# Patient Record
Sex: FEMALE | Race: WHITE | Employment: OTHER | URBAN - METROPOLITAN AREA
[De-identification: names, ages, dates, MRNs, and addresses within clinical notes are randomized per-mention and may not be internally consistent; named-entity substitution may affect disease eponyms.]

---

## 2017-01-01 ENCOUNTER — APPOINTMENT (OUTPATIENT)
Dept: ULTRASOUND IMAGING | Age: 82
DRG: 871 | End: 2017-01-01
Attending: INTERNAL MEDICINE
Payer: MEDICARE

## 2017-01-01 ENCOUNTER — APPOINTMENT (OUTPATIENT)
Dept: GENERAL RADIOLOGY | Age: 82
DRG: 871 | End: 2017-01-01
Attending: EMERGENCY MEDICINE
Payer: MEDICARE

## 2017-01-01 ENCOUNTER — APPOINTMENT (OUTPATIENT)
Dept: CT IMAGING | Age: 82
DRG: 871 | End: 2017-01-01
Attending: INTERNAL MEDICINE
Payer: MEDICARE

## 2017-01-01 ENCOUNTER — HOSPITAL ENCOUNTER (INPATIENT)
Age: 82
LOS: 4 days | Discharge: HOME HEALTH | DRG: 871 | End: 2017-12-27
Attending: EMERGENCY MEDICINE | Admitting: INTERNAL MEDICINE
Payer: MEDICARE

## 2017-01-01 ENCOUNTER — APPOINTMENT (OUTPATIENT)
Dept: GENERAL RADIOLOGY | Age: 82
DRG: 871 | End: 2017-01-01
Attending: INTERNAL MEDICINE
Payer: MEDICARE

## 2017-01-01 ENCOUNTER — PATIENT OUTREACH (OUTPATIENT)
Dept: CASE MANAGEMENT | Age: 82
End: 2017-01-01

## 2017-01-01 ENCOUNTER — HOME CARE VISIT (OUTPATIENT)
Dept: SCHEDULING | Facility: HOME HEALTH | Age: 82
End: 2017-01-01
Payer: MEDICARE

## 2017-01-01 ENCOUNTER — TELEPHONE (OUTPATIENT)
Dept: HOME HEALTH SERVICES | Facility: HOME HEALTH | Age: 82
End: 2017-01-01

## 2017-01-01 ENCOUNTER — HOME HEALTH ADMISSION (OUTPATIENT)
Dept: HOME HEALTH SERVICES | Facility: HOME HEALTH | Age: 82
End: 2017-01-01
Payer: MEDICARE

## 2017-01-01 VITALS
OXYGEN SATURATION: 90 % | BODY MASS INDEX: 30.66 KG/M2 | SYSTOLIC BLOOD PRESSURE: 107 MMHG | TEMPERATURE: 98.2 F | HEIGHT: 65 IN | HEART RATE: 104 BPM | RESPIRATION RATE: 18 BRPM | WEIGHT: 184 LBS | DIASTOLIC BLOOD PRESSURE: 56 MMHG

## 2017-01-01 VITALS
OXYGEN SATURATION: 92 % | DIASTOLIC BLOOD PRESSURE: 60 MMHG | HEART RATE: 80 BPM | SYSTOLIC BLOOD PRESSURE: 120 MMHG | TEMPERATURE: 96.8 F

## 2017-01-01 DIAGNOSIS — R53.1 WEAKNESS: ICD-10-CM

## 2017-01-01 DIAGNOSIS — R41.0 CONFUSION: Primary | ICD-10-CM

## 2017-01-01 DIAGNOSIS — J20.9 ACUTE BRONCHITIS, UNSPECIFIED ORGANISM: ICD-10-CM

## 2017-01-01 DIAGNOSIS — I50.9 ACUTE ON CHRONIC CONGESTIVE HEART FAILURE, UNSPECIFIED CONGESTIVE HEART FAILURE TYPE: ICD-10-CM

## 2017-01-01 LAB
ALBUMIN SERPL-MCNC: 2.5 G/DL (ref 3.2–4.6)
ALBUMIN SERPL-MCNC: 3.1 G/DL (ref 3.2–4.6)
ALBUMIN/GLOB SERPL: 0.7 {RATIO} (ref 1.2–3.5)
ALBUMIN/GLOB SERPL: 0.7 {RATIO} (ref 1.2–3.5)
ALP SERPL-CCNC: 58 U/L (ref 50–136)
ALP SERPL-CCNC: 67 U/L (ref 50–136)
ALT SERPL-CCNC: 112 U/L (ref 12–65)
ALT SERPL-CCNC: 78 U/L (ref 12–65)
ANION GAP SERPL CALC-SCNC: 10 MMOL/L (ref 7–16)
ANION GAP SERPL CALC-SCNC: 12 MMOL/L (ref 7–16)
ANION GAP SERPL CALC-SCNC: 3 MMOL/L (ref 7–16)
ANION GAP SERPL CALC-SCNC: 7 MMOL/L (ref 7–16)
ANION GAP SERPL CALC-SCNC: 9 MMOL/L (ref 7–16)
AST SERPL-CCNC: 27 U/L (ref 15–37)
AST SERPL-CCNC: 43 U/L (ref 15–37)
ATRIAL RATE: 156 BPM
BACTERIA SPEC CULT: NORMAL
BACTERIA SPEC CULT: NORMAL
BACTERIA URNS QL MICRO: NORMAL /HPF
BASOPHILS # BLD: 0 K/UL (ref 0–0.2)
BASOPHILS NFR BLD: 0 % (ref 0–2)
BILIRUB DIRECT SERPL-MCNC: 0.1 MG/DL
BILIRUB SERPL-MCNC: 0.3 MG/DL (ref 0.2–1.1)
BILIRUB SERPL-MCNC: 0.4 MG/DL (ref 0.2–1.1)
BNP SERPL-MCNC: 834 PG/ML
BUN SERPL-MCNC: 48 MG/DL (ref 8–23)
BUN SERPL-MCNC: 50 MG/DL (ref 8–23)
BUN SERPL-MCNC: 51 MG/DL (ref 8–23)
BUN SERPL-MCNC: 51 MG/DL (ref 8–23)
BUN SERPL-MCNC: 61 MG/DL (ref 8–23)
CALCIUM SERPL-MCNC: 8.5 MG/DL (ref 8.3–10.4)
CALCIUM SERPL-MCNC: 8.5 MG/DL (ref 8.3–10.4)
CALCIUM SERPL-MCNC: 8.7 MG/DL (ref 8.3–10.4)
CALCIUM SERPL-MCNC: 8.7 MG/DL (ref 8.3–10.4)
CALCIUM SERPL-MCNC: 9.3 MG/DL (ref 8.3–10.4)
CALCULATED R AXIS, ECG10: 76 DEGREES
CALCULATED T AXIS, ECG11: 126 DEGREES
CASTS URNS QL MICRO: 0 /LPF
CHLORIDE SERPL-SCNC: 102 MMOL/L (ref 98–107)
CHLORIDE SERPL-SCNC: 103 MMOL/L (ref 98–107)
CHLORIDE SERPL-SCNC: 103 MMOL/L (ref 98–107)
CHLORIDE SERPL-SCNC: 105 MMOL/L (ref 98–107)
CHLORIDE SERPL-SCNC: 99 MMOL/L (ref 98–107)
CO2 SERPL-SCNC: 26 MMOL/L (ref 21–32)
CO2 SERPL-SCNC: 27 MMOL/L (ref 21–32)
CO2 SERPL-SCNC: 30 MMOL/L (ref 21–32)
CO2 SERPL-SCNC: 33 MMOL/L (ref 21–32)
CO2 SERPL-SCNC: 34 MMOL/L (ref 21–32)
CREAT SERPL-MCNC: 1.76 MG/DL (ref 0.6–1)
CREAT SERPL-MCNC: 1.77 MG/DL (ref 0.6–1)
CREAT SERPL-MCNC: 1.79 MG/DL (ref 0.6–1)
CREAT SERPL-MCNC: 1.85 MG/DL (ref 0.6–1)
CREAT SERPL-MCNC: 1.88 MG/DL (ref 0.6–1)
CRYSTALS URNS QL MICRO: 0 /LPF
DIAGNOSIS, 93000: NORMAL
DIFFERENTIAL METHOD BLD: ABNORMAL
EOSINOPHIL # BLD: 0 K/UL (ref 0–0.8)
EOSINOPHIL # BLD: 0 K/UL (ref 0–0.8)
EOSINOPHIL # BLD: 0.3 K/UL (ref 0–0.8)
EOSINOPHIL # BLD: 0.4 K/UL (ref 0–0.8)
EOSINOPHIL NFR BLD: 0 % (ref 0.5–7.8)
EOSINOPHIL NFR BLD: 0 % (ref 0.5–7.8)
EOSINOPHIL NFR BLD: 2 % (ref 0.5–7.8)
EOSINOPHIL NFR BLD: 3 % (ref 0.5–7.8)
EPI CELLS #/AREA URNS HPF: NORMAL /HPF
ERYTHROCYTE [DISTWIDTH] IN BLOOD BY AUTOMATED COUNT: 19.3 % (ref 11.9–14.6)
ERYTHROCYTE [DISTWIDTH] IN BLOOD BY AUTOMATED COUNT: 19.4 % (ref 11.9–14.6)
ERYTHROCYTE [DISTWIDTH] IN BLOOD BY AUTOMATED COUNT: 19.4 % (ref 11.9–14.6)
ERYTHROCYTE [DISTWIDTH] IN BLOOD BY AUTOMATED COUNT: 19.5 % (ref 11.9–14.6)
FLUAV AG NPH QL IA: NEGATIVE
FLUBV AG NPH QL IA: NEGATIVE
GLOBULIN SER CALC-MCNC: 3.7 G/DL (ref 2.3–3.5)
GLOBULIN SER CALC-MCNC: 4.3 G/DL (ref 2.3–3.5)
GLUCOSE SERPL-MCNC: 144 MG/DL (ref 65–100)
GLUCOSE SERPL-MCNC: 149 MG/DL (ref 65–100)
GLUCOSE SERPL-MCNC: 157 MG/DL (ref 65–100)
GLUCOSE SERPL-MCNC: 177 MG/DL (ref 65–100)
GLUCOSE SERPL-MCNC: 205 MG/DL (ref 65–100)
HCT VFR BLD AUTO: 29.6 % (ref 35.8–46.3)
HCT VFR BLD AUTO: 30.8 % (ref 35.8–46.3)
HCT VFR BLD AUTO: 31 % (ref 35.8–46.3)
HCT VFR BLD AUTO: 34.8 % (ref 35.8–46.3)
HGB BLD-MCNC: 10.7 G/DL (ref 11.7–15.4)
HGB BLD-MCNC: 9.4 G/DL (ref 11.7–15.4)
HGB BLD-MCNC: 9.7 G/DL (ref 11.7–15.4)
HGB BLD-MCNC: 9.7 G/DL (ref 11.7–15.4)
IMM GRANULOCYTES # BLD: 0.1 K/UL (ref 0–0.5)
IMM GRANULOCYTES # BLD: 0.1 K/UL (ref 0–0.5)
IMM GRANULOCYTES # BLD: 0.2 K/UL (ref 0–0.5)
IMM GRANULOCYTES NFR BLD AUTO: 1 % (ref 0–5)
INR PPP: 1
LACTATE BLD-SCNC: 2.6 MMOL/L (ref 0.5–1.9)
LACTATE BLD-SCNC: 2.9 MMOL/L (ref 0.5–1.9)
LACTATE SERPL-SCNC: 2 MMOL/L (ref 0.4–2)
LACTATE SERPL-SCNC: 2.9 MMOL/L (ref 0.4–2)
LYMPHOCYTES # BLD: 0.6 K/UL (ref 0.5–4.6)
LYMPHOCYTES # BLD: 1.2 K/UL (ref 0.5–4.6)
LYMPHOCYTES # BLD: 1.8 K/UL (ref 0.5–4.6)
LYMPHOCYTES # BLD: 1.9 K/UL (ref 0.5–4.6)
LYMPHOCYTES NFR BLD: 11 % (ref 13–44)
LYMPHOCYTES NFR BLD: 12 % (ref 13–44)
LYMPHOCYTES NFR BLD: 3 % (ref 13–44)
LYMPHOCYTES NFR BLD: 7 % (ref 13–44)
MAGNESIUM SERPL-MCNC: 1.5 MG/DL (ref 1.8–2.4)
MAGNESIUM SERPL-MCNC: 1.6 MG/DL (ref 1.8–2.4)
MAGNESIUM SERPL-MCNC: 1.8 MG/DL (ref 1.8–2.4)
MAGNESIUM SERPL-MCNC: 2.2 MG/DL (ref 1.8–2.4)
MCH RBC QN AUTO: 26.2 PG (ref 26.1–32.9)
MCH RBC QN AUTO: 26.3 PG (ref 26.1–32.9)
MCH RBC QN AUTO: 26.5 PG (ref 26.1–32.9)
MCH RBC QN AUTO: 26.7 PG (ref 26.1–32.9)
MCHC RBC AUTO-ENTMCNC: 30.7 G/DL (ref 31.4–35)
MCHC RBC AUTO-ENTMCNC: 31.3 G/DL (ref 31.4–35)
MCHC RBC AUTO-ENTMCNC: 31.5 G/DL (ref 31.4–35)
MCHC RBC AUTO-ENTMCNC: 31.8 G/DL (ref 31.4–35)
MCV RBC AUTO: 84 FL (ref 79.6–97.8)
MCV RBC AUTO: 84.1 FL (ref 79.6–97.8)
MCV RBC AUTO: 84.2 FL (ref 79.6–97.8)
MCV RBC AUTO: 85.1 FL (ref 79.6–97.8)
MM INDURATION POC: 0 MM (ref 0–5)
MM INDURATION POC: 0 MM (ref 0–5)
MONOCYTES # BLD: 1 K/UL (ref 0.1–1.3)
MONOCYTES # BLD: 1.1 K/UL (ref 0.1–1.3)
MONOCYTES # BLD: 1.2 K/UL (ref 0.1–1.3)
MONOCYTES # BLD: 1.2 K/UL (ref 0.1–1.3)
MONOCYTES NFR BLD: 5 % (ref 4–12)
MONOCYTES NFR BLD: 7 % (ref 4–12)
MONOCYTES NFR BLD: 7 % (ref 4–12)
MONOCYTES NFR BLD: 8 % (ref 4–12)
MUCOUS THREADS URNS QL MICRO: 0 /LPF
NEUTS SEG # BLD: 12 K/UL (ref 1.7–8.2)
NEUTS SEG # BLD: 12.4 K/UL (ref 1.7–8.2)
NEUTS SEG # BLD: 14.4 K/UL (ref 1.7–8.2)
NEUTS SEG # BLD: 17.4 K/UL (ref 1.7–8.2)
NEUTS SEG NFR BLD: 77 % (ref 43–78)
NEUTS SEG NFR BLD: 78 % (ref 43–78)
NEUTS SEG NFR BLD: 86 % (ref 43–78)
NEUTS SEG NFR BLD: 91 % (ref 43–78)
PLATELET # BLD AUTO: 265 K/UL (ref 150–450)
PLATELET # BLD AUTO: 291 K/UL (ref 150–450)
PLATELET # BLD AUTO: 301 K/UL (ref 150–450)
PLATELET # BLD AUTO: 319 K/UL (ref 150–450)
PLATELET COMMENTS,PCOM: ADEQUATE
PMV BLD AUTO: 11.3 FL (ref 10.8–14.1)
PMV BLD AUTO: 11.4 FL (ref 10.8–14.1)
PMV BLD AUTO: 11.7 FL (ref 10.8–14.1)
PMV BLD AUTO: 11.7 FL (ref 10.8–14.1)
POTASSIUM SERPL-SCNC: 3.8 MMOL/L (ref 3.5–5.1)
POTASSIUM SERPL-SCNC: 3.8 MMOL/L (ref 3.5–5.1)
POTASSIUM SERPL-SCNC: 3.9 MMOL/L (ref 3.5–5.1)
POTASSIUM SERPL-SCNC: 4.5 MMOL/L (ref 3.5–5.1)
POTASSIUM SERPL-SCNC: 4.5 MMOL/L (ref 3.5–5.1)
PPD POC: NORMAL NEGATIVE
PPD POC: NORMAL NEGATIVE
PROCALCITONIN SERPL-MCNC: 0.1 NG/ML
PROT SERPL-MCNC: 6.2 G/DL (ref 6.3–8.2)
PROT SERPL-MCNC: 7.4 G/DL (ref 6.3–8.2)
PROTHROMBIN TIME: 13.6 SEC (ref 11.5–14.5)
Q-T INTERVAL, ECG07: 320 MS
QRS DURATION, ECG06: 74 MS
QTC CALCULATION (BEZET), ECG08: 442 MS
RBC # BLD AUTO: 3.52 M/UL (ref 4.05–5.25)
RBC # BLD AUTO: 3.66 M/UL (ref 4.05–5.25)
RBC # BLD AUTO: 3.69 M/UL (ref 4.05–5.25)
RBC # BLD AUTO: 4.09 M/UL (ref 4.05–5.25)
RBC #/AREA URNS HPF: NORMAL /HPF
RBC MORPH BLD: ABNORMAL
SERVICE CMNT-IMP: NORMAL
SERVICE CMNT-IMP: NORMAL
SODIUM SERPL-SCNC: 139 MMOL/L (ref 136–145)
SODIUM SERPL-SCNC: 140 MMOL/L (ref 136–145)
SODIUM SERPL-SCNC: 140 MMOL/L (ref 136–145)
SODIUM SERPL-SCNC: 141 MMOL/L (ref 136–145)
SODIUM SERPL-SCNC: 143 MMOL/L (ref 136–145)
TROPONIN I SERPL-MCNC: 0.09 NG/ML (ref 0.02–0.05)
TROPONIN I SERPL-MCNC: 0.09 NG/ML (ref 0.02–0.05)
TROPONIN I SERPL-MCNC: 0.1 NG/ML (ref 0.02–0.05)
VENTRICULAR RATE, ECG03: 115 BPM
WBC # BLD AUTO: 15.4 K/UL (ref 4.3–11.1)
WBC # BLD AUTO: 15.8 K/UL (ref 4.3–11.1)
WBC # BLD AUTO: 16.8 K/UL (ref 4.3–11.1)
WBC # BLD AUTO: 19.3 K/UL (ref 4.3–11.1)
WBC MORPH BLD: ABNORMAL
WBC URNS QL MICRO: NORMAL /HPF

## 2017-01-01 PROCEDURE — G0151 HHCP-SERV OF PT,EA 15 MIN: HCPCS

## 2017-01-01 PROCEDURE — 94760 N-INVAS EAR/PLS OXIMETRY 1: CPT

## 2017-01-01 PROCEDURE — 97530 THERAPEUTIC ACTIVITIES: CPT

## 2017-01-01 PROCEDURE — C8929 TTE W OR WO FOL WCON,DOPPLER: HCPCS

## 2017-01-01 PROCEDURE — 76700 US EXAM ABDOM COMPLETE: CPT

## 2017-01-01 PROCEDURE — 74011250636 HC RX REV CODE- 250/636: Performed by: INTERNAL MEDICINE

## 2017-01-01 PROCEDURE — 94664 DEMO&/EVAL PT USE INHALER: CPT

## 2017-01-01 PROCEDURE — 74011250637 HC RX REV CODE- 250/637: Performed by: INTERNAL MEDICINE

## 2017-01-01 PROCEDURE — 86580 TB INTRADERMAL TEST: CPT | Performed by: INTERNAL MEDICINE

## 2017-01-01 PROCEDURE — 400013 HH SOC

## 2017-01-01 PROCEDURE — 97165 OT EVAL LOW COMPLEX 30 MIN: CPT

## 2017-01-01 PROCEDURE — 85025 COMPLETE CBC W/AUTO DIFF WBC: CPT | Performed by: EMERGENCY MEDICINE

## 2017-01-01 PROCEDURE — 83735 ASSAY OF MAGNESIUM: CPT | Performed by: INTERNAL MEDICINE

## 2017-01-01 PROCEDURE — 77030011256 HC DRSG MEPILEX <16IN NO BORD MOLN -A

## 2017-01-01 PROCEDURE — 97535 SELF CARE MNGMENT TRAINING: CPT

## 2017-01-01 PROCEDURE — 84145 PROCALCITONIN (PCT): CPT | Performed by: INTERNAL MEDICINE

## 2017-01-01 PROCEDURE — 74011000302 HC RX REV CODE- 302: Performed by: INTERNAL MEDICINE

## 2017-01-01 PROCEDURE — 81015 MICROSCOPIC EXAM OF URINE: CPT | Performed by: EMERGENCY MEDICINE

## 2017-01-01 PROCEDURE — 77030019605

## 2017-01-01 PROCEDURE — 93005 ELECTROCARDIOGRAM TRACING: CPT | Performed by: EMERGENCY MEDICINE

## 2017-01-01 PROCEDURE — 83605 ASSAY OF LACTIC ACID: CPT

## 2017-01-01 PROCEDURE — 85025 COMPLETE CBC W/AUTO DIFF WBC: CPT | Performed by: INTERNAL MEDICINE

## 2017-01-01 PROCEDURE — 36415 COLL VENOUS BLD VENIPUNCTURE: CPT | Performed by: INTERNAL MEDICINE

## 2017-01-01 PROCEDURE — 83605 ASSAY OF LACTIC ACID: CPT | Performed by: INTERNAL MEDICINE

## 2017-01-01 PROCEDURE — 74011000250 HC RX REV CODE- 250: Performed by: INTERNAL MEDICINE

## 2017-01-01 PROCEDURE — 94640 AIRWAY INHALATION TREATMENT: CPT

## 2017-01-01 PROCEDURE — 77030027688 HC DRSG MEPILEX 16-48IN NO BORD MOLN -A

## 2017-01-01 PROCEDURE — 77010033678 HC OXYGEN DAILY

## 2017-01-01 PROCEDURE — 80048 BASIC METABOLIC PNL TOTAL CA: CPT | Performed by: INTERNAL MEDICINE

## 2017-01-01 PROCEDURE — 71010 XR CHEST PORT: CPT

## 2017-01-01 PROCEDURE — 80053 COMPREHEN METABOLIC PANEL: CPT | Performed by: EMERGENCY MEDICINE

## 2017-01-01 PROCEDURE — 74011250636 HC RX REV CODE- 250/636: Performed by: EMERGENCY MEDICINE

## 2017-01-01 PROCEDURE — 81003 URINALYSIS AUTO W/O SCOPE: CPT | Performed by: EMERGENCY MEDICINE

## 2017-01-01 PROCEDURE — 87804 INFLUENZA ASSAY W/OPTIC: CPT | Performed by: INTERNAL MEDICINE

## 2017-01-01 PROCEDURE — 65660000000 HC RM CCU STEPDOWN

## 2017-01-01 PROCEDURE — 85610 PROTHROMBIN TIME: CPT | Performed by: EMERGENCY MEDICINE

## 2017-01-01 PROCEDURE — 77030027138 HC INCENT SPIROMETER -A

## 2017-01-01 PROCEDURE — 71010 XR CHEST SNGL V: CPT

## 2017-01-01 PROCEDURE — 83880 ASSAY OF NATRIURETIC PEPTIDE: CPT | Performed by: EMERGENCY MEDICINE

## 2017-01-01 PROCEDURE — 99285 EMERGENCY DEPT VISIT HI MDM: CPT | Performed by: EMERGENCY MEDICINE

## 2017-01-01 PROCEDURE — 80076 HEPATIC FUNCTION PANEL: CPT | Performed by: INTERNAL MEDICINE

## 2017-01-01 PROCEDURE — 65270000029 HC RM PRIVATE

## 2017-01-01 PROCEDURE — 84484 ASSAY OF TROPONIN QUANT: CPT | Performed by: INTERNAL MEDICINE

## 2017-01-01 PROCEDURE — 84484 ASSAY OF TROPONIN QUANT: CPT | Performed by: EMERGENCY MEDICINE

## 2017-01-01 PROCEDURE — 97161 PT EVAL LOW COMPLEX 20 MIN: CPT

## 2017-01-01 PROCEDURE — 87040 BLOOD CULTURE FOR BACTERIA: CPT | Performed by: EMERGENCY MEDICINE

## 2017-01-01 PROCEDURE — 96360 HYDRATION IV INFUSION INIT: CPT | Performed by: EMERGENCY MEDICINE

## 2017-01-01 PROCEDURE — 71250 CT THORAX DX C-: CPT

## 2017-01-01 RX ORDER — DILTIAZEM HYDROCHLORIDE 120 MG/1
120 CAPSULE, COATED, EXTENDED RELEASE ORAL DAILY
Status: DISCONTINUED | OUTPATIENT
Start: 2017-01-01 | End: 2017-01-01 | Stop reason: HOSPADM

## 2017-01-01 RX ORDER — FUROSEMIDE 40 MG/1
40 TABLET ORAL DAILY
Qty: 30 TAB | Refills: 0 | Status: SHIPPED | OUTPATIENT
Start: 2017-01-01 | End: 2018-01-01 | Stop reason: SDUPTHER

## 2017-01-01 RX ORDER — FUROSEMIDE 10 MG/ML
40 INJECTION INTRAMUSCULAR; INTRAVENOUS
Status: COMPLETED | OUTPATIENT
Start: 2017-01-01 | End: 2017-01-01

## 2017-01-01 RX ORDER — LANOLIN ALCOHOL/MO/W.PET/CERES
400 CREAM (GRAM) TOPICAL 2 TIMES DAILY
Qty: 60 TAB | Refills: 0 | Status: SHIPPED | OUTPATIENT
Start: 2017-01-01 | End: 2018-01-01 | Stop reason: SDUPTHER

## 2017-01-01 RX ORDER — DILTIAZEM HYDROCHLORIDE 120 MG/1
120 CAPSULE, COATED, EXTENDED RELEASE ORAL DAILY
Qty: 30 CAP | Refills: 0 | Status: SHIPPED | OUTPATIENT
Start: 2017-01-01 | End: 2018-01-01 | Stop reason: SDUPTHER

## 2017-01-01 RX ORDER — METOPROLOL TARTRATE 25 MG/1
25 TABLET, FILM COATED ORAL EVERY 12 HOURS
Status: DISCONTINUED | OUTPATIENT
Start: 2017-01-01 | End: 2017-01-01

## 2017-01-01 RX ORDER — METOPROLOL TARTRATE 5 MG/5ML
2.5 INJECTION INTRAVENOUS
Status: DISCONTINUED | OUTPATIENT
Start: 2017-01-01 | End: 2017-01-01

## 2017-01-01 RX ORDER — METOPROLOL TARTRATE 50 MG/1
50 TABLET ORAL EVERY 12 HOURS
Status: DISCONTINUED | OUTPATIENT
Start: 2017-01-01 | End: 2017-01-01

## 2017-01-01 RX ORDER — HEPARIN SODIUM 5000 [USP'U]/ML
5000 INJECTION, SOLUTION INTRAVENOUS; SUBCUTANEOUS EVERY 12 HOURS
Status: DISCONTINUED | OUTPATIENT
Start: 2017-01-01 | End: 2017-01-01 | Stop reason: HOSPADM

## 2017-01-01 RX ORDER — LEVOFLOXACIN 5 MG/ML
750 INJECTION, SOLUTION INTRAVENOUS
Status: COMPLETED | OUTPATIENT
Start: 2017-01-01 | End: 2017-01-01

## 2017-01-01 RX ORDER — AZITHROMYCIN 500 MG/1
500 TABLET, FILM COATED ORAL DAILY
Qty: 3 TAB | Refills: 0 | Status: SHIPPED | OUTPATIENT
Start: 2017-01-01 | End: 2018-01-01

## 2017-01-01 RX ORDER — MAGNESIUM SULFATE HEPTAHYDRATE 40 MG/ML
2 INJECTION, SOLUTION INTRAVENOUS ONCE
Status: COMPLETED | OUTPATIENT
Start: 2017-01-01 | End: 2017-01-01

## 2017-01-01 RX ORDER — ASPIRIN 81 MG/1
81 TABLET ORAL DAILY
Status: DISCONTINUED | OUTPATIENT
Start: 2017-01-01 | End: 2017-01-01 | Stop reason: HOSPADM

## 2017-01-01 RX ORDER — IPRATROPIUM BROMIDE AND ALBUTEROL SULFATE 2.5; .5 MG/3ML; MG/3ML
3 SOLUTION RESPIRATORY (INHALATION)
Status: DISCONTINUED | OUTPATIENT
Start: 2017-01-01 | End: 2017-01-01

## 2017-01-01 RX ORDER — DILTIAZEM HYDROCHLORIDE 5 MG/ML
20 INJECTION INTRAVENOUS ONCE
Status: COMPLETED | OUTPATIENT
Start: 2017-01-01 | End: 2017-01-01

## 2017-01-01 RX ORDER — HYDRALAZINE HYDROCHLORIDE 20 MG/ML
20 INJECTION INTRAMUSCULAR; INTRAVENOUS
Status: DISCONTINUED | OUTPATIENT
Start: 2017-01-01 | End: 2017-01-01 | Stop reason: HOSPADM

## 2017-01-01 RX ORDER — METOPROLOL TARTRATE 50 MG/1
50 TABLET ORAL EVERY 12 HOURS
Qty: 60 TAB | Refills: 0 | Status: SHIPPED | OUTPATIENT
Start: 2017-01-01 | End: 2018-01-01 | Stop reason: SDUPTHER

## 2017-01-01 RX ORDER — METOPROLOL TARTRATE 50 MG/1
100 TABLET ORAL EVERY 12 HOURS
Status: DISCONTINUED | OUTPATIENT
Start: 2017-01-01 | End: 2017-01-01

## 2017-01-01 RX ORDER — METOPROLOL TARTRATE 50 MG/1
50 TABLET ORAL EVERY 12 HOURS
Status: DISCONTINUED | OUTPATIENT
Start: 2017-01-01 | End: 2017-01-01 | Stop reason: HOSPADM

## 2017-01-01 RX ORDER — LEVOTHYROXINE SODIUM 75 UG/1
75 TABLET ORAL
Status: DISCONTINUED | OUTPATIENT
Start: 2017-01-01 | End: 2017-01-01 | Stop reason: HOSPADM

## 2017-01-01 RX ORDER — ACETAMINOPHEN 325 MG/1
650 TABLET ORAL
Qty: 20 TAB | Refills: 0 | Status: SHIPPED | OUTPATIENT
Start: 2017-01-01 | End: 2018-01-01 | Stop reason: SDUPTHER

## 2017-01-01 RX ORDER — ACETAMINOPHEN 325 MG/1
650 TABLET ORAL
Status: DISCONTINUED | OUTPATIENT
Start: 2017-01-01 | End: 2017-01-01 | Stop reason: HOSPADM

## 2017-01-01 RX ORDER — ASPIRIN 81 MG/1
81 TABLET ORAL DAILY
Qty: 30 TAB | Refills: 0 | Status: SHIPPED | OUTPATIENT
Start: 2017-01-01 | End: 2018-01-01 | Stop reason: SDUPTHER

## 2017-01-01 RX ORDER — IPRATROPIUM BROMIDE AND ALBUTEROL SULFATE 2.5; .5 MG/3ML; MG/3ML
3 SOLUTION RESPIRATORY (INHALATION)
Status: DISCONTINUED | OUTPATIENT
Start: 2017-01-01 | End: 2017-01-01 | Stop reason: HOSPADM

## 2017-01-01 RX ORDER — FUROSEMIDE 10 MG/ML
40 INJECTION INTRAMUSCULAR; INTRAVENOUS EVERY 12 HOURS
Status: DISCONTINUED | OUTPATIENT
Start: 2017-01-01 | End: 2017-01-01

## 2017-01-01 RX ORDER — FUROSEMIDE 40 MG/1
40 TABLET ORAL DAILY
Status: DISCONTINUED | OUTPATIENT
Start: 2017-01-01 | End: 2017-01-01 | Stop reason: HOSPADM

## 2017-01-01 RX ORDER — LANOLIN ALCOHOL/MO/W.PET/CERES
400 CREAM (GRAM) TOPICAL 2 TIMES DAILY
Status: DISCONTINUED | OUTPATIENT
Start: 2017-01-01 | End: 2017-01-01 | Stop reason: HOSPADM

## 2017-01-01 RX ORDER — ONDANSETRON 2 MG/ML
4 INJECTION INTRAMUSCULAR; INTRAVENOUS
Status: DISCONTINUED | OUTPATIENT
Start: 2017-01-01 | End: 2017-01-01 | Stop reason: HOSPADM

## 2017-01-01 RX ADMIN — IPRATROPIUM BROMIDE AND ALBUTEROL SULFATE 3 ML: 2.5; .5 SOLUTION RESPIRATORY (INHALATION) at 13:35

## 2017-01-01 RX ADMIN — IPRATROPIUM BROMIDE AND ALBUTEROL SULFATE 3 ML: 2.5; .5 SOLUTION RESPIRATORY (INHALATION) at 14:41

## 2017-01-01 RX ADMIN — HEPARIN SODIUM 5000 UNITS: 5000 INJECTION, SOLUTION INTRAVENOUS; SUBCUTANEOUS at 08:33

## 2017-01-01 RX ADMIN — IPRATROPIUM BROMIDE AND ALBUTEROL SULFATE 3 ML: 2.5; .5 SOLUTION RESPIRATORY (INHALATION) at 07:44

## 2017-01-01 RX ADMIN — MAGNESIUM SULFATE HEPTAHYDRATE 2 G: 40 INJECTION, SOLUTION INTRAVENOUS at 08:11

## 2017-01-01 RX ADMIN — FUROSEMIDE 40 MG: 10 INJECTION, SOLUTION INTRAMUSCULAR; INTRAVENOUS at 21:15

## 2017-01-01 RX ADMIN — HEPARIN SODIUM 5000 UNITS: 5000 INJECTION, SOLUTION INTRAVENOUS; SUBCUTANEOUS at 09:18

## 2017-01-01 RX ADMIN — WATER 1 G: 1 INJECTION INTRAMUSCULAR; INTRAVENOUS; SUBCUTANEOUS at 22:19

## 2017-01-01 RX ADMIN — METOPROLOL TARTRATE 50 MG: 50 TABLET, FILM COATED ORAL at 22:21

## 2017-01-01 RX ADMIN — HEPARIN SODIUM 5000 UNITS: 5000 INJECTION, SOLUTION INTRAVENOUS; SUBCUTANEOUS at 12:45

## 2017-01-01 RX ADMIN — IPRATROPIUM BROMIDE AND ALBUTEROL SULFATE 3 ML: 2.5; .5 SOLUTION RESPIRATORY (INHALATION) at 20:06

## 2017-01-01 RX ADMIN — IPRATROPIUM BROMIDE AND ALBUTEROL SULFATE 3 ML: 2.5; .5 SOLUTION RESPIRATORY (INHALATION) at 02:00

## 2017-01-01 RX ADMIN — AZITHROMYCIN MONOHYDRATE 500 MG: 500 INJECTION, POWDER, LYOPHILIZED, FOR SOLUTION INTRAVENOUS at 23:45

## 2017-01-01 RX ADMIN — FUROSEMIDE 40 MG: 10 INJECTION, SOLUTION INTRAMUSCULAR; INTRAVENOUS at 23:47

## 2017-01-01 RX ADMIN — DILTIAZEM HYDROCHLORIDE 120 MG: 120 CAPSULE, EXTENDED RELEASE ORAL at 08:33

## 2017-01-01 RX ADMIN — HEPARIN SODIUM 5000 UNITS: 5000 INJECTION, SOLUTION INTRAVENOUS; SUBCUTANEOUS at 22:06

## 2017-01-01 RX ADMIN — IPRATROPIUM BROMIDE AND ALBUTEROL SULFATE 3 ML: 2.5; .5 SOLUTION RESPIRATORY (INHALATION) at 20:16

## 2017-01-01 RX ADMIN — METOPROLOL TARTRATE 50 MG: 50 TABLET ORAL at 09:18

## 2017-01-01 RX ADMIN — FUROSEMIDE 40 MG: 40 TABLET ORAL at 12:45

## 2017-01-01 RX ADMIN — FUROSEMIDE 40 MG: 10 INJECTION, SOLUTION INTRAMUSCULAR; INTRAVENOUS at 20:49

## 2017-01-01 RX ADMIN — FUROSEMIDE 40 MG: 10 INJECTION, SOLUTION INTRAMUSCULAR; INTRAVENOUS at 08:33

## 2017-01-01 RX ADMIN — LEVOTHYROXINE SODIUM 75 MCG: 50 TABLET ORAL at 05:38

## 2017-01-01 RX ADMIN — Medication 400 MG: at 08:12

## 2017-01-01 RX ADMIN — WATER 1 G: 1 INJECTION INTRAMUSCULAR; INTRAVENOUS; SUBCUTANEOUS at 21:15

## 2017-01-01 RX ADMIN — METOPROLOL TARTRATE 25 MG: 25 TABLET ORAL at 20:49

## 2017-01-01 RX ADMIN — IPRATROPIUM BROMIDE AND ALBUTEROL SULFATE 3 ML: 2.5; .5 SOLUTION RESPIRATORY (INHALATION) at 08:10

## 2017-01-01 RX ADMIN — IPRATROPIUM BROMIDE AND ALBUTEROL SULFATE 3 ML: 2.5; .5 SOLUTION RESPIRATORY (INHALATION) at 20:14

## 2017-01-01 RX ADMIN — AZITHROMYCIN MONOHYDRATE 500 MG: 500 INJECTION, POWDER, LYOPHILIZED, FOR SOLUTION INTRAVENOUS at 01:16

## 2017-01-01 RX ADMIN — WATER 1 G: 1 INJECTION INTRAMUSCULAR; INTRAVENOUS; SUBCUTANEOUS at 22:07

## 2017-01-01 RX ADMIN — HEPARIN SODIUM 5000 UNITS: 5000 INJECTION, SOLUTION INTRAVENOUS; SUBCUTANEOUS at 20:49

## 2017-01-01 RX ADMIN — PERFLUTREN 1 ML: 6.52 INJECTION, SUSPENSION INTRAVENOUS at 08:00

## 2017-01-01 RX ADMIN — METOPROLOL TARTRATE 50 MG: 50 TABLET, FILM COATED ORAL at 08:12

## 2017-01-01 RX ADMIN — IPRATROPIUM BROMIDE AND ALBUTEROL SULFATE 3 ML: 2.5; .5 SOLUTION RESPIRATORY (INHALATION) at 09:09

## 2017-01-01 RX ADMIN — HEPARIN SODIUM 5000 UNITS: 5000 INJECTION, SOLUTION INTRAVENOUS; SUBCUTANEOUS at 22:22

## 2017-01-01 RX ADMIN — FUROSEMIDE 40 MG: 10 INJECTION, SOLUTION INTRAMUSCULAR; INTRAVENOUS at 09:18

## 2017-01-01 RX ADMIN — MAGNESIUM SULFATE HEPTAHYDRATE 2 G: 40 INJECTION, SOLUTION INTRAVENOUS at 08:33

## 2017-01-01 RX ADMIN — ASPIRIN 81 MG: 81 TABLET, COATED ORAL at 08:33

## 2017-01-01 RX ADMIN — IPRATROPIUM BROMIDE AND ALBUTEROL SULFATE 3 ML: 2.5; .5 SOLUTION RESPIRATORY (INHALATION) at 19:44

## 2017-01-01 RX ADMIN — HEPARIN SODIUM 5000 UNITS: 5000 INJECTION, SOLUTION INTRAVENOUS; SUBCUTANEOUS at 10:56

## 2017-01-01 RX ADMIN — ASPIRIN 81 MG: 81 TABLET, COATED ORAL at 09:00

## 2017-01-01 RX ADMIN — SODIUM CHLORIDE 1000 ML: 900 INJECTION, SOLUTION INTRAVENOUS at 14:56

## 2017-01-01 RX ADMIN — FUROSEMIDE 40 MG: 10 INJECTION, SOLUTION INTRAMUSCULAR; INTRAVENOUS at 10:56

## 2017-01-01 RX ADMIN — METOPROLOL TARTRATE 50 MG: 50 TABLET, FILM COATED ORAL at 12:40

## 2017-01-01 RX ADMIN — IPRATROPIUM BROMIDE AND ALBUTEROL SULFATE 3 ML: 2.5; .5 SOLUTION RESPIRATORY (INHALATION) at 07:27

## 2017-01-01 RX ADMIN — ASPIRIN 81 MG: 81 TABLET, COATED ORAL at 08:11

## 2017-01-01 RX ADMIN — LEVOTHYROXINE SODIUM 75 MCG: 50 TABLET ORAL at 05:26

## 2017-01-01 RX ADMIN — LEVOTHYROXINE SODIUM 75 MCG: 50 TABLET ORAL at 05:56

## 2017-01-01 RX ADMIN — DILTIAZEM HYDROCHLORIDE 120 MG: 120 CAPSULE, EXTENDED RELEASE ORAL at 12:45

## 2017-01-01 RX ADMIN — DILTIAZEM HYDROCHLORIDE 20 MG: 5 INJECTION INTRAVENOUS at 16:26

## 2017-01-01 RX ADMIN — DILTIAZEM HYDROCHLORIDE 120 MG: 120 CAPSULE, EXTENDED RELEASE ORAL at 08:11

## 2017-01-01 RX ADMIN — METOPROLOL TARTRATE 50 MG: 50 TABLET, FILM COATED ORAL at 22:02

## 2017-01-01 RX ADMIN — AZITHROMYCIN MONOHYDRATE 500 MG: 500 INJECTION, POWDER, LYOPHILIZED, FOR SOLUTION INTRAVENOUS at 01:22

## 2017-01-01 RX ADMIN — LEVOTHYROXINE SODIUM 75 MCG: 50 TABLET ORAL at 05:39

## 2017-01-01 RX ADMIN — WATER 1 G: 1 INJECTION INTRAMUSCULAR; INTRAVENOUS; SUBCUTANEOUS at 20:49

## 2017-01-01 RX ADMIN — METOPROLOL TARTRATE 100 MG: 50 TABLET ORAL at 21:15

## 2017-01-01 RX ADMIN — Medication 400 MG: at 12:40

## 2017-01-01 RX ADMIN — ASPIRIN 81 MG: 81 TABLET, COATED ORAL at 09:18

## 2017-01-01 RX ADMIN — FUROSEMIDE 40 MG: 10 INJECTION, SOLUTION INTRAMUSCULAR; INTRAVENOUS at 16:30

## 2017-01-01 RX ADMIN — TUBERCULIN PURIFIED PROTEIN DERIVATIVE 5 UNITS: 5 INJECTION, SOLUTION INTRADERMAL at 21:14

## 2017-01-01 RX ADMIN — Medication 400 MG: at 17:07

## 2017-01-01 RX ADMIN — HEPARIN SODIUM 5000 UNITS: 5000 INJECTION, SOLUTION INTRAVENOUS; SUBCUTANEOUS at 21:13

## 2017-01-01 RX ADMIN — METOPROLOL TARTRATE 100 MG: 50 TABLET ORAL at 08:33

## 2017-01-01 RX ADMIN — LEVOFLOXACIN 750 MG: 5 INJECTION, SOLUTION INTRAVENOUS at 16:34

## 2017-01-03 ENCOUNTER — HOME CARE VISIT (OUTPATIENT)
Dept: SCHEDULING | Facility: HOME HEALTH | Age: 82
End: 2017-01-03
Payer: MEDICARE

## 2017-01-03 VITALS
DIASTOLIC BLOOD PRESSURE: 62 MMHG | HEART RATE: 67 BPM | RESPIRATION RATE: 18 BRPM | OXYGEN SATURATION: 97 % | SYSTOLIC BLOOD PRESSURE: 132 MMHG | TEMPERATURE: 97.5 F

## 2017-01-03 PROCEDURE — G0299 HHS/HOSPICE OF RN EA 15 MIN: HCPCS

## 2017-01-12 ENCOUNTER — HOME CARE VISIT (OUTPATIENT)
Dept: SCHEDULING | Facility: HOME HEALTH | Age: 82
End: 2017-01-12
Payer: MEDICARE

## 2017-01-12 VITALS
SYSTOLIC BLOOD PRESSURE: 132 MMHG | HEART RATE: 67 BPM | RESPIRATION RATE: 18 BRPM | OXYGEN SATURATION: 96 % | TEMPERATURE: 97.1 F | DIASTOLIC BLOOD PRESSURE: 64 MMHG

## 2017-01-12 PROCEDURE — G0299 HHS/HOSPICE OF RN EA 15 MIN: HCPCS

## 2017-01-18 ENCOUNTER — HOME CARE VISIT (OUTPATIENT)
Dept: HOME HEALTH SERVICES | Facility: HOME HEALTH | Age: 82
End: 2017-01-18
Payer: MEDICARE

## 2017-01-18 PROCEDURE — G0299 HHS/HOSPICE OF RN EA 15 MIN: HCPCS

## 2017-01-19 VITALS
DIASTOLIC BLOOD PRESSURE: 64 MMHG | RESPIRATION RATE: 18 BRPM | SYSTOLIC BLOOD PRESSURE: 140 MMHG | TEMPERATURE: 97.3 F | HEART RATE: 59 BPM

## 2017-01-20 ENCOUNTER — HOME CARE VISIT (OUTPATIENT)
Dept: HOME HEALTH SERVICES | Facility: HOME HEALTH | Age: 82
End: 2017-01-20
Payer: MEDICARE

## 2017-02-16 PROBLEM — J45.30 MILD PERSISTENT ASTHMA WITHOUT COMPLICATION: Status: ACTIVE | Noted: 2017-02-16

## 2017-05-11 ENCOUNTER — HOSPITAL ENCOUNTER (OUTPATIENT)
Dept: GENERAL RADIOLOGY | Age: 82
Discharge: HOME | End: 2017-05-11
Attending: FAMILY MEDICINE
Payer: MEDICARE

## 2017-05-11 DIAGNOSIS — R29.898 WEAKNESS OF LEFT LEG: ICD-10-CM

## 2017-05-11 PROCEDURE — 73560 X-RAY EXAM OF KNEE 1 OR 2: CPT

## 2017-05-20 ENCOUNTER — HOSPITAL ENCOUNTER (OUTPATIENT)
Dept: CT IMAGING | Age: 82
Discharge: HOME | End: 2017-05-20
Attending: FAMILY MEDICINE
Payer: MEDICARE

## 2017-05-20 DIAGNOSIS — R29.898 WEAKNESS OF LEFT LEG: ICD-10-CM

## 2017-05-20 PROCEDURE — 70450 CT HEAD/BRAIN W/O DYE: CPT

## 2017-08-03 ENCOUNTER — HOSPITAL ENCOUNTER (OUTPATIENT)
Dept: GENERAL RADIOLOGY | Age: 82
Discharge: HOME | End: 2017-08-03
Attending: FAMILY MEDICINE
Payer: MEDICARE

## 2017-08-03 DIAGNOSIS — J45.30 MILD PERSISTENT ASTHMA WITHOUT COMPLICATION: ICD-10-CM

## 2017-08-03 DIAGNOSIS — R05.9 COUGH: ICD-10-CM

## 2017-08-03 PROCEDURE — 71020 XR CHEST PA LAT: CPT

## 2017-08-03 NOTE — PROGRESS NOTES
She has fibrotic change that is a chronic and incurable process. She is going to cough from this and she is going to have crackles in her lungs from this. Her cough will not resolve and will likely eventually worsen. But we can hope she stays about the same for some time .

## 2017-09-05 ENCOUNTER — HOSPITAL ENCOUNTER (EMERGENCY)
Age: 82
Discharge: HOME | End: 2017-09-05
Payer: MEDICARE

## 2017-09-05 ENCOUNTER — APPOINTMENT (OUTPATIENT)
Dept: GENERAL RADIOLOGY | Age: 82
End: 2017-09-05
Payer: MEDICARE

## 2017-09-05 ENCOUNTER — APPOINTMENT (OUTPATIENT)
Dept: CT IMAGING | Age: 82
End: 2017-09-05
Payer: MEDICARE

## 2017-09-05 VITALS
OXYGEN SATURATION: 96 % | DIASTOLIC BLOOD PRESSURE: 71 MMHG | SYSTOLIC BLOOD PRESSURE: 156 MMHG | BODY MASS INDEX: 31.58 KG/M2 | RESPIRATION RATE: 20 BRPM | HEIGHT: 64 IN | TEMPERATURE: 97.6 F | WEIGHT: 185 LBS | HEART RATE: 94 BPM

## 2017-09-05 DIAGNOSIS — R06.02 SOB (SHORTNESS OF BREATH): Primary | ICD-10-CM

## 2017-09-05 DIAGNOSIS — J84.10 PULMONARY FIBROSIS (HCC): ICD-10-CM

## 2017-09-05 LAB
ALBUMIN SERPL-MCNC: 3.1 G/DL (ref 3.2–4.6)
ALBUMIN/GLOB SERPL: 0.6 {RATIO} (ref 1.2–3.5)
ALP SERPL-CCNC: 92 U/L (ref 50–136)
ALT SERPL-CCNC: 25 U/L (ref 12–65)
ANION GAP SERPL CALC-SCNC: 9 MMOL/L (ref 7–16)
AST SERPL-CCNC: 31 U/L (ref 15–37)
ATRIAL RATE: 69 BPM
BASOPHILS # BLD: 0 K/UL (ref 0–0.2)
BASOPHILS NFR BLD: 0 % (ref 0–2)
BILIRUB SERPL-MCNC: 0.4 MG/DL (ref 0.2–1.1)
BNP SERPL-MCNC: 387 PG/ML
BUN SERPL-MCNC: 22 MG/DL (ref 8–23)
CALCIUM SERPL-MCNC: 8.8 MG/DL (ref 8.3–10.4)
CALCULATED P AXIS, ECG09: 37 DEGREES
CALCULATED R AXIS, ECG10: 36 DEGREES
CALCULATED T AXIS, ECG11: 39 DEGREES
CHLORIDE SERPL-SCNC: 93 MMOL/L (ref 98–107)
CO2 SERPL-SCNC: 29 MMOL/L (ref 21–32)
CREAT SERPL-MCNC: 1.22 MG/DL (ref 0.6–1)
D DIMER PPP FEU-MCNC: 1.73 UG/ML(FEU)
DIAGNOSIS, 93000: NORMAL
DIFFERENTIAL METHOD BLD: ABNORMAL
EOSINOPHIL # BLD: 0 K/UL (ref 0–0.8)
EOSINOPHIL NFR BLD: 0 % (ref 0.5–7.8)
ERYTHROCYTE [DISTWIDTH] IN BLOOD BY AUTOMATED COUNT: 16.6 % (ref 11.9–14.6)
GLOBULIN SER CALC-MCNC: 5.6 G/DL (ref 2.3–3.5)
GLUCOSE SERPL-MCNC: 170 MG/DL (ref 65–100)
HCT VFR BLD AUTO: 36.4 % (ref 35.8–46.3)
HGB BLD-MCNC: 11.9 G/DL (ref 11.7–15.4)
IMM GRANULOCYTES # BLD: 0 K/UL (ref 0–0.5)
IMM GRANULOCYTES NFR BLD: 0.3 % (ref 0–5)
LACTATE BLD-SCNC: 1.9 MMOL/L (ref 0.5–1.9)
LYMPHOCYTES # BLD: 0.7 K/UL (ref 0.5–4.6)
LYMPHOCYTES NFR BLD: 5 % (ref 13–44)
MAGNESIUM SERPL-MCNC: 2.1 MG/DL (ref 1.8–2.4)
MCH RBC QN AUTO: 28.9 PG (ref 26.1–32.9)
MCHC RBC AUTO-ENTMCNC: 32.7 G/DL (ref 31.4–35)
MCV RBC AUTO: 88.3 FL (ref 79.6–97.8)
MONOCYTES # BLD: 0.2 K/UL (ref 0.1–1.3)
MONOCYTES NFR BLD: 2 % (ref 4–12)
NEUTS SEG # BLD: 12.4 K/UL (ref 1.7–8.2)
NEUTS SEG NFR BLD: 93 % (ref 43–78)
P-R INTERVAL, ECG05: 172 MS
PLATELET # BLD AUTO: 345 K/UL (ref 150–450)
PMV BLD AUTO: 11.2 FL (ref 10.8–14.1)
POTASSIUM SERPL-SCNC: 4.5 MMOL/L (ref 3.5–5.1)
PROT SERPL-MCNC: 8.7 G/DL (ref 6.3–8.2)
Q-T INTERVAL, ECG07: 416 MS
QRS DURATION, ECG06: 92 MS
QTC CALCULATION (BEZET), ECG08: 445 MS
RBC # BLD AUTO: 4.12 M/UL (ref 4.05–5.25)
SODIUM SERPL-SCNC: 131 MMOL/L (ref 136–145)
TROPONIN I BLD-MCNC: 0.02 NG/ML (ref 0–0.08)
VENTRICULAR RATE, ECG03: 69 BPM
WBC # BLD AUTO: 13.3 K/UL (ref 4.3–11.1)

## 2017-09-05 PROCEDURE — 84484 ASSAY OF TROPONIN QUANT: CPT

## 2017-09-05 PROCEDURE — 99285 EMERGENCY DEPT VISIT HI MDM: CPT

## 2017-09-05 PROCEDURE — 83880 ASSAY OF NATRIURETIC PEPTIDE: CPT

## 2017-09-05 PROCEDURE — 80053 COMPREHEN METABOLIC PANEL: CPT

## 2017-09-05 PROCEDURE — 96361 HYDRATE IV INFUSION ADD-ON: CPT

## 2017-09-05 PROCEDURE — 83735 ASSAY OF MAGNESIUM: CPT

## 2017-09-05 PROCEDURE — 74011250636 HC RX REV CODE- 250/636

## 2017-09-05 PROCEDURE — 94640 AIRWAY INHALATION TREATMENT: CPT

## 2017-09-05 PROCEDURE — 71260 CT THORAX DX C+: CPT

## 2017-09-05 PROCEDURE — 74011000250 HC RX REV CODE- 250

## 2017-09-05 PROCEDURE — 71010 XR CHEST PORT: CPT

## 2017-09-05 PROCEDURE — 93005 ELECTROCARDIOGRAM TRACING: CPT

## 2017-09-05 PROCEDURE — 85025 COMPLETE CBC W/AUTO DIFF WBC: CPT

## 2017-09-05 PROCEDURE — 85379 FIBRIN DEGRADATION QUANT: CPT

## 2017-09-05 PROCEDURE — 83605 ASSAY OF LACTIC ACID: CPT

## 2017-09-05 PROCEDURE — 96374 THER/PROPH/DIAG INJ IV PUSH: CPT

## 2017-09-05 PROCEDURE — 74011636320 HC RX REV CODE- 636/320

## 2017-09-05 PROCEDURE — 74011000258 HC RX REV CODE- 258

## 2017-09-05 RX ORDER — FUROSEMIDE 10 MG/ML
60 INJECTION INTRAMUSCULAR; INTRAVENOUS
Status: COMPLETED | OUTPATIENT
Start: 2017-09-05 | End: 2017-09-05

## 2017-09-05 RX ORDER — ALBUTEROL SULFATE 0.83 MG/ML
2.5 SOLUTION RESPIRATORY (INHALATION) ONCE
Status: COMPLETED | OUTPATIENT
Start: 2017-09-05 | End: 2017-09-05

## 2017-09-05 RX ORDER — FUROSEMIDE 40 MG/1
40 TABLET ORAL DAILY
Qty: 5 TAB | Refills: 0 | Status: SHIPPED | OUTPATIENT
Start: 2017-09-05 | End: 2017-09-08 | Stop reason: SDUPTHER

## 2017-09-05 RX ORDER — NEBULIZER AND COMPRESSOR
1 EACH MISCELLANEOUS
Qty: 1 EACH | Refills: 0 | Status: SHIPPED | OUTPATIENT
Start: 2017-09-05 | End: 2018-01-01

## 2017-09-05 RX ORDER — ALBUTEROL SULFATE 0.83 MG/ML
2.5 SOLUTION RESPIRATORY (INHALATION) ONCE
Qty: 24 EACH | Refills: 0 | Status: SHIPPED | OUTPATIENT
Start: 2017-09-05 | End: 2017-09-05

## 2017-09-05 RX ORDER — IPRATROPIUM BROMIDE AND ALBUTEROL SULFATE 2.5; .5 MG/3ML; MG/3ML
3 SOLUTION RESPIRATORY (INHALATION)
Status: COMPLETED | OUTPATIENT
Start: 2017-09-05 | End: 2017-09-05

## 2017-09-05 RX ORDER — LEVOFLOXACIN 750 MG/1
750 TABLET ORAL DAILY
Qty: 7 TAB | Refills: 0 | Status: SHIPPED | OUTPATIENT
Start: 2017-09-05 | End: 2017-09-29

## 2017-09-05 RX ORDER — SODIUM CHLORIDE 0.9 % (FLUSH) 0.9 %
10 SYRINGE (ML) INJECTION
Status: COMPLETED | OUTPATIENT
Start: 2017-09-05 | End: 2017-09-05

## 2017-09-05 RX ORDER — SODIUM CHLORIDE 9 MG/ML
125 INJECTION, SOLUTION INTRAVENOUS CONTINUOUS
Status: DISCONTINUED | OUTPATIENT
Start: 2017-09-05 | End: 2017-09-05 | Stop reason: HOSPADM

## 2017-09-05 RX ADMIN — IPRATROPIUM BROMIDE AND ALBUTEROL SULFATE 3 ML: .5; 3 SOLUTION RESPIRATORY (INHALATION) at 10:14

## 2017-09-05 RX ADMIN — Medication 10 ML: at 14:45

## 2017-09-05 RX ADMIN — FUROSEMIDE 60 MG: 10 INJECTION, SOLUTION INTRAMUSCULAR; INTRAVENOUS at 13:25

## 2017-09-05 RX ADMIN — SODIUM CHLORIDE 250 ML: 900 INJECTION, SOLUTION INTRAVENOUS at 13:55

## 2017-09-05 RX ADMIN — SODIUM CHLORIDE 125 ML/HR: 900 INJECTION, SOLUTION INTRAVENOUS at 14:01

## 2017-09-05 RX ADMIN — IOPAMIDOL 100 ML: 755 INJECTION, SOLUTION INTRAVENOUS at 14:45

## 2017-09-05 RX ADMIN — SODIUM CHLORIDE 100 ML: 900 INJECTION, SOLUTION INTRAVENOUS at 14:45

## 2017-09-05 RX ADMIN — ALBUTEROL SULFATE 2.5 MG: 2.5 SOLUTION RESPIRATORY (INHALATION) at 13:03

## 2017-09-05 NOTE — ED TRIAGE NOTES
Pt recently dx with pulmonary fibrosis. Difficulty breathing. 2 duo-neb treatments and 125 mg of solumedrol in route by EMS. .

## 2017-09-05 NOTE — ED PROVIDER NOTES
HPI Comments: 80-year-old female complaining of shortness of breath. Patient has history of pulmonary fibrosis and is scheduled to see her lung doctor tomorrow for her breathing problems. She may require oxygen at home but does not have that this time. Been no fevers or chills just increased wheezing and shortness of breath. Patient is a 80 y.o. female presenting with shortness of breath. The history is provided by the patient. Shortness of Breath   This is a recurrent problem. The problem occurs continuously. The current episode started more than 2 days ago. The problem has been gradually worsening. Pertinent negatives include no fever. It is unknown what precipitated the problem. She has tried oral steroids, inhaled steroids and beta-agonist inhalers for the symptoms. The treatment provided no relief. She has had prior hospitalizations. She has had prior ED visits. Associated medical issues include COPD. Associated medical issues do not include asthma. Associated medical issues comments: primary fibrosis. Past Medical History:   Diagnosis Date    Arthritis     Cancer ([facility] [address]     pre-cancerous cyst on pancreas    Exertional dyspnea     HBP (high blood pressure)     Hypothyroid     PE (pulmonary embolism)     In [facility] 10 days - blood thinner    TB (pulmonary tuberculosis) 193       Past Surgical History:   Procedure Laterality Date    HX APPENDECTOMY  1940    HX BACK SURGERY  2016    cyst removal; burst     HX CARPAL TUNNEL RELEASE Bilateral     HX  SECTION      HX HIP REPLACEMENT Left 2007    HX TONSIL AND ADENOIDECTOMY  193         Family History:   Problem Relation Age of Onset    Tuberculosis Father     Cancer Mother     No Known Problems Sister     No Known Problems Brother        Social History     Social History    Marital status: SINGLE     Spouse name: N/A    Number of children: N/A    Years of education: N/A     Occupational History    Not on file.      Social History Main Topics    Smoking status: Never Smoker    Smokeless tobacco: Never Used    Alcohol use No    Drug use: No    Sexual activity: Not on file     Other Topics Concern    Not on file     Social History Narrative         ALLERGIES: Methotrexate and Sulfa (sulfonamide antibiotics)    Review of Systems   Constitutional: Negative. Negative for activity change and fever. HENT: Negative. Eyes: Negative. Respiratory: Positive for shortness of breath. Cardiovascular: Negative. Gastrointestinal: Negative. Genitourinary: Negative. Musculoskeletal: Negative. Skin: Negative. Neurological: Negative. Psychiatric/Behavioral: Negative. All other systems reviewed and are negative. Vitals:    09/05/17 0943 09/05/17 0955   BP: 190/72    Pulse: 66    Resp: 24    Temp: 97.6 °F (36.4 °C)    SpO2: 91% 97%   Weight: 83.9 kg (185 lb)    Height: 5' 4\" (1.626 m)             Physical Exam   Constitutional: She is oriented to person, place, and time. She appears well-developed and well-nourished. No distress. HENT:   Head: Normocephalic and atraumatic. Right Ear: External ear normal.   Left Ear: External ear normal.   Nose: Nose normal.   Mouth/Throat: Oropharynx is clear and moist. No oropharyngeal exudate. Eyes: Conjunctivae and EOM are normal. Pupils are equal, round, and reactive to light. Right eye exhibits no discharge. Left eye exhibits no discharge. No scleral icterus. Neck: Normal range of motion. Neck supple. No JVD present. No tracheal deviation present. Cardiovascular: Normal rate, regular rhythm and intact distal pulses. Pulmonary/Chest: No stridor. She is in respiratory distress. She has wheezes. She exhibits no tenderness. Abdominal: Soft. Bowel sounds are normal. She exhibits no distension and no mass. There is no tenderness. Musculoskeletal: Normal range of motion. She exhibits no edema or tenderness.    Neurological: She is alert and oriented to person, place, and time. No cranial nerve deficit. Skin: Skin is warm and dry. No rash noted. She is not diaphoretic. No erythema. No pallor. Psychiatric: She has a normal mood and affect. Her behavior is normal. Thought content normal.   Nursing note and vitals reviewed. MDM  Number of Diagnoses or Management Options  Diagnosis management comments: After treatment patient's pulse ox remains 92% on room air considering her pulmonary fibrosis she cannot get any better than this. She is scheduled to see her primary care physician tomorrow for evaluation for supplemental oxygen. Patient's resting comfortably no respiratory distress.        Amount and/or Complexity of Data Reviewed  Clinical lab tests: ordered and reviewed  Tests in the radiology section of CPT®: ordered and reviewed  Tests in the medicine section of CPT®: ordered and reviewed      ED Course       Procedures

## 2017-09-05 NOTE — DISCHARGE INSTRUCTIONS

## 2017-09-05 NOTE — ED NOTES
I have reviewed discharge information with patient. Patient verbalizes understanding. Patient non-ambulatory out of ER with wheelchair. No distress noted.

## 2017-09-15 ENCOUNTER — HOSPITAL ENCOUNTER (OUTPATIENT)
Dept: GENERAL RADIOLOGY | Age: 82
Discharge: HOME | End: 2017-09-15
Attending: FAMILY MEDICINE
Payer: MEDICARE

## 2017-09-15 DIAGNOSIS — J18.9 PNEUMONIA OF LEFT LOWER LOBE DUE TO INFECTIOUS ORGANISM: ICD-10-CM

## 2017-09-15 PROCEDURE — 71020 XR CHEST PA LAT: CPT

## 2017-09-29 ENCOUNTER — HOSPITAL ENCOUNTER (OUTPATIENT)
Dept: GENERAL RADIOLOGY | Age: 82
Discharge: HOME | End: 2017-09-29
Attending: FAMILY MEDICINE
Payer: MEDICARE

## 2017-09-29 DIAGNOSIS — R05.9 COUGH: ICD-10-CM

## 2017-09-29 DIAGNOSIS — J20.9 ACUTE BRONCHITIS, UNSPECIFIED ORGANISM: ICD-10-CM

## 2017-09-29 DIAGNOSIS — J84.10 PULMONARY FIBROSIS (HCC): ICD-10-CM

## 2017-09-29 PROCEDURE — 71020 XR CHEST PA LAT: CPT

## 2017-12-15 ENCOUNTER — HOSPITAL ENCOUNTER (OUTPATIENT)
Dept: GENERAL RADIOLOGY | Age: 82
Discharge: HOME | End: 2017-12-15
Attending: FAMILY MEDICINE
Payer: MEDICARE

## 2017-12-15 DIAGNOSIS — R06.2 WHEEZING: ICD-10-CM

## 2017-12-15 DIAGNOSIS — J84.10 PULMONARY FIBROSIS (HCC): ICD-10-CM

## 2017-12-15 DIAGNOSIS — I49.9 IRREGULAR HEART BEAT: ICD-10-CM

## 2017-12-15 PROCEDURE — 71020 XR CHEST PA LAT: CPT

## 2017-12-23 PROBLEM — I48.91 ATRIAL FIBRILLATION (HCC): Status: ACTIVE | Noted: 2017-01-01

## 2017-12-23 PROBLEM — J40 BRONCHITIS: Status: ACTIVE | Noted: 2017-01-01

## 2017-12-23 PROBLEM — N18.9 CKD (CHRONIC KIDNEY DISEASE): Status: ACTIVE | Noted: 2017-01-01

## 2017-12-23 PROBLEM — A41.9 SEPSIS (HCC): Status: ACTIVE | Noted: 2017-01-01

## 2017-12-23 PROBLEM — R77.8 ELEVATED TROPONIN: Status: ACTIVE | Noted: 2017-01-01

## 2017-12-23 PROBLEM — I50.9 CHF (CONGESTIVE HEART FAILURE) (HCC): Status: ACTIVE | Noted: 2017-01-01

## 2017-12-23 NOTE — ED NOTES
TRANSFER - OUT REPORT:    Verbal report given to JATIN Villalpando(name) on Richie Collins  being transferred to 376(unit) for routine progression of care       Report consisted of patients Situation, Background, Assessment and   Recommendations(SBAR). Information from the following report(s) SBAR, ED Summary and Intake/Output was reviewed with the receiving nurse. Lines:   Peripheral IV 12/23/17 Left Antecubital (Active)   Site Assessment Clean, dry, & intact 12/23/2017  1:48 PM       Peripheral IV 12/23/17 Right; Lower Forearm (Active)        Opportunity for questions and clarification was provided.       Patient transported with:   Monitor   Medic

## 2017-12-23 NOTE — IP AVS SNAPSHOT
Eriberto Ojeda 57 [address] 
[phone] Patient: Juve Benito MRN: [id number] :10/30/1924 About your hospitalization You were admitted on:  2017 You last received care in the:  [address] [place] [address] You were discharged on:  2017 Why you were hospitalized Your primary diagnosis was:  Not on File Your diagnoses also included:  Atrial Fibrillation (Hcc), Bronchitis, Sepsis (Hcc), Leukocytosis, Chf (Congestive Heart Failure) (Hcc), Ckd (Chronic Kidney Disease), Elevated Troponin, Pulmonary Fibrosis (Hcc) Things You Need To Do (next 8 weeks) Follow up with Tali Jordan MD in 1 week(s) Phone:  [phone] Where:  [address] [facility], [address], [address] [address] Friday Dec 29, 2017 Follow Up with Candance Qua, NP at 10:30 AM  
Where:  [address] [facility] ([address] [place] [address])  CONSULT with Jeannine Elise MD at 10:30 AM  
Where:  [facility] OFFICE ([address] [place] [address]) Discharge Orders None A check surya indicates which time of day the medication should be taken. My Medications STOP taking these medications   
 amoxicillin-clavulanate 875-125 mg per tablet Commonly known as:  AUGMENTIN  
   
  
 atenolol 25 mg tablet Commonly known as:  TENORMIN  
   
  
 lisinopril 10 mg tablet Commonly known as:  PRINIVIL, ZESTRIL  
   
  
 potassium chloride 10 mEq tablet Commonly known as:  KLOR-CON  
   
  
 predniSONE 20 mg tablet Commonly known as:  DELTASONE  
   
  
 promethazine 12.5 mg tablet Commonly known as:  PHENERGAN  
   
  
  
TAKE these medications as instructed Instructions Each Dose to Equal  
 Morning Noon Evening Bedtime  
 acetaminophen 325 mg tablet Commonly known as:  TYLENOL  
   
 Take 2 Tabs by mouth every six (6) hours as needed. 650 mg  
    
   
   
   
  
 albuterol-ipratropium 2.5 mg-0.5 mg/3 ml Nebu Commonly known as:  DUO-NEB  
   
 USE 3ML(1 VIAL) VIA NEBULIZER EVERY 6 HOURS AS NEEDED  
     
   
   
   
  
 aspirin delayed-release 81 mg tablet Your next dose is:  Tomorrow Take 1 Tab by mouth daily. 81 mg  
    
   
   
   
  
 azithromycin 500 mg 500 mg IVPB Start taking on:  12/28/2017 Your next dose is:  Tomorrow 500 mg by IntraVENous route every twenty-four (24) hours. 500 mg  
    
   
   
   
  
 budesonide-formoterol 160-4.5 mcg/actuation Hfaa Commonly known as:  SYMBICORT Your next dose is: Today Take 2 Puffs by inhalation two (2) times a day. 2 Puff  
    
   
   
   
  
  
 dilTIAZem  mg ER capsule Commonly known as:  CARDIZEM CD Your next dose is:  Tomorrow Take 1 Cap by mouth daily. 120 mg  
    
   
   
   
  
 furosemide 40 mg tablet Commonly known as:  LASIX Your next dose is:  Tomorrow Take 1 Tab by mouth daily. 40 mg  
    
   
   
   
  
 glucosamine sulfate 500 mg tablet Commonly known as:  GLUCOSAMINE Take 1 Tab by mouth two (2) times a day. 500 mg  
    
   
   
   
  
 levothyroxine 75 mcg tablet Commonly known as:  SYNTHROID Your next dose is:  Tomorrow TAKE 1 TABLET BY MOUTH  DAILY BEFORE BREAKFAST  
     
   
   
   
  
 magnesium oxide 400 mg tablet Commonly known as:  MAG-OX Your next dose is: Today Take 1 Tab by mouth two (2) times a day. 400 mg  
    
   
   
  
   
  
 metoprolol tartrate 50 mg tablet Commonly known as:  LOPRESSOR Your next dose is: Today Take 1 Tab by mouth every twelve (12) hours. 50 mg  
    
   
   
   
  
  
 mirabegron ER 25 mg ER tablet Commonly known as:  MYRBETRIQ Your next dose is:  Tomorrow Take 1 Tab by mouth daily. 25 mg Nebulizer & Compressor machine 1 Each by Does Not Apply route every four (4) hours as needed. 1 Each Nebulizer Accessories Kit Uses directed Where to Get Your Medications Information on where to get these meds will be given to you by the nurse or doctor. ! Ask your nurse or doctor about these medications  
  acetaminophen 325 mg tablet  
 aspirin delayed-release 81 mg tablet  
 azithromycin 500 mg 500 mg IVPB  
 dilTIAZem  mg ER capsule  
 furosemide 40 mg tablet  
 magnesium oxide 400 mg tablet  
 metoprolol tartrate 50 mg tablet Discharge Instructions DISCHARGE SUMMARY from Nurse The following personal items are in your possession at time of discharge: 
 
  
  
  
  
  
Clothing: At bedside PATIENT INSTRUCTIONS: 
 
After general anesthesia or intravenous sedation, for 24 hours or while taking prescription Narcotics: · Limit your activities · Do not drive and operate hazardous machinery · Do not make important personal or business decisions · Do  not drink alcoholic beverages · If you have not urinated within 8 hours after discharge, please contact your surgeon on call. Report the following to your surgeon: 
· Excessive pain, swelling, redness or odor of or around the surgical area · Temperature over 100.5 · Nausea and vomiting lasting longer than 4 hours or if unable to take medications · Any signs of decreased circulation or nerve impairment to extremity: change in color, persistent  numbness, tingling, coldness or increase pain · Any questions What to do at Home: 
Recommended activity: Activity as tolerated, per MD 
 
If you experience any of the following symptoms fever>101, pain unrelieved with medication, nausea/vomiting, shortness of breath, dizziness/fainting, chest pain. , please follow up with your doctor. *  Please give a list of your current medications to your Primary Care Provider. *  Please update this list whenever your medications are discontinued, doses are 
    changed, or new medications (including over-the-counter products) are added. *  Please carry medication information at all times in case of emergency situations. These are general instructions for a healthy lifestyle: No smoking/ No tobacco products/ Avoid exposure to second hand smoke Surgeon General's Warning:  Quitting smoking now greatly reduces serious risk to your health. Obesity, smoking, and sedentary lifestyle greatly increases your risk for illness A healthy diet, regular physical exercise & weight monitoring are important for maintaining a healthy lifestyle You may be retaining fluid if you have a history of heart failure or if you experience any of the following symptoms:  Weight gain of 3 pounds or more overnight or 5 pounds in a week, increased swelling in our hands or feet or shortness of breath while lying flat in bed. Please call your doctor as soon as you notice any of these symptoms; do not wait until your next office visit. Recognize signs and symptoms of STROKE: 
 
F-face looks uneven A-arms unable to move or move unevenly S-speech slurred or non-existent T-time-call 911 as soon as signs and symptoms begin-DO NOT go Back to bed or wait to see if you get better-TIME IS BRAIN. Warning Signs of HEART ATTACK Call 911 if you have these symptoms: 
? Chest discomfort. Most heart attacks involve discomfort in the center of the chest that lasts more than a few minutes, or that goes away and comes back. It can feel like uncomfortable pressure, squeezing, fullness, or pain. ? Discomfort in other areas of the upper body. Symptoms can include pain or discomfort in one or both arms, the back, neck, jaw, or stomach. ? Shortness of breath with or without chest discomfort. ? Other signs may include breaking out in a cold sweat, nausea, or lightheadedness. Don't wait more than five minutes to call [address]! Fast action can save your life. Calling 911 is almost always the fastest way to get lifesaving treatment. Emergency Medical Services staff can begin treatment when they arrive  up to an hour sooner than if someone gets to the hospital by car. The discharge information has been reviewed with the {PATIENT PARENT GUARDIAN:31562}. The {PATIENT PARENT GUARDIAN:32657} verbalized understanding. Discharge medications reviewed with the {Dishcarge meds reviewed QMNN:26977} and appropriate educational materials and side effects teaching were provided. Atrial Fibrillation: Care Instructions Your Care Instructions Atrial fibrillation is an irregular and often fast heartbeat. Treating this condition is important for several reasons. It can cause blood clots, which can travel from your heart to your brain and cause a stroke. If you have a fast heartbeat, you may feel lightheaded, dizzy, and weak. An irregular heartbeat can also increase your risk for heart failure. Atrial fibrillation is often the result of another heart condition, such as high blood pressure or coronary artery disease. Making changes to improve your heart condition will help you stay healthy and active. Follow-up care is a key part of your treatment and safety. Be sure to make and go to all appointments, and call your doctor if you are having problems. It's also a good idea to know your test results and keep a list of the medicines you take. How can you care for yourself at home? Medicines ? · Take your medicines exactly as prescribed. Call your doctor if you think you are having a problem with your medicine. You will get more details on the specific medicines your doctor prescribes. ? · If your doctor has given you a blood thinner to prevent a stroke, be sure you get instructions about how to take your medicine safely.  Blood thinners can cause serious bleeding problems. ? · Do not take any vitamins, over-the-counter drugs, or herbal products without talking to your doctor first. ? Lifestyle changes ? · Do not smoke. Smoking can increase your chance of a stroke and heart attack. If you need help quitting, talk to your doctor about stop-smoking programs and medicines. These can increase your chances of quitting for good. ? · Eat a heart-healthy diet. ? · Stay at a healthy weight. Lose weight if you need to.  
? · Limit alcohol to 2 drinks a day for men and 1 drink a day for women. Too much alcohol can cause health problems. ? · Avoid colds and flu. Get a pneumococcal vaccine shot. If you have had one before, ask your doctor whether you need another dose. Get a flu shot every year. If you must be around people with colds or flu, wash your hands often. Activity ? · If your doctor recommends it, get more exercise. Walking is a good choice. Bit by bit, increase the amount you walk every day. Try for at least 30 minutes on most days of the week. You also may want to swim, bike, or do other activities. Your doctor may suggest that you join a cardiac rehabilitation program so that you can have help increasing your physical activity safely. ? · Start light exercise if your doctor says it is okay. Even a small amount will help you get stronger, have more energy, and manage stress. Walking is an easy way to get exercise. Start out by walking a little more than you did in the hospital. Gradually increase the amount you walk. ? · When you exercise, watch for signs that your heart is working too hard. You are pushing too hard if you cannot talk while you are exercising. If you become short of breath or dizzy or have chest pain, sit down and rest immediately. ? · Check your pulse regularly. Place two fingers on the artery at the palm side of your wrist, in line with your thumb.  If your heartbeat seems uneven or fast, talk to your doctor. When should you call for help? Call 911 anytime you think you may need emergency care. For example, call if: 
? · You have symptoms of a heart attack. These may include: ¨ Chest pain or pressure, or a strange feeling in the chest. 
¨ Sweating. ¨ Shortness of breath. ¨ Nausea or vomiting. ¨ Pain, pressure, or a strange feeling in the back, neck, jaw, or upper belly or in one or both shoulders or arms. ¨ Lightheadedness or sudden weakness. ¨ A fast or irregular heartbeat. After you call 911, the  may tell you to chew 1 adult-strength or 2 to 4 low-dose aspirin. Wait for an ambulance. Do not try to drive yourself. ? · You have symptoms of a stroke. These may include: 
¨ Sudden numbness, tingling, weakness, or loss of movement in your face, arm, or leg, especially on only one side of your body. ¨ Sudden vision changes. ¨ Sudden trouble speaking. ¨ Sudden confusion or trouble understanding simple statements. ¨ Sudden problems with walking or balance. ¨ A sudden, severe headache that is different from past headaches. ? · You passed out (lost consciousness). ?Call your doctor now or seek immediate medical care if: 
? · You have new or increased shortness of breath. ? · You feel dizzy or lightheaded, or you feel like you may faint. ? · Your heart rate becomes irregular. ? · You can feel your heart flutter in your chest or skip heartbeats. Tell your doctor if these symptoms are new or worse. ? Watch closely for changes in your health, and be sure to contact your doctor if you have any problems. Where can you learn more? Go to http://aleksandr-sumaya.info/. Enter U020 in the search box to learn more about \"Atrial Fibrillation: Care Instructions. \" Current as of: September 21, 2016 Content Version: 11.4 © 7986-5543 Healthwise, [employer].  Care instructions adapted under license by Ketty Moeller (which disclaims liability or warranty for this information). If you have questions about a medical condition or this instruction, always ask your healthcare professional. [address] any warranty or liability for your use of this information. Qiandao Announcement We are excited to announce that we are making your provider's discharge notes available to you in Qiandao. You will see these notes when they are completed and signed by the physician that discharged you from your recent hospital stay. If you have any questions or concerns about any information you see in Qiandao, please call the Health Information Department where you were seen or reach out to your Primary Care Provider for more information about your plan of care. Introducing [facility]! Sanam Bagley introduces Qiandao patient portal. Now you can access parts of your medical record, email your doctor's office, and request medication refills online. 1. In your internet browser, go to [email or web address] 2. Click on the First Time User? Click Here link in the Sign In box. You will see the New Member Sign Up page. 3. Enter your Qiandao Access Code exactly as it appears below. You will not need to use this code after youve completed the sign-up process. If you do not sign up before the expiration date, you must request a new code. · Qiandao Access Code: [email or web address] Expires: 3/1/2018  8:44 AM 
 
4. Enter the last four digits of your Social Security Number (xxxx) and Date of Birth (mm/dd/yyyy) as indicated and click Submit. You will be taken to the next sign-up page. 5. Create a Qiandao ID. This will be your Qiandao login ID and cannot be changed, so think of one that is secure and easy to remember. 6. Create a Qiandao password. You can change your password at any time. 7. Enter your Password Reset Question and Answer. This can be used at a later time if you forget your password. 8. Enter your e-mail address. You will receive e-mail notification when new information is available in [address] 9. Click Sign Up. You can now view and download portions of your medical record. 10. Click the Download Summary menu link to download a portable copy of your medical information. If you have questions, please visit the Frequently Asked Questions section of the iTMant website. Remember, ViVu is NOT to be used for urgent needs. For medical emergencies, dial 911. Now available from your iPhone and Android! Unresulted Labs-Please follow up with your PCP about these lab tests Order Current Status CULTURE, BLOOD Preliminary result CULTURE, BLOOD Preliminary result Providers Seen During Your Hospitalization Provider Specialty Primary office phone Palma Madden MD Emergency Medicine [phone] Karie Yu MD Internal Medicine [phone] Immunizations Administered for This Admission Name Date  
 TB Skin Test (PPD) Intradermal  Deferred (), 12/24/2017 Your Primary Care Physician (PCP) Primary Care Physician Office Phone Office Fax [place] [phone] You are allergic to the following Allergen Reactions Methotrexate Other (comments) Damaged kidney and liver Sulfa (Sulfonamide Antibiotics) Hives Recent Documentation Height Weight BMI OB Status Smoking Status 1.651 m 83.5 kg 30.62 kg/m2 Postmenopausal Never Smoker Emergency Contacts Name Discharge Info Relation Home Work Mobile [facility] DISCHARGE CAREGIVER [3] Son [22] [phone] Aicha Lawson DISCHARGE CAREGIVER [3] Friend [5] [phone] Patient Belongings The following personal items are in your possession at time of discharge: Clothing: At bedside Please provide this summary of care documentation to your next provider. Signatures-by signing, you are acknowledging that this After Visit Summary has been reviewed with you and you have received a copy. Patient Signature:  ____________________________________________________________ Date:  ____________________________________________________________  
  
[facility] Signature:  ____________________________________________________________ Date:  ____________________________________________________________

## 2017-12-23 NOTE — ED PROVIDER NOTES
HPI Comments: Patient with history of a blood pressure and dementia. Presents with worsening fatigue over the past couple days along with increased confusion over baseline dementia. Was unable to stand and use her walker this morning so brought here to the ER. She has no complaints here and does not know why she is here. Confused to year. Patient is a 80 y.o. female presenting with fatigue. The history is provided by the patient. No  was used. Fatigue   This is a new problem. The current episode started 2 days ago. The problem has been gradually worsening. There was no focality noted. Primary symptoms include mental status change (slightly worse dementia). Pertinent negatives include no focal weakness, no loss of sensation, no loss of balance, no slurred speech, no speech difficulty, no movement disorder, no agitation and no unresponsiveness. There has been no fever. Associated symptoms include confusion. Pertinent negatives include no shortness of breath, no chest pain, no vomiting, no headaches, no nausea, no bowel incontinence and no bladder incontinence. Associated medical issues include dementia.         Past Medical History:   Diagnosis Date    Arthritis     Cancer ([facility] [address]     pre-cancerous cyst on pancreas    Exertional dyspnea     HBP (high blood pressure)     Hypothyroid     PE (pulmonary embolism)     In [facility] days - blood thinner    TB (pulmonary tuberculosis) 193       Past Surgical History:   Procedure Laterality Date    HX APPENDECTOMY  194    HX BACK SURGERY  2016    cyst removal; burst     HX CARPAL TUNNEL RELEASE Bilateral     HX  SECTION      HX HIP REPLACEMENT Left 2007    HX TONSIL AND ADENOIDECTOMY  193         Family History:   Problem Relation Age of Onset    Tuberculosis Father    Tabitha Sharpe Cancer Mother     No Known Problems Sister     No Known Problems Brother        Social History     Social History    Marital status: SINGLE     Spouse name: N/A    Number of children: N/A    Years of education: N/A     Occupational History    Not on file. Social History Main Topics    Smoking status: Never Smoker    Smokeless tobacco: Never Used    Alcohol use No    Drug use: No    Sexual activity: Not on file     Other Topics Concern    Not on file     Social History Narrative         ALLERGIES: Methotrexate and Sulfa (sulfonamide antibiotics)    Review of Systems   Constitutional: Positive for fatigue. Negative for chills and fever. HENT: Negative for rhinorrhea and sore throat. Eyes: Negative for pain, redness and visual disturbance. Respiratory: Negative for chest tightness, shortness of breath and wheezing. Cardiovascular: Negative for chest pain and leg swelling. Gastrointestinal: Negative for abdominal pain, bowel incontinence, diarrhea, nausea and vomiting. Genitourinary: Negative for bladder incontinence, dysuria and hematuria. Musculoskeletal: Negative for back pain, gait problem, neck pain and neck stiffness. Skin: Negative for color change and rash. Neurological: Positive for weakness. Negative for focal weakness, speech difficulty, numbness, headaches and loss of balance. Psychiatric/Behavioral: Positive for confusion. Negative for agitation. Vitals:    12/23/17 1311   BP: 171/83   Pulse: (!) 112   Resp: 16   Temp: 98.3 °F (36.8 °C)   SpO2: 94%   Weight: 77.1 kg (170 lb)   Height: 5' 5\" (1.651 m)            Physical Exam   Constitutional: She appears well-developed and well-nourished. HENT:   Head: Normocephalic and atraumatic. Eyes: Conjunctivae and EOM are normal. Pupils are equal, round, and reactive to light. Neck: Normal range of motion. Neck supple. Cardiovascular: Normal rate. An irregularly irregular rhythm present. No murmur heard. Pulmonary/Chest: Effort normal and breath sounds normal. She has no wheezes. Abdominal: Soft. Bowel sounds are normal. There is no tenderness. Musculoskeletal: Normal range of motion. She exhibits edema. Neurological: She is alert. She is disoriented. No cranial nerve deficit. Skin: Skin is warm and dry. Nursing note and vitals reviewed. MDM  Number of Diagnoses or Management Options  Diagnosis management comments: Elevated WBC started on prednisone 12/15 10 day course for bronchitis. Elevated lactic acid will treat bronchitis. Also with worsening confusion and weakness and lower extremity edema. CHF with elevated BNP. Will give lasix and admit.         Amount and/or Complexity of Data Reviewed  Clinical lab tests: ordered and reviewed  Tests in the radiology section of CPT®: ordered and reviewed  Tests in the medicine section of CPT®: ordered and reviewed    Patient Progress  Patient progress: stable    ED Course       Procedures

## 2017-12-23 NOTE — H&P
History and Physical    Patient: Cindy Sharma MRN: [id number]  SSN: [id number]    YOB: 1924  Age: 80 y.o. Sex: female      Subjective:    cc: \" I had sob and weakness\"    Cindy Sharma is a 80 y.o. female who has a PMH of HTN, CKD stage III, dementia, hypothyroidism, and abnormal heart beat ( no diagnosis of atrial fibrillation yet ) who was brought in today from her [facility] apartment ( she lives alone ) after she was noted with increased sob on exertion, generalized weakness, bilateral pedal edema and confusion from her baseline dementia. History was obtained from her caregiver Ms Joanna Bennett and Her son Mr Quincy Hatfield. She has been complaining of these for several days. Has had on and off productive cough since 12/15/17 when she visited her PCP and was diagnosed with acute bronchitis treated with augmentin and prednisone. Of note, back then her PCP referred her to a pulmonologist and cardiologist after sob and findings compatible for chronic lung disease with bibasilar honeycombing. Denies nausea, vomiting, fever, chills, syncope, chest pain, abdominal pain, diarrhea, melena nor GI bleeding episodes. Upon arrival to ER: VS bp 171/83  Hr 112  rr 20  t98f  O2: 94%   She was found alert, in no distress, able to speak in full sentences. Pertinent labs included wbc 19k, lactic acid of 2.6, bnp 834, cxr compatible with pulmonary edema and cardiomegaly. ekg showed a fib with rvr. She received 1 dose of 40mg iv lasix, 1 liter ns, levoquin 750mg, prednisone po. She had a negative UA, flu test and procalcitonin < 0.1. Hospitalist was contacted for admission of this unfortunate patient with a diagnosis of sepsis possible due to CAP and af with rvr.     ROS: all pertinent findings described in my note    PMH: as garrett  Social hx: never smoker, no alcohol intake  Family hx: her father had tb and her mother cancer    Past Medical History:   Diagnosis Date    Arthritis     Cancer ([facility] [address] pre-cancerous cyst on pancreas    Exertional dyspnea     HBP (high blood pressure)     Hypothyroid     PE (pulmonary embolism) 2000    In [facility] 10 days - blood thinner    TB (pulmonary tuberculosis) 1930     Past Surgical History:   Procedure Laterality Date    HX APPENDECTOMY  1940    HX BACK SURGERY  2016    cyst removal; burst     HX CARPAL TUNNEL RELEASE Bilateral     HX  SECTION      HX HIP REPLACEMENT Left 2007    HX TONSIL AND ADENOIDECTOMY  1931      Family History   Problem Relation Age of Onset    Tuberculosis Father     Cancer Mother     No Known Problems Sister     No Known Problems Brother      Social History   Substance Use Topics    Smoking status: Never Smoker    Smokeless tobacco: Never Used    Alcohol use No      Prior to Admission medications    Medication Sig Start Date End Date Taking? Authorizing Provider   levothyroxine (SYNTHROID) 75 mcg tablet TAKE 1 TABLET BY MOUTH  DAILY BEFORE BREAKFAST 17   Kevon Sales NP   predniSONE (DELTASONE) 20 mg tablet Take 1 Tab by mouth daily (with breakfast). 12/15/17   Arlet Hwang MD   amoxicillin-clavulanate (AUGMENTIN) 875-125 mg per tablet Take 1 Tab by mouth every twelve (12) hours. 12/15/17   Arlet Hwang MD   atenolol (TENORMIN) 25 mg tablet Take 1 Tab by mouth two (2) times a day. 17   Douglas Cordova NP   glucosamine sulfate (GLUCOSAMINE) 500 mg tablet Take 1 Tab by mouth two (2) times a day. 17   Arlet Hwang MD   promethazine (PHENERGAN) 12.5 mg tablet Take 1 Tab by mouth every six (6) hours as needed for Nausea. 17   Arlet Hwang MD   albuterol-ipratropium (DUO-NEB) 2.5 mg-0.5 mg/3 ml nebu USE 3ML(1 VIAL) VIA NEBULIZER EVERY 6 HOURS AS NEEDED 17   Arlet Hwang MD   lisinopril (PRINIVIL, ZESTRIL) 10 mg tablet Take 1 Tab by mouth daily.  17   Douglas Cordova NP   furosemide (LASIX) 40 mg tablet TAKE 1 TABLET BY MOUTH DAILY 17   Douglas Cordova NP   potassium chloride (KLOR-CON) 10 mEq tablet TAKE 1 TABLET BY MOUTH DAILY 9/8/17   Adina Avendano NP   Nebulizer & Compressor machine 1 Each by Does Not Apply route every four (4) hours as needed. 9/5/17   Ele Hutson MD   Nebulizer Accessories kit Uses directed 9/5/17   Ele Hutson MD   budesonide-formoterol [facility]) 160-4.5 mcg/actuation HFA inhaler Take 2 Puffs by inhalation two (2) times a day. 6/19/17   Josefa Frost MD   mirabegron ER (MYRBETRIQ) 25 mg ER tablet Take 1 Tab by mouth daily. 12/9/16   Edward Hoskins NP        Allergies   Allergen Reactions    Methotrexate Other (comments)     Damaged kidney and liver    Sulfa (Sulfonamide Antibiotics) Hives       Review of Systems:  A comprehensive review of systems was negative except for that written in the History of Present Illness. Objective:     Vitals:    12/23/17 1311   BP: 171/83   Pulse: (!) 112   Resp: 16   Temp: 98.3 °F (36.8 °C)   SpO2: 94%   Weight: 77.1 kg (170 lb)   Height: 5' 5\" (1.651 m)        Physical Exam:  GENERAL: alert, cooperative, no distress, appears stated age  EYE: negative  LYMPHATIC: Cervical, supraclavicular, and axillary nodes normal.   THROAT & NECK: normal and no erythema or exudates noted. No JVD  LUNG: Bilateral rales, rhonchi- minimal wheezing   HEART: irregular rate and rhythm, no murmur, click, rub or gallop  ABDOMEN: soft, non-tender. Bowel sounds normal. No masses,  no organomegaly  EXTREMITIES:  Bilateral pedal edema grade II up to the knees   SKIN: Normal.  NEUROLOGIC: negative  Vascular: pulses present   PSYCHIATRIC: non focal    Assessment:     Hospital Problems  Date Reviewed: 12/7/2017          Codes Class Noted POA    Atrial fibrillation ([facility] ICD-10-CM: I48.91  ICD-9-CM: 427.31  12/23/2017 Unknown        Bronchitis ICD-10-CM: J40  ICD-9-CM: 618  12/23/2017 Unknown        Sepsis ([facility] ICD-10-CM: A41.9  ICD-9-CM: 038.9, 995.91  12/23/2017 Unknown              Plan:     1.  Sepsis due to possible URI, likely acute bronchitis: procalcitonin was < 0.1, cxr no infiltrates   2. Pulmonary edema in the setting of cardiomegaly, sob, pedal edema and elevated bnp: likely acute on Chronic systolic heart failure  3. New onset atrial fibrillation with RVR: [id number] score: 5  4. Non thrombotic troponin elevation, probable to atrial fibrillation- demand  5. Elevated lfts, possible hepatic congestion due to problem 2 and 3  6. CKD stage III  7. Dementia    Plan:    -admit under telemetry monitoring  -cardiac diet  -strict I/O  -Start rocephin and zithromax   -Start lopressor   -duonebs  -lasix 40mg iv bid  -Cardizem 20mg ivp stat   -resume home meds  -start aspirin 81 mg po day ( after a long discussion about oac, her son and her caregiver stated she is at high risk for falls and her best choice at the moment would be aspirin.  They were explained about high risk for CVA with no OAC )  -complete 3 sets of troponins  -monitor lactic acid q 6hr  -blood cultures x 2  -cbc, chem, mag and lfts daily  -check abdomen US  -CXR in am  -DVT ppx: heparin sq    Code status: DNR ( I discussed advanced directives with her son and caregiver, patient has a written will to be DNR )    Estimated LOS > 2MN  Estimated DC planning: home, home pt  Risk: high  Goals of care discussed with her son and caregiver    **emergency contact: [facility] ): Mr Marcella King: [phone]    Signed By: Teresita Sutton MD     December 23, 2017

## 2017-12-23 NOTE — PROGRESS NOTES
TRANSFER - IN REPORT:    Verbal report received from ED on bAhijit Peck  being received from ED for routine progression of care      Report consisted of patients Situation, Background, Assessment and   Recommendations(SBAR). Information from the following report(s) SBAR, Kardex, ED Summary, MAR, Recent Results, Med Rec Status and Cardiac Rhythm A. fib rate controlled was reviewed with the receiving nurse. Opportunity for questions and clarification was provided. Assessment completed upon patients arrival to unit and care assumed.

## 2017-12-24 NOTE — PROGRESS NOTES
Discussed with patient's son about what could be causing redness and an open wound on back of foot. He thinks that it is the corduroy material on the foot rest of the chair she sits in. Son will talk with [employer] to place a pillow case over the foot rest.  Pt. States she does not wear shoes at home.

## 2017-12-24 NOTE — PROGRESS NOTES
Progress Note    Patient: Paul Gregg MRN: [id number]  SSN: [id number]    YOB: 1924  Age: 80 y.o. Sex: female      Admit Date: 12/23/2017    LOS: 1 day     Subjective:   Patient examined at bedside. She was having her breakfast, speaking in full sentences. She stated feeling better. Her A fib is much controlled. Echo still pending. Denies nausea, vomiting, chest pain, diarrhea. Ros: all pertinent findings described in my note. Objective:     Vitals:    12/24/17 0302 12/24/17 0402 12/24/17 0502 12/24/17 0504   BP: 146/67 165/69 135/82 135/82   Pulse: (!) 111 (!) 114  (!) 103   Resp: (!) 48 26  28   Temp:       SpO2: 94% 92%  94%   Weight:       Height:            Intake and Output:  Current Shift:    Last three shifts: 12/22 1901 - 12/24 0700  In: 180 [P.O.:180]  Out: 1075 [Urine:1075]    Physical Exam:   GENERAL: alert, cooperative, no distress, appears stated age  EYE: negative  LYMPHATIC: Cervical, supraclavicular, and axillary nodes normal.   THROAT & NECK: normal and no erythema or exudates noted. LUNG: bilateral wheezing, basal rales   HEART: irregular rate and rhythm, no murmur, click, rub or gallop  ABDOMEN: soft, non-tender. Bowel sounds normal. No masses,  no organomegaly  EXTREMITIES:  Extremities atraumatic, no cyanosis, but she has bilateral pedal edema, much improved from yesterday   SKIN: Normal.  NEUROLOGIC: negative  PSYCHIATRIC: non focal    Lab/Data Review: All lab results for the last 24 hours reviewed. Assessment:     Active Problems:    Leukocytosis (11/12/2014)      Atrial fibrillation ([facility] [address] (12/23/2017)      Bronchitis (12/23/2017)      Sepsis ([facility] [address] (12/23/2017)      CHF (congestive heart failure) ([facility] [address] (12/23/2017)      CKD (chronic kidney disease) (12/23/2017)      Elevated troponin (12/23/2017)        Plan:     1.  Sepsis due to possible likely acute bronchitis: procalcitonin was < 0.1, cxr no infiltrates: lactic acid came back to normal: she has a history of possible chronic fibrotic lung changes on chest ct from 9/2017: check chest ct scan - continue rocephin and zithromax- duonebs-    2. Pulmonary edema in the setting of cardiomegaly, sob, pedal edema and elevated bnp: likely acute on Chronic systolic heart failure: continue lasix iv- echo- cardiology consult as per findings-     3. New onset atrial fibrillation with RVR: [id number] score: 5: on aspirin only as per family requests ( no candidate for SCRM due to risk for falls )- increase lopressor dose    4. Non thrombotic troponin elevation, probable to atrial fibrillation- demand: stable trop levels    5. Elevated lfts, possible hepatic congestion due to problem 2 and 3: she has findings on today's US of pancreatic duct dilatation compatible with chronic pancreatitis, also cholelithiasis monitor-    6. CKD stage III: stable, avoid nephrotoxic meds    7. Dementia: stable     -DVT ppx: heparin sq     Code status: DNR      **emergency contact: [facility] ): Mr Abad Pretty: [phone]      Signed By: Ginger Banda MD     December 24, 2017

## 2017-12-24 NOTE — PROGRESS NOTES
Care Management Interventions  Current Support Network: Has Personal Caregivers  80 y.o. F adm 12/23. Son  Maya Zamora ([phone]). Sepsis, Atril fib, bronchitis. Spoke with patient  she has caregiver daily 8-1 then 6-10. I asked ICU to place ppd. There is a PT order in but no note yet.

## 2017-12-24 NOTE — PROGRESS NOTES
Dual skin assessment performed with JATIN Wright. Bilat heels red/boggy, left heel with open blister. Mepilex applied to both heels, and heels elevated with pillows- wound care consulted. Anasarca pitting +1 BUE, and pitting +2/+3 BLE. Sacrum intact, alleyvn applied. Pure wick initiated for strict I/Os.

## 2017-12-24 NOTE — PROGRESS NOTES
To x-ray for CT scan via wheelchair. Pt. Max. Assist to wheelchair. Had difficulty lifting feet up to walk.

## 2017-12-25 PROBLEM — J84.10 PULMONARY FIBROSIS (HCC): Status: ACTIVE | Noted: 2017-01-01

## 2017-12-25 NOTE — PROGRESS NOTES
Patient remain awake. Confuse. Reorient to surrounding. Bed in low/lock position. Bed alarm on. Call light within reach.

## 2017-12-25 NOTE — PROGRESS NOTES
Patient alert and oriented X 2. Follow simple commands. Denies pain. Head of bed elevated. Respiration even and unlabored. Oxygen saturation 94 % on room air. Head of bed elevated. Atrial fib on cardiac monitor. Heart rate 112. Allevyn peeled back. Sacral/coccyx red and blanchable. Left heel with small redden area from blister. Small Allevyn applied. Right heel red and boggy. Lower extremities elevated on pillows. Bed in low/lock position. Bed alarm on. Call light within reach.

## 2017-12-25 NOTE — PROGRESS NOTES
TRANSFER - IN REPORT:    Verbal report received from MARTHA on Wendie Stuart  being received from ICU(unit) for routine progression of care      Report consisted of patients Situation, Background, Assessment and   Recommendations(SBAR). Information from the following report(s) SBAR was reviewed with the receiving nurse. Opportunity for questions and clarification was provided. Assessment completed upon patients arrival to unit and care assumed.

## 2017-12-25 NOTE — PROGRESS NOTES
TRANSFER - OUT REPORT:    Verbal report given to JATIN Harper on Olivia Lever  being transferred to [facility]  for routine progression of care       Report consisted of patients Situation, Background, Assessment and   Recommendations(SBAR). Information from the following report(s) SBAR, Kardex, ED Summary, Procedure Summary, Intake/Output, MAR, Recent Results and Cardiac Rhythm afib was reviewed with the receiving nurse. Lines:   Peripheral IV 12/23/17 Left Antecubital (Active)   Site Assessment Clean, dry, & intact 12/25/2017  7:10 AM   Phlebitis Assessment 0 12/25/2017  7:10 AM   Infiltration Assessment 0 12/25/2017  7:10 AM   Dressing Status Clean, dry, & intact 12/25/2017  7:10 AM   Dressing Type Tape;Transparent 12/25/2017  7:10 AM   Hub Color/Line Status Pink;Capped 12/25/2017  7:10 AM   Alcohol Cap Used No 12/25/2017  7:10 AM        Opportunity for questions and clarification was provided.       Patient transported with:   Scayl

## 2017-12-25 NOTE — PROGRESS NOTES
Progress Note    Patient: Kimberly Taylor MRN: [id number]  SSN: [id number]    YOB: 1924  Age: 80 y.o. Sex: female      Admit Date: 12/23/2017    LOS: 2 days     Subjective:   Patient examined at bedside. She had no overnight events. Feeling better. Needs to continue PT/OT since she was found very weak. A fib still present, will add cardizem. Denies nausea, vomiting, chest pain, diarrhea. Objective:     Vitals:    12/25/17 0055 12/25/17 0255 12/25/17 0455 12/25/17 0655   BP: 110/64 132/69 131/80 147/72   Pulse: 80 91 (!) 106 (!) 104   Resp: 24 22 22 23   Temp:  98.1 °F (36.7 °C)     SpO2: 96% 94% 96% 98%   Weight:       Height:            Intake and Output:  Current Shift:    Last three shifts: 12/23 1901 - 12/25 0700  In: 580 [P.O.:330; I.V.:250]  Out: 3875 [Urine:3875]    Physical Exam:   GENERAL: alert, cooperative, no distress, appears stated age  EYE: negative  LYMPHATIC: Cervical, supraclavicular, and axillary nodes normal.   THROAT & NECK: normal and no erythema or exudates noted. LUNG: no wheezing, no rales   HEART: irregular rate and rhythm, no murmur, click, rub or gallop  ABDOMEN: soft, non-tender. Bowel sounds normal. No masses,  no organomegaly  EXTREMITIES:  Extremities atraumatic, no cyanosis, but she has bilateral pedal edema, much improved from yesterday   SKIN: Normal.  NEUROLOGIC: negative  PSYCHIATRIC: non focal    Lab/Data Review: All lab results for the last 24 hours reviewed. Assessment:     Active Problems:    Leukocytosis (11/12/2014)      Atrial fibrillation ([address] (12/23/2017)      Bronchitis (12/23/2017)      Sepsis ([address] (12/23/2017)      CHF (congestive heart failure) ([address] (12/23/2017)      CKD (chronic kidney disease) (12/23/2017)      Elevated troponin (12/23/2017)        Plan:     1.  Sepsis due to possible likely acute bronchitis on top of pulmonary fibrosis noted on chest CT: continue rocephin and zithromax- duonebs- No need for oxygen she saturates 93% on room air     2. Pulmonary edema in the setting of cardiomegaly, sob, pedal edema and elevated bnp: likely acute on Chronic systolic heart failure: continue lasix iv- echo-     3. New onset atrial fibrillation with RVR: [id number] score: 5: on aspirin only as per family requests ( no candidate for [address] due to risk for falls )- on lopressor - start cardizem po     4. Non thrombotic troponin elevation, probable to atrial fibrillation- demand: stable trop levels    5. Elevated lfts, possible hepatic congestion due to problem 2 and 3:  US showed pancreatic duct dilatation compatible with chronic pancreatitis, cholelithiasis: LFTs improved-  monitor-    6. CKD stage III: stable, avoid nephrotoxic meds    7. Dementia: stable     -DVT ppx: heparin sq     Code status: DNR      **emergency contact: [facility] ): Mr Elias Plain: [phone]    She may be transfer to medicine floors in the afternoon if her a fib improves       Signed By: Patience Mcdonald MD     December 25, 2017

## 2017-12-26 NOTE — PROGRESS NOTES
Problem: Self Care Deficits Care Plan (Adult)  Goal: *Acute Goals and Plan of Care (Insert Text)  1. Patient will perform grooming with supervision. 2. Patient will perform Upper body dressing with stand by assistance and verbal cues. 3. Patient will perform lower body dressing with moderate assistance. 4. Patient will perform upper and lower body bathing with moderate assistance. 5. Patient will perform toilet transfers with moderate assistance. 6. Patient will participate in 30 + minutes of ADL/ therapeutic exercise/therapeutic activity with min rest breaks to increase activity tolerance for self care. 7. Patient will perform ADL functional mobility in room with minimal assistance and rolling walker. Goals to be achieved in 7 days. OCCUPATIONAL THERAPY: Initial Assessment, Daily Note, Treatment Day: Day of Assessment and AM 12/26/2017  INPATIENT: Hospital Day: 4  Payor: [facility] MEDICARE COMPLETE / Plan: [address] / Product Type: [employer] Care Medicare /      NAME/AGE/GENDER: Edvin Johnson is a 80 y.o. female   PRIMARY DIAGNOSIS:  Sepsis ([facility]  Atrial fibrillation ([facility]  Bronchitis <principal problem not specified> <principal problem not specified>        ICD-10: Treatment Diagnosis:    · Generalized Muscle Weakness (M62.81)   Precautions/Allergies:     Methotrexate and Sulfa (sulfonamide antibiotics)      ASSESSMENT:     Ms. Marnie Tavarez presents with above diagnosis. Pt was noted to have significant generalized weakness decreased balance and overall decreased independence with self care and functional mobility. Pt would benefit from OT to maximize independence but will likely be unable to return home as she lives alone. Pt will likely benefit from short term rehab. This section established at most recent assessment   PROBLEM LIST (Impairments causing functional limitations):  1. Decreased Strength  2. Decreased ADL/Functional Activities  3.  Decreased Transfer Abilities  4. Decreased Ambulation Ability/Technique  5. Decreased Balance  6. Decreased Activity Tolerance  7. Decreased Cognition   INTERVENTIONS PLANNED: (Benefits and precautions of occupational therapy have been discussed with the patient.)  1. Activities of daily living training  2. Adaptive equipment training  3. Balance training  4. Cognitive training  5. Therapeutic activity  6. Therapeutic exercise     TREATMENT PLAN: Frequency/Duration: Follow patient 3 times per week to address above goals. Rehabilitation Potential For Stated Goals: Fair     RECOMMENDED REHABILITATION/EQUIPMENT: (at time of discharge pending progress): Due to the probability of continued deficits (see above) this patient will likely need continued skilled occupational therapy after discharge. Equipment:    None at this time              OCCUPATIONAL PROFILE AND HISTORY:   History of Present Injury/Illness (Reason for Referral):  weakness  Past Medical History/Comorbidities:   Ms. Ashu Robertson  has a past medical history of Arthritis; Cancer ([address] Exertional dyspnea; HBP (high blood pressure); Hypothyroid; PE (pulmonary embolism) (); and TB (pulmonary tuberculosis) (1930). She also has no past medical history of Adverse effect of anesthesia; Difficult intubation; Hematuria, microscopic; Kidney stone; Malignant hyperthermia due to anesthesia; Nausea & vomiting; or Pseudocholinesterase deficiency. Ms. Ashu Robertson  has a past surgical history that includes hx appendectomy (1940); hx tonsil and adenoidectomy (193); hx hip replacement (Left, ); hx  section; hx carpal tunnel release (Bilateral); and hx back surgery (2016).   Social History/Living Environment:   Home Environment: Private residence  One/Two Story Residence: One story  Living Alone: Yes  Support Systems: Family member(s), Other (comments) (comfort keepers-aides)  Patient Expects to be Discharged to[de-identified] Rehabilitation facility  Current DME Used/Available at Home: [employer] chair, Walker  Tub or Shower Type: Tub/Shower combination  Prior Level of Function/Work/Activity:  Pt reports she lives alone but has a service come in to help with some self care     Number of Personal Factors/Comorbidities that affect the Plan of Care: Brief history (0):  LOW COMPLEXITY   ASSESSMENT OF OCCUPATIONAL PERFORMANCE[de-identified]   Activities of Daily Living:           Basic ADLs (From Assessment) Complex ADLs (From Assessment)   Basic ADL  Feeding: Setup  Oral Facial Hygiene/Grooming: Setup (verbal cues)  Bathing: Maximum assistance  Upper Body Dressing: Minimum assistance  Lower Body Dressing: Total assistance  Toileting: Total assistance (transferred to [facility] total assistance with cleaning and brief change)     Grooming/Bathing/Dressing Activities of Daily Living     Cognitive Retraining  Safety/Judgement: Awareness of environment                 Functional Transfers  Toilet Transfer : Maximum assistance     Bed/Mat Mobility  Supine to Sit: Moderate assistance;Maximum assistance  Sit to Supine:  (stayed up in chair)  Sit to Stand: Moderate assistance;Assist x2       Most Recent Physical Functioning:   Gross Assessment:                  Posture:  Posture (WDL): Exceptions to WDL  Posture Assessment: Forward head, Rounded shoulders, Trunk flexion  Balance:  Sitting: Intact; High guard  Standing: Impaired  Standing - Static: Fair  Standing - Dynamic : Fair (fair(-)) Bed Mobility:  Supine to Sit: Moderate assistance;Maximum assistance  Sit to Supine:  (stayed up in chair)  Wheelchair Mobility:     Transfers:  Sit to Stand:  Moderate assistance;Assist x2  Stand to Sit: Moderate assistance;Assist x2              Patient Vitals for the past 6 hrs:   BP BP Patient Position SpO2 Pulse   12/26/17 1103 107/57 Sitting 94 % 94   12/26/17 1337 - - 92 % -       Mental Status  Neurologic State: Alert  Orientation Level: Oriented to person  Cognition: Follows commands  Perception: Appears intact  Perseveration: No perseveration noted  Safety/Judgement: Awareness of environment                          Physical Skills Involved:  1. Balance  2. Strength  3. Activity Tolerance Cognitive Skills Affected (resulting in the inability to perform in a timely and safe manner):  1. Short Term Recall Psychosocial Skills Affected:  1. Environmental Adaptation   Number of elements that affect the Plan of Care: 3-5:  MODERATE COMPLEXITY   CLINICAL DECISION MAKIN[address] [place] [address] AM-PAC 6 Clicks   Daily Activity Inpatient Short Form  How much help from another person does the patient currently need. .. Total A Lot A Little None   1. Putting on and taking off regular lower body clothing? [x] 1   [] 2   [] 3   [] 4   2. Bathing (including washing, rinsing, drying)? [x] 1   [] 2   [] 3   [] 4   3. Toileting, which includes using toilet, bedpan or urinal?   [] 1   [x] 2   [] 3   [] 4   4. Putting on and taking off regular upper body clothing? [] 1   [x] 2   [] 3   [] 4   5. Taking care of personal grooming such as brushing teeth? [] 1   [] 2   [x] 3   [] 4   6. Eating meals? [] 1   [] 2   [x] 3   [] 4   © , Trustees of [address] [place] [address], under license to [employer]. All rights reserved      Score:  Initial: 12 Most Recent: X (Date: -- )    Interpretation of Tool:  Represents activities that are increasingly more difficult (i.e. Bed mobility, Transfers, Gait). Score 24 23 22-20 19-15 14-10 9-7 6     Modifier CH CI CJ CK CL CM CN      ?  Self Care:     - CURRENT STATUS: CL - 60%-79% impaired, limited or restricted    - GOAL STATUS: CK - 40%-59% impaired, limited or restricted    - D/C STATUS:  ---------------To be determined---------------  Payor: [facility] MEDICARE COMPLETE / Plan: [facility] MEDICARE COMPLETE / Product Type: Managed Care Medicare /      Medical Necessity:     · Patient is expected to demonstrate progress in strength, balance, coordination and functional technique to increase independence with self care.  Reason for Services/Other Comments:  · Patient would benefit from OT to maximize independence . Use of outcome tool(s) and clinical judgement create a POC that gives a: LOW COMPLEXITY         TREATMENT:   (In addition to Assessment/Re-Assessment sessions the following treatments were rendered)     Pre-treatment Symptoms/Complaints:  No complaint of pain  Pain: Initial:   Pain Intensity 1: 0 0 Post Session:  0   Assessment as well as:  Self Care: (15min): Procedure(s) (per grid) utilized to improve and/or restore self-care/home management as related to toileting. Required moderate verbal and   cueing to facilitate activities of daily living skills and compensatory activities. Braces/Orthotics/Lines/Etc:   · O2 Device: Room air  Treatment/Session Assessment:    · Response to Treatment:  Pt tolerated fair   · Interdisciplinary Collaboration:   o Physical Therapist  o Occupational Therapist  o Registered Nurse  · After treatment position/precautions:   o Up in chair  o Bed alarm/tab alert on  o Bed/Chair-wheels locked  o Call light within reach  o RN notified   · Compliance with Program/Exercises: compliant all of the time. · Recommendations/Intent for next treatment session: \"Next visit will focus on advancements to more challenging activities and reduction in assistance provided\".   Total Treatment Duration:  OT Patient Time In/Time Out  Time In: 0940  Time Out: 88 Rosibel Torres OT

## 2017-12-26 NOTE — ROUTINE PROCESS
Respiratory Care Services     Policy Number: -[id number]    Title: Patient Care Assessment Program    Effective Date: 01/1999    Revised Date: 05/2014    Reviewed Date: 06/2013/ 03/2015, 03/2016, 06/2017     Overview  In an effort to improve quality and reduce costs of respiratory care at [facility], the Respiratory Department has developed a number of Patient Care Protocols. These protocols have been developed according to [employer] and are utilized for those patients who are ordered respiratory therapy using therapeutic indications and standardized approaches for accomplishing objectives. Patient Care Protocols are intended to improve care by:   Defining the indications and standards of care agreed upon by the Pulmonary Medicine and [address] [place] [address] of [facility].  Training respiratory care practitioners to apply those criteria to individual patients and modify therapy as indicated by the protocols.  Documenting the indication and care plan as part of the initial ordering process.  Tapering or discontinuing treatments once the indication for therapy changes. The Patient Care Protocols shall be universally applied throughout the hospital as determined by   the Pulmonary Medicine and [address] [place] [address].     Rationale for Patient Care Assessment Protocols:   Continuous Quality Improvement   Cost containment   Standardization of care   Enhanced continuity of care   Utilization review   Timely intervention    The following patient care assessment protocols have been developed:   Aerosolized Medication    Bronchial Hygiene    Oxygen Weaning Protocol   Volume Expansion/Secretion Clearance Non-Vented   Ventilator Weaning Protocol   CVRU Fast Track Weaning Protocol   Asthma Treatment Protocol ER   Pediatric Asthma Treatment Protocol ER   Alpha-1 Antitrypsin Deficiency Protocol   Prone Positioning Protocol    The Director of [address] [place] [address] oversees the Patient Care Assessment Program. The Clinical/ is responsible for protocol development and training. The Supervisor is responsible for implementation and  activities. Each patient with an order for respiratory treatments will receive an evaluation. All Registered Respiratory Care Practitioners (RCPs) will perform the evaluations. The same evaluation tool will be utilized for all initial and follow-up assessments. If the patient does not meet criteria for ordered therapy, the therapy will be discontinued. If the patient demonstrates an adverse response to initially ordered therapy, the therapy will be discontinued and the physician will be contacted. Specific physician's orders that deviate from protocols and are deemed \"inappropriate\" or \"unsafe\" will be addressed with ordering physician and/or medical director as required. Patients of [facility] will not be assessed for the first 24 hours as the Medical Director of [address] [place] [address] has requested that changes in therapy should not be made during that time frame. Respiratory Patient Care Assessment Protocols                           I.  Policy: In an effort to provide quality patient care and effective utilization of services, physicians who order respiratory therapy will have their patients treated via the protocols established (see attached). All Registered [address] (RCPs) will complete the initial assessment. This assessment will indicate patient needs, and the care plan developed for the patient and will performed within 24 hours of admission. Frequency of the therapy will be set according to the results of the respiratory therapy evaluation and frequency guidelines policy.  Reassessment will be continued done every 48 hours and more frequently as needed for the individual patient. II. Purpose:     A. To provide a process that will allow for ongoing assessment and care plan modification for patients receiving respiratory services based on both objective and or subjective patient responses to interventions. This process of protocol utilization will assist in patient care progression while eliminating the need for the physician to continually update respiratory therapy orders. B. To assure continuity of respiratory care that meets [facility] Clinical Practice Guidelines. III. Initiation:  Implement Respiratory Care Protocols for patients who are ordered by physician          to receive respiratory therapy procedures or for ventilator management. IV. Protocol:  A. Upon receiving an order for therapy the RCP will review the patients EMR (electronic medical record) for all pertinent information includin. Physicians order for therapy  2. Patient history and physical examination  3. Physician progress notes  4. Diagnostic. X-rays, PFTs, arterial blood gases etc.      B. The RCP will perform a respiratory assessment in the following manner:  1. Perform hand hygiene per hospital policy utilizing Standard Precautions for all patients and following transmission-based isolation as indicated per policy. 2. Identify patient via ID bracelet verifying patient name and birth date. 3. General observations: color, pattern and effort of breathing, chest expansion, (symmetrical and bilateral), level of consciousness and the ability to ambulate. 4. The RCP will assess patients cough ability and determine if Nasotracheal suctioning is needed. If patient is unable to produce sputum, at that time, the RCP should question the patient with regard to their sputum: production, color consistency, frequency and amount.   5. Auscultation: Using a stethoscope, the RCP will listen and note quality of breath sounds and presence or absence of adventitious breath sounds in all lung fields, both anteriorly and posteriorly. 6. Upon completing the EMR review and physical assessment, the RCP will document findings in the RT Assessment section of the EMR. The score level will be provided and will be used to determine the frequency of therapy. V. Indications:   A. Indications for Bronchial Hygiene Protocol will include:  1. Potential for or presence of atelectasis. 2. Need for hydration and removal of retained secretions. 3. Need for improvement of cough effectiveness. 4. Presence of conditions associated with disorder of pulmonary clearance:  a. Cystic fibrosis  b. Bronchiectasis  B. Indications for Aerosolized Medication(s) Protocol should include:  1. Treatment of bronchospasm/wheezing  2. Improvement of mucociliary clearance  3. Treatment of stridor  4. History of Asthma or COPD             C.  Indications for Oxygen Therapy Protocol should include:  1. Documented hypoxemia  2. Severe trauma  3. Acute myocardial infarction  4. Short-term therapy (e.g. post anesthesia recovery)    VI. Maintenance:    A. Timely patient assessment is an integral part of this protocol therefore the following will be applied:  1. All non- critical care patients will be evaluated upon receiving initial respiratory care orders within 24 hours and re-evaluated within 48 hours (or more as needed). 2. Orders requesting a Respiratory Consult will be responded to in the following manner:  a. In patient emergency situations, the RCP assigned to the floor will respond immediately to the patient, provide an initial respiratory assessment, and contact the patients physician as necessary for appropriate orders. b. In non-emergent situations, the RCP assigned to the floor will respond to the patient within 90 minutes and provide an initial respiratory assessment and contact patients physician as necessary for appropriate orders. c. An RCP will provide a comprehensive assessment as soon as possible.   3. Upon completion of an evaluation, the RCP will complete documentation in the patients EMR in the RT Assessment section. 4. The RCP who completes the assessment will document orders for therapy in the orders section of the patients EMR selecting new order. Next, per protocol should be selected indicating it is a protocol order and sign orders should be selected to complete the process. 5. The Pharmacy and Therapeutics (P&T) Committee has mandated that the medication Xopenex may be changed to unit dose albuterol without an order, except for those patients receiving Xopenex due to cardiac arrhythmias. The dosage for these patients should be 0.63 mg. and may be changed from 1.25 mg. to 0.63 mg per P & T Committee by the RCP completing the assessment. 6. Patients who are not experiencing cardiac arrhythmias, and are ordered Xopenex and Atrovent may be changed to Duoneb. VII. Safety:        A. The following safety issues shall be monitored:  1. The RCP will perform hand hygiene per hospital policy utilizing Standard Precautions for all patients and following transmission-based isolation as indicated per hospital policy. 2. The RCP must exercise professional judgment in classifying the patient for frequency of therapy. 3. Appropriate classification of the patient will require an evaluation utilizing the Therapy Assessment Protocol Guidelines. 4. The RCP will confer with the physician concerning the care of the patient at any time questions or problems arise. 5. If during therapy, the patient exhibits no improvement or deterioration in clinical status the RCP will notify the physician and the patients nurse. VIII. Interventions:   A. The patients nurse is responsible concerning all items related to his/her care. Ongoing communication with nursing is essential to successful protocol management. B. The RCP recognizes the value of the team approach in meeting the patients needs.  Nursing input regarding the patients pulmonary condition will be sought as needed. IX. Reportable conditions: The RCP will inform the physician if:  1. There are acute changes in patients respiratory status. 2. The therapist is unable to determine appropriate care plan upon assessment. 3. The patient fails to reach therapeutic objective. 4. A change or additional medication is needed. X.  Patient Education:    A. Patient will receive instruction on the followin. The treatment modality, including objectives and proper technique of therapy  2. Respiratory medications  B. Documentation shall occur in the patient education section of the patients EMR. XI. Documentation: Record all findings as described above in the patients EMR. Related Protocols: A. Aerosolized Medication Protocol  B. Bronchial Hygiene  C. Oxygen Protocol   D. Volume Expansion/Secretion Clearance  E. Ventilator Weaning Protocols    References:  N   Joint Commission Consolidated Daniel Standard   L    Respiratory Care Department Policy, Procedure and Protocol Guideline Manual, , JODI Dietrich. L  Therapist Driven Respiratory Care Protocols  A Practitioners Guide for Criteria-Based Respiratory Care by Vanessa Townsend M.D., and JODI Sandoval, RAMOS. L  The rationale for therapist-driven protocols: an update. Respiratory Care 1998; [facility] Guidelines.

## 2017-12-26 NOTE — ROUTINE PROCESS
Respiratory Care Services     Policy Number: [id number]    Title: Aerosolized Medication Protocol    Effective Date: 10/1998    Revised Date: 06/2013, 03/2016    Reviewed Date: 05/2014/ 03/2015 , 06/2017       I. Policy: The Aerosolized Medication Protocol shall by implemented by Respiratory Care              Practitioners (RCP) for patients with orders to receive aerosol therapy with medication. II. Purpose: To open and maintain obstructed airways, the RCP, will utilize the following   protocol to select the indicated aerosolized medication(s) and determine the most effective method of delivery to the patient. III. Patient Type: All patients who are determined to meet aerosolized medication criteria as          outlined in this protocol. IV. Responsibility: Director, [address], registered Respiratory Care                                                     Practitioners (RCPs) with documented competency in the performance of                                     respiratory therapeutic techniques. V. Equipment needed:  A. Stethoscope  B. Pulse oximeter  C. IPPB machine and circuit  D. Aerosol nebulizer  E. MDI Inhaler     VI. Protocol:   A. The following conditions are accepted indications for aerosolized medication therapy. 1. Bronchospasm/wheezing  2. Impaired mucociliary clearance  3. Tracheobronchial mucosal congestion/and laryngeal stridor  4. Diseases which commonly require aerosolized medication therapy include, but are not limited to:  a. Asthma/reactive airway disease  b. Bronchitis/emphysema (COPD)  c. Cystic fibrosis  d. Severe laryngitis/tracheitis  e. Bronchiectasis  f. Smoke inhalation or chemical trauma to the lung or upper airway  g. Physical trauma to the upper airway  h. Laryngotracheobronchitis  i. Bronchiolitis  j. Non-specific wheezing              B. Indications for bronchodilator medications will include:  a.  Bronchospasm/ wheezing  b. Asthma/reactive airway disease  c. Chronic obstructive pulmonary disease  d. Obstructive defect on pulmonary function testing  C. Administration of medications  1. If a bronchodilator or any other type of respiratory medication is needed, a physician order must be indicated in the medication section in the patients EMR. 2. When the physician specifies the medication and dosage at the time of request, the ordered medication will be used as part of the care plan. D. The following guidelines will be utilized in the evaluation and selection of the         appropriate delivery device for indicated medication(s):  1. IPPB  a. Ventilation is inadequate (rapid shallow breathing)  b. Refractory atelectasis has developed, other forms of therapy are unsuccessful  c. Inability to clear secretions  d. Need to improve lung expansion  2. Unassisted aerosol (UA) is the preferred method of aerosol delivery and indicated if  a. Ventilation is inadequate  b. Patient demonstrates wheezing   c. patient is unable to perform MDI effectively   d. Patient preference  3. Metered Dose Inhaler (MDI)   a. Patient is alert/cooperative  b. Medication(s) available in this delivery method. c. Able to perform 3 second breath hold. d. Patient has demonstrated ability to use MDI effectively  e. Patient has used MDI therapy previously, either at home or in the hospital.  f. Note: The only approved inhalers on formulary are albuterol and Spiriva. VII. Guidelines:   Monitor patients vital signs and evaluate patients clinical status. The need to change medication and/or modality may be indicated by:  1. A pulse greater than 120 bpm, or if a pulse increase of 20 bpm occurs with bronchodilator medications. 2. Significant worsening of dyspnea or wheezing occurring during or within 30 minutes of discontinuing therapy.   3. Worsening of patients sensorium (e.g. patient becomes confused or obtunded, and unable to follow directions). 4. Worsening of patients chest x-ray. 5. Change in sputum (e.g. increased pulmonary infiltrate, which might indicate need for volume expansion therapy). 6. Patient has difficulty coughing up secretions, which might indicate need for acetylcysteine and/or bronchial hygiene therapy. 7. Call physician immediately if dyspnea worsens and is not responsive to modifications allowed by protocol. VIII. Clinical Responsibility:  A. The therapy assessment guidelines will be used to evaluate all patients receiving aerosolized medications with the exception of critical care areas. 1. RCPs will perform changes in therapy per protocol. 2. It will be the responsibility of RCP to provide instruction regarding respiratory medications, aerosol therapy and proper MDI technique, as well as, spacer usage to patients ordered MDI therapy. 3. Current therapy that is part of a patients home regimen will not be discontinued. IX. Documentation  A. Document assessment findings in the respiratory assessment section of the patients EMR. B. Document changes in therapy per protocol in the respiratory orders section and in the care plan section of the patients EMR. C. Document patient education in the patient education section of the patients EMR. X. Outcome Criteria:  A. Relief of wheezes and obstruction  B. Improved cough and sputum color and consistency  C. Improved chest x-ray  D. Improved arterial oxygen tension and or SaO2  E. Improved Peak Flow on asthmatic patients        XI. Related Protocols:  A. Respiratory Patient Care Protocols  B. Bronchial Hygiene Therapy  C.  Oxygen Protocol    Reference:

## 2017-12-26 NOTE — PROGRESS NOTES
Problem: Mobility Impaired (Adult and Pediatric)  Goal: *Acute Goals and Plan of Care (Insert Text)  STG:  (1.)Ms. Radha Sheppard will move from supine to sit and sit to supine  with MINIMAL ASSIST within 4-7 day(s). (2.)Ms. Radha Sheppard will transfer from bed to chair and chair to bed with MODERATE ASSIST using the least restrictive device within 4-7 day(s). (3.)Ms. Radha Sheppard will ambulate with MODERATE ASSIST of 2 for 20 feet with the least restrictive device within 4-7 day(s). LTG:  (1.)Ms. Radha Sheppard will move from supine to sit and sit to supine  in bed with CONTACT GUARD ASSIST within 7-14 day(s). (2.)Ms. Radha Sheppard will transfer from bed to chair and chair to bed with MINIMAL ASSIST using the least restrictive device within 7-14 day(s). (3.)Ms. Radha Sheppard will ambulate with MINIMAL ASSIST of 2 for 50 feet with the least restrictive device within 7-14 day(s). ________________________________________________________________________________________________      PHYSICAL THERAPY: Initial Assessment, Treatment Day: Day of Assessment, AM 12/26/2017  INPATIENT: Hospital Day: 4  Payor: [employer] MEDICARE COMPLETE / Plan: [address] / Product Type: Managed Care Medicare /      NAME/AGE/GENDER: Katiana Schneider is a 80 y.o. female   PRIMARY DIAGNOSIS: Sepsis ([facility]  Atrial fibrillation ([facility]  Bronchitis <principal problem not specified> <principal problem not specified>        ICD-10: Treatment Diagnosis:   · Generalized Muscle Weakness (M62.81)  · Other lack of cordination (R27.8)  · Difficulty in walking, Not elsewhere classified (R26.2)   Precaution/Allergies:  Methotrexate and Sulfa (sulfonamide antibiotics)      ASSESSMENT:     Ms. Radha Sheppard presents supine on contact and agreeable to therapy evaluation. Worked on sitting up on the edge of the bed with moderate to maximum assist and then pt reporting she needed to use the bathroom. She agreed to try and use the [facility].  Stood with moderate assist of 2 and was able to take steps just to [facility]. Decreased coordination of her lower extremities, weakness and decreased balance. Pt was able to void and have a bowel movement. She wanted to sit up in the chair, helped her take a few steps with walker this time to chair and set her up for breakfast with needs in reach. This pt was living at home alone with hired caregivers coming 7 days a week twice a day. They helped her with meals and her ADLs. On assessment she is needing much more assistance at this time than prior to admission. She will benefit from skilled PT interventions to maximize independence with functional mobility and strengthening. This section established at most recent assessment   PROBLEM LIST (Impairments causing functional limitations):  1. Decreased Strength  2. Decreased ADL/Functional Activities  3. Decreased Transfer Abilities  4. Decreased Ambulation Ability/Technique  5. Decreased Balance  6. Decreased Activity Tolerance   INTERVENTIONS PLANNED: (Benefits and precautions of physical therapy have been discussed with the patient.)  1. Balance Exercise  2. Bed Mobility  3. Gait Training  4. Therapeutic Activites  5. Therapeutic Exercise/Strengthening  6. Transfer Training     TREATMENT PLAN: Frequency/Duration: daily for duration of hospital stay  Rehabilitation Potential For Stated Goals: Good     RECOMMENDED REHABILITATION/EQUIPMENT: (at time of discharge pending progress): Due to the probability of continued deficits (see above) this patient will likely need continued skilled physical therapy after discharge.   Equipment:    to be determined              HISTORY:   History of Present Injury/Illness (Reason for Referral):  PER MD NOTE: Morro Majano is a 80 y.o. female who has a PMH of HTN, CKD stage III, dementia, hypothyroidism, and abnormal heart beat ( no diagnosis of atrial fibrillation yet ) who was brought in today from her [facility] apartment ( she lives alone ) after she was noted with increased sob on exertion, generalized weakness, bilateral pedal edema and confusion from her baseline dementia. History was obtained from her caregiver Ms Kary Ohara and Her son Mr Josephine Moraes. She has been complaining of these for several days. Has had on and off productive cough since 12/15/17 when she visited her PCP and was diagnosed with acute bronchitis treated with augmentin and prednisone. Of note, back then her PCP referred her to a pulmonologist and cardiologist after sob and findings compatible for chronic lung disease with bibasilar honeycombing. Denies nausea, vomiting, fever, chills, syncope, chest pain, abdominal pain, diarrhea, melena nor GI bleeding episodes.      Upon arrival to ER: VS bp 171/83  Hr 112  rr 20  t98f  O2: 94%   She was found alert, in no distress, able to speak in full sentences. Pertinent labs included wbc 19k, lactic acid of 2.6, bnp 834, cxr compatible with pulmonary edema and cardiomegaly. ekg showed a fib with rvr. She received 1 dose of 40mg iv lasix, 1 liter ns, levoquin 750mg, prednisone po. She had a negative UA, flu test and procalcitonin < 0.1  Past Medical History/Comorbidities:   Ms. Manuel Kidd  has a past medical history of Arthritis; Cancer ([address] Exertional dyspnea; HBP (high blood pressure); Hypothyroid; PE (pulmonary embolism) (); and TB (pulmonary tuberculosis) (1930). She also has no past medical history of Adverse effect of anesthesia; Difficult intubation; Hematuria, microscopic; Kidney stone; Malignant hyperthermia due to anesthesia; Nausea & vomiting; or Pseudocholinesterase deficiency. Ms. Manuel Kidd  has a past surgical history that includes hx appendectomy (1940); hx tonsil and adenoidectomy (193); hx hip replacement (Left, 2007); hx  section; hx carpal tunnel release (Bilateral); and hx back surgery (2016).   Social History/Living Environment:   Home Environment: Private residence  One/Two Story Residence: One story  Living Alone: Yes  Support Systems: Family member(s), Other (comments) (comfort keepers-aides)  Patient Expects to be Discharged to[de-identified] Rehabilitation facility  Current DME Used/Available at Home: Shower chair, Walker  Tub or Shower Type: Tub/Shower combination  Prior Level of Function/Work/Activity:  Pt living at home, using a walker in the home for short household distances. Hired caregivers that come 7 days a week to assist with meals and bathing and dressing. Number of Personal Factors/Comorbidities that affect the Plan of Care: 1-2: MODERATE COMPLEXITY   EXAMINATION:   Most Recent Physical Functioning:   Gross Assessment:  AROM: Generally decreased, functional  Strength: Generally decreased, functional  Coordination: Generally decreased, functional               Posture:  Posture (WDL): Exceptions to WDL  Posture Assessment: Forward head, Rounded shoulders, Trunk flexion  Balance:  Sitting: Intact; High guard  Standing: Impaired  Standing - Static: Fair  Standing - Dynamic : Fair (fair(-)) Bed Mobility:  Supine to Sit: Moderate assistance;Maximum assistance  Sit to Supine:  (stayed up in chair)  Wheelchair Mobility:     Transfers:  Sit to Stand: Moderate assistance;Assist x2  Stand to Sit: Moderate assistance;Assist x2  Gait:     Base of Support: Narrowed  Speed/Bessy: Shuffled; Slow  Step Length: Left shortened;Right shortened  Gait Abnormalities: Decreased step clearance;Shuffling gait; Step to gait;Trunk sway increased  Distance (ft):  (few steps from bed to [facility] and [facility] to chair)  Assistive Device: Walker, rolling  Ambulation - Level of Assistance: Moderate assistance;Assist x2      Body Structures Involved:  1. Muscles Body Functions Affected:  1. Movement Related Activities and Participation Affected:  1. Mobility  2.  Self Care   Number of elements that affect the Plan of Care: 4+: HIGH COMPLEXITY   CLINICAL PRESENTATION:   Presentation: Evolving clinical presentation with changing clinical characteristics: MODERATE COMPLEXITY   CLINICAL DECISION MAKIN[address] [place] [id number] AM-PAC 6 Clicks   Basic Mobility Inpatient Short Form  How much difficulty does the patient currently have. .. Unable A Lot A Little None   1. Turning over in bed (including adjusting bedclothes, sheets and blankets)? [] 1   [x] 2   [] 3   [] 4   2. Sitting down on and standing up from a chair with arms ( e.g., wheelchair, bedside commode, etc.)   [] 1   [x] 2   [] 3   [] 4   3. Moving from lying on back to sitting on the side of the bed? [] 1   [x] 2   [] 3   [] 4   How much help from another person does the patient currently need. .. Total A Lot A Little None   4. Moving to and from a bed to a chair (including a wheelchair)? [] 1   [x] 2   [] 3   [] 4   5. Need to walk in hospital room? [] 1   [x] 2   [] 3   [] 4   6. Climbing 3-5 steps with a railing? [x] 1   [] 2   [] 3   [] 4   © 2007, Trustees of [address] [place] [address] [address] [address], under license to [employer]. All rights reserved      Score:  Initial: 11 Most Recent: X (Date: -- )    Interpretation of Tool:  Represents activities that are increasingly more difficult (i.e. Bed mobility, Transfers, Gait). Score 24 23 22-20 19-15 14-10 9-7 6     Modifier CH CI CJ CK CL CM CN      ? Mobility - Walking and Moving Around:     - CURRENT STATUS: CL - 60%-79% impaired, limited or restricted    - GOAL STATUS: CK - 40%-59% impaired, limited or restricted    - D/C STATUS:  ---------------To be determined---------------  Payor: [employer] MEDICARE COMPLETE / Plan: [address] / Product Type: Managed Care Medicare /      Medical Necessity:     · Patient is expected to demonstrate progress in strength, balance, coordination and functional technique to decrease assistance required with functional mobility. Reason for Services/Other Comments:  · Patient continues to require skilled intervention due to decreased independence with functional mobility and strengthening.    Use of outcome tool(s) and clinical judgement create a POC that gives a: Questionable prediction of patient's progress: MODERATE COMPLEXITY            TREATMENT:   (In addition to Assessment/Re-Assessment sessions the following treatments were rendered)   Pre-treatment Symptoms/Complaints:  none  Pain: Initial:   Pain Intensity 1: 0  Post Session:  No complaints of pain     Assessment/Reassessment only, no treatment provided today    Braces/Orthotics/Lines/Etc:   · IV  · O2 Device: Room air  Treatment/Session Assessment:    · Response to Treatment:  Appeared to tolerate OK, anticipate progress with be slow  · Interdisciplinary Collaboration:   o Occupational Therapist  o Registered Nurse  o Rehabilitation Attendant  · After treatment position/precautions:   o Up in chair  o Bed alarm/tab alert on  o Bed/Chair-wheels locked  o Call light within reach  o RN notified   · Compliance with Program/Exercises: Will assess as treatment progresses. · Recommendations/Intent for next treatment session: \"Next visit will focus on advancements to more challenging activities\".   Total Treatment Duration:  PT Patient Time In/Time Out  Time In: 1015  Time Out: [address], [address] [facility]

## 2017-12-26 NOTE — PROGRESS NOTES
SW speaks with son who has made decision for pt to return home with 24/7 caregivers. Per MD pt will need  PT.  SW offered son choices of [facility] and he prefers [facility]. SW made referral to Bryant Velasquez with [facility]. Pt would also benefit from W/C. SW made referral to [facility] faxed clinical and orders for w/c.                  SW received call back from pt's son who will look into increasing pt's in-home caregivers to 24/7 for next several weeks along with [facility] PT.  Pt's son also request SW look into STR at a SNF. Pt has [address] [employer] Complete insurance. ERAN sent referral to [facility] and Azra Breen via CC link. ERAN called son Jimmy Gardner [phone] ) to discuss d/c plan, left VM message.

## 2017-12-26 NOTE — WOUND CARE
Patient seen for left heel blister, clear broken blister noted with pink viable base. Foam dressing in use but small and saturated. Increased size of dressing and frequency. Ordered Prevalon boots for use when in the bed. Updated patient and primary nurse. Re-consult wound team if needed.

## 2017-12-26 NOTE — PROGRESS NOTES
[address]  Face to Face Encounter    Patients Name: Sandor Turner    YOB: 1924    Ordering Physician:  Padmini Beck     Primary Diagnosis: Sepsis [facility])  Atrial fibrillation [facility])  Bronchitis    Date of Face to Face:   12/26/2017                                  Face to Face Encounter findings are related to primary reason for home care:   yes. 1. I certify that the patient needs intermittent care as follows: physical therapy: strengthening and gait/stair training    2. I certify that this patient is homebound, that is: 1) patient requires the use of a walker device, special transportation, or assistance of another to leave the home; or 2) patient's condition makes leaving the home medically contraindicated; and 3) patient has a normal inability to leave the home and leaving the home requires considerable and taxing effort. Patient may leave the home for infrequent and short duration for medical reasons, and occasional absences for non-medical reasons. Homebound status is due to the following functional limitations: Patient with strength deficits limiting the performance of all ADL's without caregiver assistance or the use of an assistive device. 3. I certify that this patient is under my care and that I, or a nurse practitioner or  [id number], or clinical nurse specialist, or certified nurse midwife, working with me, had a Face-to-Face Encounter that meets the physician Face-to-Face Encounter requirements. The following are the clinical findings from the [address] [place] [address] encounter that support the need for skilled services and is a summary of the encounter:  See Progress Notes     See attached progess note      Julien Riedel, LBSW  12/26/2017      THE FOLLOWING TO BE COMPLETED BY THE COMMUNITY PHYSICIAN:    I concur with the findings described above from the [facility] encounter that this patient is homebound and in need of a skilled service.     Certifying Physician: _____________________________________      Printed Certifying Physician Name: _____________________________________    Date: _________________

## 2017-12-26 NOTE — PROGRESS NOTES
Progress Note    Patient: Crystal Thapa MRN: [id number]  SSN: [id number]    YOB: 1924  Age: 80 y.o. Sex: female      Admit Date: 12/23/2017    LOS: 3 days     Subjective:   Patient examined at bedside. She had no overnight events. A fib is controlled. In spoke with her son today. I explained goals of care and possibility to send her to a STR once medically cleared. He stated he would prefer to take his mother back home and pay for private care givers instead of STR. He did requested our support to arrange home PT and a wheelchair. I involved care manager. ROS: Denies nausea, vomiting, chest pain, diarrhea. Objective:     Vitals:    12/25/17 2300 12/25/17 2346 12/26/17 0330 12/26/17 0647   BP: 132/67  121/69 122/70   Pulse: 94  92 91   Resp: 20  20 20   Temp: 97 °F (36.1 °C)  97.3 °F (36.3 °C) 98.1 °F (36.7 °C)   SpO2: 94%  92% 94%   Weight:  83 kg (183 lb)     Height:            Intake and Output:  Current Shift:    Last three shifts: 12/24 1901 - 12/26 0700  In: 400 [P.O.:150; I.V.:250]  Out: 2400 [Urine:2400]    Physical Exam:   GENERAL: alert, cooperative, no distress, appears stated age  EYE: negative  LYMPHATIC: Cervical, supraclavicular, and axillary nodes normal.   THROAT & NECK: normal and no erythema or exudates noted. LUNG: no wheezing, no rales   HEART: irregular rate and rhythm, no murmur, click, rub or gallop  ABDOMEN: soft, non-tender. Bowel sounds normal. No masses,  no organomegaly  EXTREMITIES:  Extremities atraumatic, no cyanosis, but she has bilateral pedal edema, much improved from yesterday   SKIN: Normal.  NEUROLOGIC: negative  PSYCHIATRIC: non focal    Lab/Data Review: All lab results for the last 24 hours reviewed.      Assessment:     Active Problems:    Leukocytosis (11/12/2014)      Atrial fibrillation ([address] (12/23/2017)      Bronchitis (12/23/2017)      Sepsis ([address] (12/23/2017)      CHF (congestive heart failure) ([facility] (12/23/2017)      CKD (chronic kidney disease) (12/23/2017)      Elevated troponin (12/23/2017)      Pulmonary fibrosis ([facility] (12/25/2017)        Plan:     1. Sepsis due to possible likely acute bronchitis on top of pulmonary fibrosis noted on chest CT: Stop rocephin and continue zithromax to complete 5 days- on duonebs-     2. Pulmonary edema: as per ECHO findings she has moderate MR,AR and TR, her EF is 65%: change lasix to po- refer to cardiology as outpatient     3. New onset atrial fibrillation with RVR: [id number] score: 5: controlled now-  on aspirin only as per family requests ( no candidate for [facility] due to risk for falls )- on lopressor and cardizem     4. Hypomagnesemia, replaced with 2gr iv     5. Non thrombotic troponin elevation, probable to atrial fibrillation- demand: stable trop levels    6. Elevated lfts, possible hepatic congestion due to problem 2 and 3:  US showed pancreatic duct dilatation compatible with chronic pancreatitis, cholelithiasis: LFTs improved-  monitor-    7. CKD stage III: stable, avoid nephrotoxic meds    Dementia: stable     -DVT ppx: heparin sq     Code status: DNR      **emergency contact: [facility] ): Mr Burton Dk: [phone]    Discharge plan: discharge tomorrow back to her apartment with home PT       Signed By: Kelin Huerta MD     December 26, 2017

## 2017-12-26 NOTE — PROGRESS NOTES
Pt resting in bed and is alert and oriented x 2. She denies pain and is on room air. RR even and unlabored. Call light in reach and pt instructed to call for assistance if needed. Will monitor. Bed alarm on.

## 2017-12-27 NOTE — DISCHARGE INSTRUCTIONS
DISCHARGE SUMMARY from Nurse    The following personal items are in your possession at time of discharge:                   Clothing: At bedside                PATIENT INSTRUCTIONS:    After general anesthesia or intravenous sedation, for 24 hours or while taking prescription Narcotics:  · Limit your activities  · Do not drive and operate hazardous machinery  · Do not make important personal or business decisions  · Do  not drink alcoholic beverages  · If you have not urinated within 8 hours after discharge, please contact your surgeon on call. Report the following to your surgeon:  · Excessive pain, swelling, redness or odor of or around the surgical area  · Temperature over 100.5  · Nausea and vomiting lasting longer than 4 hours or if unable to take medications  · Any signs of decreased circulation or nerve impairment to extremity: change in color, persistent  numbness, tingling, coldness or increase pain  · Any questions        What to do at Home:  Recommended activity: Activity as tolerated, per MD    If you experience any of the following symptoms fever>101, pain unrelieved with medication, nausea/vomiting, shortness of breath, dizziness/fainting, chest pain. , please follow up with your doctor. *  Please give a list of your current medications to your Primary Care Provider. *  Please update this list whenever your medications are discontinued, doses are      changed, or new medications (including over-the-counter products) are added. *  Please carry medication information at all times in case of emergency situations. These are general instructions for a healthy lifestyle:    No smoking/ No tobacco products/ Avoid exposure to second hand smoke    Surgeon General's Warning:  Quitting smoking now greatly reduces serious risk to your health.     Obesity, smoking, and sedentary lifestyle greatly increases your risk for illness    A healthy diet, regular physical exercise & weight monitoring are important for maintaining a healthy lifestyle    You may be retaining fluid if you have a history of heart failure or if you experience any of the following symptoms:  Weight gain of 3 pounds or more overnight or 5 pounds in a week, increased swelling in our hands or feet or shortness of breath while lying flat in bed. Please call your doctor as soon as you notice any of these symptoms; do not wait until your next office visit. Recognize signs and symptoms of STROKE:    F-face looks uneven    A-arms unable to move or move unevenly    S-speech slurred or non-existent    T-time-call 911 as soon as signs and symptoms begin-DO NOT go       Back to bed or wait to see if you get better-TIME IS BRAIN. Warning Signs of HEART ATTACK     Call 911 if you have these symptoms:   Chest discomfort. Most heart attacks involve discomfort in the center of the chest that lasts more than a few minutes, or that goes away and comes back. It can feel like uncomfortable pressure, squeezing, fullness, or pain.  Discomfort in other areas of the upper body. Symptoms can include pain or discomfort in one or both arms, the back, neck, jaw, or stomach.  Shortness of breath with or without chest discomfort.  Other signs may include breaking out in a cold sweat, nausea, or lightheadedness. Don't wait more than five minutes to call 911 - MINUTES MATTER! Fast action can save your life. Calling 911 is almost always the fastest way to get lifesaving treatment. Emergency Medical Services staff can begin treatment when they arrive -- up to an hour sooner than if someone gets to the hospital by car. The discharge information has been reviewed with the {PATIENT PARENT GUARDIAN:27714}. The {PATIENT PARENT GUARDIAN:57126} verbalized understanding. Discharge medications reviewed with the {[employer] meds reviewed PLSX:65222} and appropriate educational materials and side effects teaching were provided.                Atrial Fibrillation: Care Instructions  Your Care Instructions    Atrial fibrillation is an irregular and often fast heartbeat. Treating this condition is important for several reasons. It can cause blood clots, which can travel from your heart to your brain and cause a stroke. If you have a fast heartbeat, you may feel lightheaded, dizzy, and weak. An irregular heartbeat can also increase your risk for heart failure. Atrial fibrillation is often the result of another heart condition, such as high blood pressure or coronary artery disease. Making changes to improve your heart condition will help you stay healthy and active. Follow-up care is a key part of your treatment and safety. Be sure to make and go to all appointments, and call your doctor if you are having problems. It's also a good idea to know your test results and keep a list of the medicines you take. How can you care for yourself at home? Medicines  ? · Take your medicines exactly as prescribed. Call your doctor if you think you are having a problem with your medicine. You will get more details on the specific medicines your doctor prescribes. ? · If your doctor has given you a blood thinner to prevent a stroke, be sure you get instructions about how to take your medicine safely. Blood thinners can cause serious bleeding problems. ? · Do not take any vitamins, over-the-counter drugs, or herbal products without talking to your doctor first.   ? Lifestyle changes  ? · Do not smoke. Smoking can increase your chance of a stroke and heart attack. If you need help quitting, talk to your doctor about stop-smoking programs and medicines. These can increase your chances of quitting for good. ? · Eat a heart-healthy diet. ? · Stay at a healthy weight. Lose weight if you need to.   ? · Limit alcohol to 2 drinks a day for men and 1 drink a day for women. Too much alcohol can cause health problems. ? · Avoid colds and flu. Get a pneumococcal vaccine shot.  If you have had one before, ask your doctor whether you need another dose. Get a flu shot every year. If you must be around people with colds or flu, wash your hands often. Activity  ? · If your doctor recommends it, get more exercise. Walking is a good choice. Bit by bit, increase the amount you walk every day. Try for at least 30 minutes on most days of the week. You also may want to swim, bike, or do other activities. Your doctor may suggest that you join a cardiac rehabilitation program so that you can have help increasing your physical activity safely. ? · Start light exercise if your doctor says it is okay. Even a small amount will help you get stronger, have more energy, and manage stress. Walking is an easy way to get exercise. Start out by walking a little more than you did in the hospital. Gradually increase the amount you walk. ? · When you exercise, watch for signs that your heart is working too hard. You are pushing too hard if you cannot talk while you are exercising. If you become short of breath or dizzy or have chest pain, sit down and rest immediately. ? · Check your pulse regularly. Place two fingers on the artery at the palm side of your wrist, in line with your thumb. If your heartbeat seems uneven or fast, talk to your doctor. When should you call for help? Call 911 anytime you think you may need emergency care. For example, call if:  ? · You have symptoms of a heart attack. These may include:  ¨ Chest pain or pressure, or a strange feeling in the chest.  ¨ Sweating. ¨ Shortness of breath. ¨ Nausea or vomiting. ¨ Pain, pressure, or a strange feeling in the back, neck, jaw, or upper belly or in one or both shoulders or arms. ¨ Lightheadedness or sudden weakness. ¨ A fast or irregular heartbeat. After you call 911, the  may tell you to chew 1 adult-strength or 2 to 4 low-dose aspirin. Wait for an ambulance. Do not try to drive yourself. ? · You have symptoms of a stroke. These may include:  ¨ Sudden numbness, tingling, weakness, or loss of movement in your face, arm, or leg, especially on only one side of your body. ¨ Sudden vision changes. ¨ Sudden trouble speaking. ¨ Sudden confusion or trouble understanding simple statements. ¨ Sudden problems with walking or balance. ¨ A sudden, severe headache that is different from past headaches. ? · You passed out (lost consciousness). ?Call your doctor now or seek immediate medical care if:  ? · You have new or increased shortness of breath. ? · You feel dizzy or lightheaded, or you feel like you may faint. ? · Your heart rate becomes irregular. ? · You can feel your heart flutter in your chest or skip heartbeats. Tell your doctor if these symptoms are new or worse. ? Watch closely for changes in your health, and be sure to contact your doctor if you have any problems. Where can you learn more? Go to http://aleksandr-sumaya.info/. Enter U020 in the search box to learn more about \"Atrial Fibrillation: Care Instructions. \"  Current as of: September 21, 2016  Content Version: 11.4  © 8069-1637 [employer]. Care instructions adapted under license by [employer] (which disclaims liability or warranty for this information). If you have questions about a medical condition or this instruction, always ask your healthcare professional. [place] [address] any warranty or liability for your use of this information.

## 2017-12-27 NOTE — PROGRESS NOTES
Discharge instructions and prescriptions provided and explained to the pt. Med side effect sheet reviewed. Opportunity for questions provided. Pt is waiting on transport. Instructed to call once ready to leave.

## 2017-12-27 NOTE — PROGRESS NOTES
Per MD pt stable for d/c.  ERAN spoke with pt's son who agreed for ambulance transport home. Pt's caregiver Elizabeth Bowers ) at bedside. ERAN made referral to [facility] for w/c to be delivered to pt's home, clinical & orders faxed. Son was provided phone # for [facility]. ERAN arranged transport for 12:30.

## 2017-12-27 NOTE — DISCHARGE SUMMARY
Discharge Summary     Patient: Dania Kowalski MRN: [id number]  SSN: [id number]    YOB: 1924  Age: 80 y.o.   Sex: female       Admit Date: 12/23/2017    Discharge Date: 12/27/2017      Admission Diagnoses: Sepsis ([facility].)  Atrial fibrillation ([facility].)  Bronchitis    Discharge Diagnoses:   Problem List as of 12/27/2017  Date Reviewed: 12/25/2017          Codes Class Noted - Resolved    Pulmonary fibrosis ([facility].) ICD-10-CM: J84.10  ICD-9-CM: 269  12/25/2017 - Present        Atrial fibrillation ([place] [address].) ICD-10-CM: I48.91  ICD-9-CM: 427.31  12/23/2017 - Present        Bronchitis ICD-10-CM: J40  ICD-9-CM: 490  12/23/2017 - Present        Sepsis ([facility].) ICD-10-CM: A41.9  ICD-9-CM: 038.9, 995.91  12/23/2017 - Present        CHF (congestive heart failure) ([facility].) ICD-10-CM: I50.9  ICD-9-CM: 428.0  12/23/2017 - Present        CKD (chronic kidney disease) ICD-10-CM: N18.9  ICD-9-CM: 585.9  12/23/2017 - Present        Elevated troponin ICD-10-CM: R74.8  ICD-9-CM: 790.6  12/23/2017 - Present        Mild persistent asthma without complication [facility]-EF: K02.05  ICD-9-CM: 493.90  2/16/2017 - Present        Urge incontinence ICD-10-CM: N39.41  ICD-9-CM: 788.31  10/28/2016 - Present        Nocturnal enuresis ICD-10-CM: N39.44  ICD-9-CM: 788.36  10/28/2016 - Present        IPMN (intraductal papillary mucinous neoplasm) ICD-10-CM: D49.0  ICD-9-CM: 239.0  12/12/2014 - Present        Rheumatoid arthritis ([facility].) ICD-10-CM: M06.9  ICD-9-CM: 714.0  11/13/2014 - Present        Leukocytosis ICD-10-CM: D72.829  ICD-9-CM: 288.60  11/12/2014 - Present               Discharge Condition: [place] Course:   Ms. Dania Kowalski is a 80 y.o. female who has a PMH of HTN, CKD stage III, dementia, pulmonary fibrosis, hypothyroidism, and abnormal heart beat ( no diagnosis of atrial fibrillation yet ) who was admitted on 12/23/17 with a diagnosis of sepsis due to bronchitis in the setting of underlying pulmonary fibrosis, new onset atrial fibrillation on RVR ( chads-vac score 5 ), pulmonary edema and pedal edema compatible with CHF. She was admitted under ICU. Of note, her PCP referred her to a pulmonologist and cardiologist after sob and findings compatible for chronic lung disease with bibasilar honeycombing on Sept/2017. Upon arrival VS bp 171/83  Hr 112  rr 20  t98f  O2: 94%. Pertinent labs included wbc 19k, lactic acid of 2.6, bnp 834, troponin 0.09, cxr compatible with pulmonary edema and cardiomegaly. ekg showed a fib with rvr. She received 1 dose of 40mg iv lasix, 1 liter ns, levoquin 750mg, prednisone po. She had a negative UA, flu test and procalcitonin < 0.1. She was started on rocephin and zithromax. As per family request ( her son ) he expressed no wishes to put patient under chronic oral anticoagulation and chose aspirin instead. Advance directives: DNR. Her hospital course was uneventful, Her arrhythmia was controlled on cardizem and lopressor po. Troponin elevation was the result of demand ischemia likely due to a fib and CHF. Blood cultures were negative. ECHO showed LV EF 55-60%, left atrium moderate dilation, AV mild-moderate R, Moderate MVR-, Moderate TVR. Her clinical status improved and she was transferred to medicine floors to continue PT/OT services. PT recommended STR, but according to patient's son decision, he would like his mother to be [facility] with [facility]. He will self-pay care givers to continue Ms Mckinney How recovery. **She has appointments already with: pulmonologist as outpatient for her fibrotic lung disease     Physical exam:  VS: checked /56  HR 96  RR 18  T: 985  RR 18  O2: 91 % room air.    Alert, able to speak in full sentences  Heent: perrla, no exudates  Neck: supple  Cardiac: irregular, no murmurs, no gallops  Lungs: clear, no rales, no wheezing  Abdomen: soft, non tender, no masses, no organomegalies  Extremities: no edema, atraumatic, no cyanosis  Vascular: pulses present  Neurology: cranial nerves grossly normal, no focal deficits. Consults: None    Significant Diagnostic Studies: SEE DC SUMMARY     Disposition: home    Discharge Medications:   Current Discharge Medication List      START taking these medications    Details   acetaminophen (TYLENOL) 325 mg tablet Take 2 Tabs by mouth every six (6) hours as needed. Qty: 20 Tab, Refills: 0      aspirin delayed-release 81 mg tablet Take 1 Tab by mouth daily. Qty: 30 Tab, Refills: 0      azithromycin 500 mg 500 mg IVPB 500 mg by IntraVENous route every twenty-four (24) hours. Qty: 3 Dose, Refills: 0      dilTIAZem CD (CARDIZEM CD) 120 mg ER capsule Take 1 Cap by mouth daily. Qty: 30 Cap, Refills: 0      magnesium oxide (MAG-OX) 400 mg tablet Take 1 Tab by mouth two (2) times a day. Qty: 60 Tab, Refills: 0      metoprolol tartrate (LOPRESSOR) 50 mg tablet Take 1 Tab by mouth every twelve (12) hours. Qty: 60 Tab, Refills: 0         CONTINUE these medications which have CHANGED    Details   furosemide (LASIX) 40 mg tablet Take 1 Tab by mouth daily. Qty: 30 Tab, Refills: 0         CONTINUE these medications which have NOT CHANGED    Details   levothyroxine (SYNTHROID) 75 mcg tablet TAKE 1 TABLET BY MOUTH  DAILY BEFORE BREAKFAST  Qty: 90 Tab, Refills: 0      glucosamine sulfate (GLUCOSAMINE) 500 mg tablet Take 1 Tab by mouth two (2) times a day. Qty: 60 Tab, Refills: 0    Associated Diagnoses: Rheumatoid arthritis involving multiple sites, unspecified rheumatoid factor presence (HCC)      albuterol-ipratropium (DUO-NEB) 2.5 mg-0.5 mg/3 ml nebu USE 3ML(1 VIAL) VIA NEBULIZER EVERY 6 HOURS AS NEEDED  Qty: 1080 mL, Refills: 3    Comments: **Patient requests 90 days supply**  Associated Diagnoses: Wheezing      Nebulizer & Compressor machine 1 Each by Does Not Apply route every four (4) hours as needed.   Qty: 1 Each, Refills: 0      Nebulizer Accessories kit Uses directed  Qty: 1 Kit, Refills: 0      budesonide-formoterol (SYMBICORT) 160-4.5 mcg/actuation HFA inhaler Take 2 Puffs by inhalation two (2) times a day. Qty: 3 Inhaler, Refills: 3      mirabegron ER (MYRBETRIQ) 25 mg ER tablet Take 1 Tab by mouth daily. Qty: 90 Tab, Refills: 3    Associated Diagnoses: Urge incontinence         STOP taking these medications       amoxicillin-clavulanate (AUGMENTIN) 875-125 mg per tablet Comments:   Reason for Stopping:         predniSONE (DELTASONE) 20 mg tablet Comments:   Reason for Stopping:         atenolol (TENORMIN) 25 mg tablet Comments:   Reason for Stopping:         promethazine (PHENERGAN) 12.5 mg tablet Comments:   Reason for Stopping:         lisinopril (PRINIVIL, ZESTRIL) 10 mg tablet Comments:   Reason for Stopping:         potassium chloride (KLOR-CON) 10 mEq tablet Comments:   Reason for Stopping:               Activity: Activity as tolerated  Diet: Cardiac Diet  Wound Care: None needed    Follow-up Appointments   Procedures    FOLLOW UP VISIT Appointment in: One Week PCP     PCP     Standing Status:   Standing     Number of Occurrences:   1     Order Specific Question:   Appointment in     Answer:    One Week    FOLLOW UP VISIT Appointment in: Ten Days CARDIOLOGY     CARDIOLOGY     Standing Status:   Standing     Number of Occurrences:   1     Standing Expiration Date:   12/28/2017     Order Specific Question:   Appointment in     Answer:   Ten Days       Signed By: Zaynab Parks MD     December 27, 2017

## 2017-12-27 NOTE — PROGRESS NOTES
Pt resting in bed and is alert and oriented to self. She denies pain and is on room air. RR even and unlabored. Call light in reach and pt instructed to call for assistance if needed. Will monitor. Bed alarm on.

## 2017-12-27 NOTE — PROGRESS NOTES
Problem: Mobility Impaired (Adult and Pediatric)  Goal: *Acute Goals and Plan of Care (Insert Text)  STG:  (1.)Ms. Dionicio Cogan will move from supine to sit and sit to supine  with MINIMAL ASSIST within 4-7 day(s). (2.)Ms. Dionicio Cogan will transfer from bed to chair and chair to bed with MODERATE ASSIST using the least restrictive device within 4-7 day(s). (3.)Ms. Dionicio Cogan will ambulate with MODERATE ASSIST of 2 for 20 feet with the least restrictive device within 4-7 day(s). LTG:  (1.)Ms. Dionicio Cogan will move from supine to sit and sit to supine  in bed with CONTACT GUARD ASSIST within 7-14 day(s). (2.)Ms. Dionicio Cogan will transfer from bed to chair and chair to bed with MINIMAL ASSIST using the least restrictive device within 7-14 day(s). (3.)Ms. Dionicio Cogan will ambulate with MINIMAL ASSIST of 2 for 50 feet with the least restrictive device within 7-14 day(s). ________________________________________________________________________________________________      PHYSICAL THERAPY: Daily Note, Treatment Day: 1st, AM 12/27/2017  INPATIENT: Hospital Day: 5  Payor: Dayna Blanco / Plan: [address] / Product Type: Managed Care Medicare /      NAME/AGE/GENDER: Arnulfo Mcnulty is a 80 y.o. female   PRIMARY DIAGNOSIS: Sepsis ([facility] [address]  Atrial fibrillation ([facility] [address]  Bronchitis <principal problem not specified> <principal problem not specified>        ICD-10: Treatment Diagnosis:   · Generalized Muscle Weakness (M62.81)  · Other lack of cordination (R27.8)  · Difficulty in walking, Not elsewhere classified (R26.2)   Precaution/Allergies:  Methotrexate and Sulfa (sulfonamide antibiotics)      ASSESSMENT:     Ms. Dionicio Cogan is supine in bed on arrival.  She is agreeable to PT and is alert and orientated. Pt states she is not able to walk much. She transferred supine to sit with mod assist x2. Pt sat EOB for a few minutes. She then transferred over to chair with mod assist x2 for balance.   Pt left sitting up in chair with needs in reach and caregiver present. Pt is leaving today with 24 hours sitter care at home. This section established at most recent assessment   PROBLEM LIST (Impairments causing functional limitations):  1. Decreased Strength  2. Decreased ADL/Functional Activities  3. Decreased Transfer Abilities  4. Decreased Ambulation Ability/Technique  5. Decreased Balance  6. Decreased Activity Tolerance   INTERVENTIONS PLANNED: (Benefits and precautions of physical therapy have been discussed with the patient.)  1. Balance Exercise  2. Bed Mobility  3. Gait Training  4. Therapeutic Activites  5. Therapeutic Exercise/Strengthening  6. Transfer Training     TREATMENT PLAN: Frequency/Duration: daily for duration of hospital stay  Rehabilitation Potential For Stated Goals: Good     RECOMMENDED REHABILITATION/EQUIPMENT: (at time of discharge pending progress): Due to the probability of continued deficits (see above) this patient will likely need continued skilled physical therapy after discharge. Equipment:    to be determined              HISTORY:   History of Present Injury/Illness (Reason for Referral):  PER MD NOTE: Svetlana Kang is a 80 y.o. female who has a PMH of HTN, CKD stage III, dementia, hypothyroidism, and abnormal heart beat ( no diagnosis of atrial fibrillation yet ) who was brought in today from her [facility] apartment ( she lives alone ) after she was noted with increased sob on exertion, generalized weakness, bilateral pedal edema and confusion from her baseline dementia. History was obtained from her caregiver Ms Kary Ohara and Her son Mr Josephine Moraes. She has been complaining of these for several days. Has had on and off productive cough since 12/15/17 when she visited her PCP and was diagnosed with acute bronchitis treated with augmentin and prednisone.  Of note, back then her PCP referred her to a pulmonologist and cardiologist after sob and findings compatible for chronic lung disease with bibasilar honeycombing. Denies nausea, vomiting, fever, chills, syncope, chest pain, abdominal pain, diarrhea, melena nor GI bleeding episodes.      Upon arrival to ER: VS bp 171/83  Hr 112  rr 20  t98f  O2: 94%   She was found alert, in no distress, able to speak in full sentences. Pertinent labs included wbc 19k, lactic acid of 2.6, bnp 834, cxr compatible with pulmonary edema and cardiomegaly. ekg showed a fib with rvr. She received 1 dose of 40mg iv lasix, 1 liter ns, levoquin 750mg, prednisone po. She had a negative UA, flu test and procalcitonin < 0.1  Past Medical History/Comorbidities:   Ms. Rivas Guido  has a past medical history of Arthritis; Cancer ([address] Exertional dyspnea; HBP (high blood pressure); Hypothyroid; PE (pulmonary embolism) (); and TB (pulmonary tuberculosis) (1930). She also has no past medical history of Adverse effect of anesthesia; Difficult intubation; Hematuria, microscopic; Kidney stone; Malignant hyperthermia due to anesthesia; Nausea & vomiting; or Pseudocholinesterase deficiency. Ms. Rivas Guido  has a past surgical history that includes hx appendectomy (1940); hx tonsil and adenoidectomy (193); hx hip replacement (Left, ); hx  section; hx carpal tunnel release (Bilateral); and hx back surgery (2016). Social History/Living Environment:   Home Environment: Private residence  One/Two Story Residence: One story  Living Alone: Yes  Support Systems: Family member(s), Other (comments) (comfort keepers-aides)  Patient Expects to be Discharged to[de-identified] Rehabilitation facility  Current DME Used/Available at Home: Shower chair, Walker  Tub or Shower Type: Tub/Shower combination  Prior Level of Function/Work/Activity:  Pt living at home, using a walker in the home for short household distances. Hired caregivers that come 7 days a week to assist with meals and bathing and dressing.       Number of Personal Factors/Comorbidities that affect the Plan of Care: 1-2: MODERATE COMPLEXITY   EXAMINATION:   Most Recent Physical Functioning:   Gross Assessment:                  Posture:     Balance:    Bed Mobility:  Supine to Sit: Moderate assistance;Assist x2  Sit to Supine:  (pt left up in chair)  Wheelchair Mobility:     Transfers:  Sit to Stand: Moderate assistance;Assist x2  Stand to Sit: Moderate assistance;Assist x2  Gait:     Base of Support: Narrowed  Speed/Bessy: Shuffled; Slow  Step Length: Left shortened;Right shortened  Gait Abnormalities: Decreased step clearance  Distance (ft): 5 Feet (ft)  Assistive Device: Walker, rolling  Ambulation - Level of Assistance: Moderate assistance;Assist x2      Body Structures Involved:  1. Muscles Body Functions Affected:  1. Movement Related Activities and Participation Affected:  1. Mobility  2. Self Care   Number of elements that affect the Plan of Care: 4+: HIGH COMPLEXITY   CLINICAL PRESENTATION:   Presentation: Evolving clinical presentation with changing clinical characteristics: MODERATE COMPLEXITY   CLINICAL DECISION MAKIN [facility] Mobility Inpatient Short Form  How much difficulty does the patient currently have. .. Unable A Lot A Little None   1. Turning over in bed (including adjusting bedclothes, sheets and blankets)? [] 1   [x] 2   [] 3   [] 4   2. Sitting down on and standing up from a chair with arms ( e.g., wheelchair, bedside commode, etc.)   [] 1   [x] 2   [] 3   [] 4   3. Moving from lying on back to sitting on the side of the bed? [] 1   [x] 2   [] 3   [] 4   How much help from another person does the patient currently need. .. Total A Lot A Little None   4. Moving to and from a bed to a chair (including a wheelchair)? [] 1   [x] 2   [] 3   [] 4   5. Need to walk in hospital room? [] 1   [x] 2   [] 3   [] 4   6. Climbing 3-5 steps with a railing? [x] 1   [] 2   [] 3   [] 4   © , Trustees of [address] [place] [address] [address] [address], under license to [employer].  All rights reserved      Score:  Initial: 11 Most Recent: X (Date: -- )    Interpretation of Tool:  Represents activities that are increasingly more difficult (i.e. Bed mobility, Transfers, Gait). Score 24 23 22-20 19-15 14-10 9-7 6     Modifier CH CI CJ CK CL CM CN      ? Mobility - Walking and Moving Around:     - CURRENT STATUS: CL - 60%-79% impaired, limited or restricted    - GOAL STATUS: CK - 40%-59% impaired, limited or restricted    - D/C STATUS:  ---------------To be determined---------------  Payor: LASHAYP MEDICARE COMPLETE / Plan: [address] / Product Type: [employer] Medicare /      Medical Necessity:     · Patient is expected to demonstrate progress in strength, balance, coordination and functional technique to decrease assistance required with functional mobility. Reason for Services/Other Comments:  · Patient continues to require skilled intervention due to decreased independence with functional mobility and strengthening. Use of outcome tool(s) and clinical judgement create a POC that gives a: Questionable prediction of patient's progress: MODERATE COMPLEXITY            TREATMENT:   (In addition to Assessment/Re-Assessment sessions the following treatments were rendered)   Pre-treatment Symptoms/Complaints:  none  Pain: Initial:      Post Session:  No complaints of pain     Therapeutic Activity: (    23 minutes): Therapeutic activities including Chair transfers to improve mobility, strength and balance. Required minimal   to promote dynamic balance in standing.      Braces/Orthotics/Lines/Etc:   · O2 Device: Room air  Treatment/Session Assessment:    · Response to Treatment:  Appeared to tolerate OK, anticipate progress with be slow  · Interdisciplinary Collaboration:   o Registered Nurse  o Rehabilitation Attendant  · After treatment position/precautions:   o Up in chair  o Bed alarm/tab alert on  o Bed/Chair-wheels locked  o Call light within reach  o RN notified · Compliance with Program/Exercises: Will assess as treatment progresses. · Recommendations/Intent for next treatment session: \"Next visit will focus on advancements to more challenging activities\".   Total Treatment Duration:  PT Patient Time In/Time Out  Time In: 1040  Time Out: [address], \[facility]\""

## 2017-12-28 NOTE — TELEPHONE ENCOUNTER
I received a call from Thelma Fallon stating Jazmin said they did not have a prescription on file for the DuoNeb. Per the prescription record in CC. Dr. Kathi Augustine changed her nebulization from albuterol to duo-neb on 9/13/17 and [facility]. I recommended she call Jazmin on [address] back to verify what they had on file, but to continue the Albuterol until she can clarify with Dr. Kathi Augustine when his office reopens. She verified understanding.

## 2017-12-28 NOTE — PROGRESS NOTES
This note will not be viewable in [address] Transition of Care Discharge Follow-up Questionnaire     Date/Time of Call:     12/28/2017   1:45 pm     What was the patient seen in the hospital for? A. Fib     Does the patient understand his/her diagnosis and/or treatment and what happened during the hospitalization? Patients caregiver (CG), Sarika voiced understanding of diagnosis and /or treatment. Did the patient receive discharge instructions? Patients CG voiced understanding of diagnosis and /or treatment. Review any discharge instructions (see notes in [facility]). Ask patient if they have any questions? Care Coordinator and patients CG [facility]. Opportunity for questions and clarification provided. Patients CG verbalized understanding of instructions. Were home services ordered (nursing, PT, OT, ST, etc.)? Yes   [address] [facility]   If so, has the first visit occurred? If not, why? (Assist with coordination of services if necessary.)   yes   Was any DME ordered? No     If so, has it been received? If not, why?  (Assist with coordination of arranging DME orders if necessary.)   n/a   Complete a review of all medications (new, continued and discontinued meds per the D/C instructions and medication tab [facility] medication list reviewed, confirmed and are being taken as instructed upon discharge. CG reports also reviewing medications with [facility] Pharmacist.   Were all new prescriptions filled? If not, why?  (Assist with obtainment of medications if necessary.)   yes     Does the patient understand the purpose and dosing instructions for all medications? (If patient has questions, provide explanation and education.)   Patients CG voiced understanding. Does the patient have any problems in performing ADLs? (If patient is unable to perform ADLs  what is the limiting factor(s)?   Do they have a support system that can assist? If no support system is present, discuss possible assistance that they may be able to obtain.)   Patients CG reports patient needing assistance with ADLs. CG reports [facility] PT seeing patient today and order for [facility] was placed. CG and family are available to help if/when needed. Does the patient have all follow-up appointments scheduled? 7 day f/up with PCP?    7-14 day f/up with specialist?    If f/up has not been made  what actions has the care coordinator made to accomplish this? Has transportation been arranged? [facility] Pulmonary follow-up should be within 7 days of discharge; all others should have PCP follow-up within 7 days of discharge; follow-ups with other specialists should be within 7-14 days of discharge.) Patients CG declines 3 way call with Care Coordinator to schedule hospital follow up appointment with PCP. This Care Coordinator also offered to obtain an appointment on behalf of the patient with a return call with appointment date and time. Patients CG in agreement. Call placed to [facility] by this Care Coordinator. No answer x 4 attempts  Detailed message left with  regarding need for hospital follow up appointment left on voicemail. Call back with appointment information directly to patient or this Care Coordinator requested. Patients CG made aware. CONFIRMED APPTS:  Cardiology: 1/12/2018 @ 10:30am    Patient has transportation to/from appointments. Any other questions or concerns expressed by the patient? Opportunity to voice questions or concerns provided. No other needs identified. Schedule next appointment with ELLIOT POLANCO Coordinator or refer to RN Case Manager/  per the workflow guidelines. If referred for CCM  what RN care manager was the referral assigned? This Care Coordinator will schedule chart review and/or follow up calls per protocol.      KRYSTIAN Call Completed By:   Abraham Coffey DORCASA  Care Coordinator /  [address] [place] [address] Street

## 2017-12-28 NOTE — TELEPHONE ENCOUNTER
[address] [place] [address] Coleen Acosta Pharmacist consult. I spoke with . Karla Stone who asked me to talk with her caregiver Nico. I [facility].  I reviewed medications. Nico said family had all medications a daily planner ready for use. She said her nebulization solution was plain Albuterol and not the Duo-neb per the AVS.  I explained the combination and recommended she call the PCP who had prescribed the albuterol and see if OK to continue or give a prescription for the Duo-Neb. I gave her my cell phone number and asked her to call me with any medication questions or issues.

## 2017-12-28 NOTE — PROGRESS NOTES
Lying supine, alert and oriented X 4, Lungs sounds diminished, sitter at beside, no acute distress, bilateral LE pitting edema, +1, right heal with eschar, left heal dressing dry and intact.

## 2018-01-01 ENCOUNTER — HOME CARE VISIT (OUTPATIENT)
Dept: SCHEDULING | Facility: HOME HEALTH | Age: 83
End: 2018-01-01
Payer: MEDICARE

## 2018-01-01 ENCOUNTER — APPOINTMENT (OUTPATIENT)
Dept: CT IMAGING | Age: 83
End: 2018-01-01
Attending: EMERGENCY MEDICINE
Payer: MEDICARE

## 2018-01-01 ENCOUNTER — HOME HEALTH ADMISSION (OUTPATIENT)
Dept: HOME HEALTH SERVICES | Facility: HOME HEALTH | Age: 83
End: 2018-01-01
Payer: MEDICARE

## 2018-01-01 ENCOUNTER — HOSPITAL ENCOUNTER (OUTPATIENT)
Age: 83
Setting detail: OBSERVATION
Discharge: HOME | End: 2018-08-16
Attending: EMERGENCY MEDICINE | Admitting: INTERNAL MEDICINE
Payer: MEDICARE

## 2018-01-01 ENCOUNTER — HOME CARE VISIT (OUTPATIENT)
Dept: HOME HEALTH SERVICES | Facility: HOME HEALTH | Age: 83
End: 2018-01-01
Payer: MEDICARE

## 2018-01-01 ENCOUNTER — PATIENT OUTREACH (OUTPATIENT)
Dept: CASE MANAGEMENT | Age: 83
End: 2018-01-01

## 2018-01-01 ENCOUNTER — TELEPHONE (OUTPATIENT)
Dept: HOME HEALTH SERVICES | Facility: HOME HEALTH | Age: 83
End: 2018-01-01

## 2018-01-01 ENCOUNTER — HOSPITAL ENCOUNTER (EMERGENCY)
Age: 83
End: 2018-12-18
Attending: EMERGENCY MEDICINE
Payer: MEDICARE

## 2018-01-01 ENCOUNTER — HOSPITAL ENCOUNTER (OUTPATIENT)
Dept: GENERAL RADIOLOGY | Age: 83
Discharge: HOME | End: 2018-02-08
Attending: FAMILY MEDICINE
Payer: MEDICARE

## 2018-01-01 ENCOUNTER — HOSPITAL ENCOUNTER (OUTPATIENT)
Age: 83
Setting detail: OBSERVATION
Discharge: HOME HEALTH | End: 2018-01-20
Attending: EMERGENCY MEDICINE | Admitting: FAMILY MEDICINE
Payer: MEDICARE

## 2018-01-01 ENCOUNTER — APPOINTMENT (OUTPATIENT)
Dept: GENERAL RADIOLOGY | Age: 83
End: 2018-01-01
Attending: INTERNAL MEDICINE
Payer: MEDICARE

## 2018-01-01 ENCOUNTER — APPOINTMENT (OUTPATIENT)
Dept: GENERAL RADIOLOGY | Age: 83
End: 2018-01-01
Attending: EMERGENCY MEDICINE
Payer: MEDICARE

## 2018-01-01 VITALS
TEMPERATURE: 97.5 F | RESPIRATION RATE: 16 BRPM | HEART RATE: 62 BPM | OXYGEN SATURATION: 94 % | SYSTOLIC BLOOD PRESSURE: 140 MMHG | DIASTOLIC BLOOD PRESSURE: 62 MMHG

## 2018-01-01 VITALS
HEART RATE: 114 BPM | HEIGHT: 66 IN | OXYGEN SATURATION: 71 % | SYSTOLIC BLOOD PRESSURE: 166 MMHG | WEIGHT: 275 LBS | DIASTOLIC BLOOD PRESSURE: 105 MMHG | BODY MASS INDEX: 44.2 KG/M2

## 2018-01-01 VITALS
OXYGEN SATURATION: 84 % | RESPIRATION RATE: 24 BRPM | DIASTOLIC BLOOD PRESSURE: 62 MMHG | SYSTOLIC BLOOD PRESSURE: 160 MMHG | TEMPERATURE: 97.7 F | HEART RATE: 60 BPM

## 2018-01-01 VITALS
WEIGHT: 176 LBS | HEART RATE: 75 BPM | DIASTOLIC BLOOD PRESSURE: 74 MMHG | OXYGEN SATURATION: 92 % | SYSTOLIC BLOOD PRESSURE: 134 MMHG | HEIGHT: 64 IN | RESPIRATION RATE: 18 BRPM | BODY MASS INDEX: 30.05 KG/M2 | TEMPERATURE: 97.3 F

## 2018-01-01 VITALS
RESPIRATION RATE: 16 BRPM | SYSTOLIC BLOOD PRESSURE: 126 MMHG | DIASTOLIC BLOOD PRESSURE: 66 MMHG | HEART RATE: 78 BPM | OXYGEN SATURATION: 95 % | TEMPERATURE: 98.6 F

## 2018-01-01 VITALS
SYSTOLIC BLOOD PRESSURE: 128 MMHG | DIASTOLIC BLOOD PRESSURE: 68 MMHG | TEMPERATURE: 98.5 F | HEART RATE: 78 BPM | RESPIRATION RATE: 16 BRPM | OXYGEN SATURATION: 95 %

## 2018-01-01 VITALS
HEART RATE: 115 BPM | DIASTOLIC BLOOD PRESSURE: 74 MMHG | OXYGEN SATURATION: 98 % | BODY MASS INDEX: 31.24 KG/M2 | TEMPERATURE: 97.5 F | WEIGHT: 183 LBS | SYSTOLIC BLOOD PRESSURE: 157 MMHG | HEIGHT: 64 IN | RESPIRATION RATE: 16 BRPM

## 2018-01-01 VITALS
OXYGEN SATURATION: 92 % | RESPIRATION RATE: 16 BRPM | SYSTOLIC BLOOD PRESSURE: 118 MMHG | DIASTOLIC BLOOD PRESSURE: 58 MMHG | TEMPERATURE: 97.5 F | HEART RATE: 72 BPM

## 2018-01-01 VITALS
DIASTOLIC BLOOD PRESSURE: 78 MMHG | WEIGHT: 185 LBS | BODY MASS INDEX: 31.58 KG/M2 | HEART RATE: 97 BPM | SYSTOLIC BLOOD PRESSURE: 158 MMHG | OXYGEN SATURATION: 94 % | HEIGHT: 64 IN | TEMPERATURE: 97.7 F | RESPIRATION RATE: 17 BRPM

## 2018-01-01 VITALS
HEART RATE: 90 BPM | DIASTOLIC BLOOD PRESSURE: 70 MMHG | TEMPERATURE: 98.2 F | SYSTOLIC BLOOD PRESSURE: 160 MMHG | RESPIRATION RATE: 18 BRPM

## 2018-01-01 VITALS
DIASTOLIC BLOOD PRESSURE: 60 MMHG | TEMPERATURE: 97.7 F | OXYGEN SATURATION: 95 % | RESPIRATION RATE: 16 BRPM | SYSTOLIC BLOOD PRESSURE: 120 MMHG | HEART RATE: 80 BPM

## 2018-01-01 VITALS
DIASTOLIC BLOOD PRESSURE: 64 MMHG | OXYGEN SATURATION: 93 % | HEART RATE: 52 BPM | RESPIRATION RATE: 20 BRPM | SYSTOLIC BLOOD PRESSURE: 126 MMHG | TEMPERATURE: 97.7 F

## 2018-01-01 VITALS
OXYGEN SATURATION: 96 % | DIASTOLIC BLOOD PRESSURE: 66 MMHG | RESPIRATION RATE: 16 BRPM | HEART RATE: 76 BPM | TEMPERATURE: 98.7 F | SYSTOLIC BLOOD PRESSURE: 124 MMHG

## 2018-01-01 VITALS
HEART RATE: 74 BPM | TEMPERATURE: 97.7 F | RESPIRATION RATE: 18 BRPM | OXYGEN SATURATION: 96 % | DIASTOLIC BLOOD PRESSURE: 58 MMHG | SYSTOLIC BLOOD PRESSURE: 114 MMHG

## 2018-01-01 VITALS
TEMPERATURE: 97.8 F | RESPIRATION RATE: 18 BRPM | OXYGEN SATURATION: 93 % | HEART RATE: 95 BPM | DIASTOLIC BLOOD PRESSURE: 70 MMHG | SYSTOLIC BLOOD PRESSURE: 122 MMHG

## 2018-01-01 VITALS
HEART RATE: 67 BPM | DIASTOLIC BLOOD PRESSURE: 54 MMHG | RESPIRATION RATE: 16 BRPM | SYSTOLIC BLOOD PRESSURE: 110 MMHG | OXYGEN SATURATION: 94 % | TEMPERATURE: 97.5 F

## 2018-01-01 VITALS
DIASTOLIC BLOOD PRESSURE: 78 MMHG | SYSTOLIC BLOOD PRESSURE: 170 MMHG | HEART RATE: 110 BPM | OXYGEN SATURATION: 92 % | TEMPERATURE: 98.4 F

## 2018-01-01 VITALS
RESPIRATION RATE: 16 BRPM | SYSTOLIC BLOOD PRESSURE: 126 MMHG | TEMPERATURE: 98.7 F | DIASTOLIC BLOOD PRESSURE: 68 MMHG | HEART RATE: 74 BPM | OXYGEN SATURATION: 97 %

## 2018-01-01 VITALS
HEART RATE: 74 BPM | SYSTOLIC BLOOD PRESSURE: 100 MMHG | RESPIRATION RATE: 16 BRPM | TEMPERATURE: 97.8 F | DIASTOLIC BLOOD PRESSURE: 70 MMHG

## 2018-01-01 VITALS
OXYGEN SATURATION: 95 % | DIASTOLIC BLOOD PRESSURE: 60 MMHG | RESPIRATION RATE: 18 BRPM | HEART RATE: 64 BPM | SYSTOLIC BLOOD PRESSURE: 110 MMHG | TEMPERATURE: 97.7 F

## 2018-01-01 VITALS
RESPIRATION RATE: 16 BRPM | SYSTOLIC BLOOD PRESSURE: 148 MMHG | HEART RATE: 80 BPM | DIASTOLIC BLOOD PRESSURE: 88 MMHG | TEMPERATURE: 97.3 F

## 2018-01-01 VITALS
HEART RATE: 70 BPM | DIASTOLIC BLOOD PRESSURE: 58 MMHG | SYSTOLIC BLOOD PRESSURE: 110 MMHG | TEMPERATURE: 99 F | OXYGEN SATURATION: 93 %

## 2018-01-01 VITALS
HEART RATE: 64 BPM | RESPIRATION RATE: 18 BRPM | OXYGEN SATURATION: 94 % | TEMPERATURE: 97.7 F | SYSTOLIC BLOOD PRESSURE: 110 MMHG | DIASTOLIC BLOOD PRESSURE: 60 MMHG

## 2018-01-01 VITALS
HEART RATE: 91 BPM | DIASTOLIC BLOOD PRESSURE: 70 MMHG | SYSTOLIC BLOOD PRESSURE: 130 MMHG | RESPIRATION RATE: 18 BRPM | OXYGEN SATURATION: 97 %

## 2018-01-01 VITALS
OXYGEN SATURATION: 95 % | HEART RATE: 70 BPM | TEMPERATURE: 97.7 F | SYSTOLIC BLOOD PRESSURE: 118 MMHG | DIASTOLIC BLOOD PRESSURE: 60 MMHG | RESPIRATION RATE: 20 BRPM

## 2018-01-01 VITALS
RESPIRATION RATE: 20 BRPM | TEMPERATURE: 98.3 F | OXYGEN SATURATION: 93 % | HEART RATE: 81 BPM | DIASTOLIC BLOOD PRESSURE: 68 MMHG | SYSTOLIC BLOOD PRESSURE: 130 MMHG

## 2018-01-01 VITALS
HEART RATE: 78 BPM | OXYGEN SATURATION: 95 % | TEMPERATURE: 98.6 F | SYSTOLIC BLOOD PRESSURE: 126 MMHG | RESPIRATION RATE: 16 BRPM | DIASTOLIC BLOOD PRESSURE: 66 MMHG

## 2018-01-01 VITALS
TEMPERATURE: 97.5 F | RESPIRATION RATE: 24 BRPM | HEART RATE: 64 BPM | SYSTOLIC BLOOD PRESSURE: 128 MMHG | DIASTOLIC BLOOD PRESSURE: 80 MMHG | OXYGEN SATURATION: 88 %

## 2018-01-01 VITALS
DIASTOLIC BLOOD PRESSURE: 62 MMHG | TEMPERATURE: 98 F | HEART RATE: 88 BPM | RESPIRATION RATE: 18 BRPM | SYSTOLIC BLOOD PRESSURE: 132 MMHG | OXYGEN SATURATION: 94 %

## 2018-01-01 VITALS
SYSTOLIC BLOOD PRESSURE: 120 MMHG | DIASTOLIC BLOOD PRESSURE: 80 MMHG | TEMPERATURE: 97.7 F | RESPIRATION RATE: 17 BRPM | OXYGEN SATURATION: 94 % | HEART RATE: 80 BPM

## 2018-01-01 VITALS
SYSTOLIC BLOOD PRESSURE: 110 MMHG | RESPIRATION RATE: 17 BRPM | TEMPERATURE: 97 F | OXYGEN SATURATION: 91 % | HEART RATE: 68 BPM | DIASTOLIC BLOOD PRESSURE: 78 MMHG

## 2018-01-01 VITALS
RESPIRATION RATE: 24 BRPM | HEART RATE: 84 BPM | TEMPERATURE: 96.8 F | DIASTOLIC BLOOD PRESSURE: 80 MMHG | SYSTOLIC BLOOD PRESSURE: 138 MMHG

## 2018-01-01 VITALS
OXYGEN SATURATION: 95 % | TEMPERATURE: 96.7 F | SYSTOLIC BLOOD PRESSURE: 140 MMHG | RESPIRATION RATE: 20 BRPM | DIASTOLIC BLOOD PRESSURE: 80 MMHG | HEART RATE: 80 BPM

## 2018-01-01 VITALS
HEART RATE: 96 BPM | DIASTOLIC BLOOD PRESSURE: 62 MMHG | RESPIRATION RATE: 18 BRPM | TEMPERATURE: 97.9 F | SYSTOLIC BLOOD PRESSURE: 122 MMHG | OXYGEN SATURATION: 96 %

## 2018-01-01 VITALS
RESPIRATION RATE: 17 BRPM | SYSTOLIC BLOOD PRESSURE: 118 MMHG | TEMPERATURE: 97.8 F | OXYGEN SATURATION: 98 % | DIASTOLIC BLOOD PRESSURE: 70 MMHG | HEART RATE: 76 BPM

## 2018-01-01 VITALS
SYSTOLIC BLOOD PRESSURE: 140 MMHG | DIASTOLIC BLOOD PRESSURE: 80 MMHG | HEART RATE: 76 BPM | RESPIRATION RATE: 36 BRPM | OXYGEN SATURATION: 91 % | TEMPERATURE: 96.1 F

## 2018-01-01 VITALS
HEART RATE: 66 BPM | DIASTOLIC BLOOD PRESSURE: 60 MMHG | SYSTOLIC BLOOD PRESSURE: 120 MMHG | OXYGEN SATURATION: 98 % | TEMPERATURE: 97.7 F | RESPIRATION RATE: 16 BRPM

## 2018-01-01 VITALS
DIASTOLIC BLOOD PRESSURE: 74 MMHG | OXYGEN SATURATION: 98 % | TEMPERATURE: 98 F | HEART RATE: 84 BPM | SYSTOLIC BLOOD PRESSURE: 130 MMHG | RESPIRATION RATE: 18 BRPM

## 2018-01-01 DIAGNOSIS — R06.03 RESPIRATORY DISTRESS: ICD-10-CM

## 2018-01-01 DIAGNOSIS — E87.5 ACUTE HYPERKALEMIA: ICD-10-CM

## 2018-01-01 DIAGNOSIS — I46.9 CARDIAC ARREST (HCC): Primary | ICD-10-CM

## 2018-01-01 DIAGNOSIS — R06.2 WHEEZING: Primary | ICD-10-CM

## 2018-01-01 DIAGNOSIS — I50.32 CHRONIC DIASTOLIC CONGESTIVE HEART FAILURE (HCC): Chronic | ICD-10-CM

## 2018-01-01 DIAGNOSIS — J81.0 ACUTE PULMONARY EDEMA (HCC): ICD-10-CM

## 2018-01-01 DIAGNOSIS — R41.89 UNRESPONSIVE EPISODE: Primary | ICD-10-CM

## 2018-01-01 DIAGNOSIS — R00.1 BRADYCARDIA: ICD-10-CM

## 2018-01-01 DIAGNOSIS — J96.21 ACUTE ON CHRONIC RESPIRATORY FAILURE WITH HYPOXIA (HCC): ICD-10-CM

## 2018-01-01 DIAGNOSIS — R13.10 DYSPHAGIA, UNSPECIFIED TYPE: ICD-10-CM

## 2018-01-01 DIAGNOSIS — J84.10 PULMONARY FIBROSIS (HCC): Chronic | ICD-10-CM

## 2018-01-01 DIAGNOSIS — R09.02 HYPOXIA: ICD-10-CM

## 2018-01-01 DIAGNOSIS — E03.9 HYPOTHYROIDISM, UNSPECIFIED TYPE: ICD-10-CM

## 2018-01-01 LAB
ALBUMIN SERPL-MCNC: 2.4 G/DL (ref 3.2–4.6)
ALBUMIN SERPL-MCNC: 2.9 G/DL (ref 3.2–4.6)
ALBUMIN/GLOB SERPL: 0.5 {RATIO} (ref 1.2–3.5)
ALBUMIN/GLOB SERPL: 0.6 {RATIO} (ref 1.2–3.5)
ALP SERPL-CCNC: 67 U/L (ref 50–136)
ALP SERPL-CCNC: 74 U/L (ref 50–136)
ALT SERPL-CCNC: 16 U/L (ref 12–65)
ALT SERPL-CCNC: 17 U/L (ref 12–65)
ANION GAP SERPL CALC-SCNC: 10 MMOL/L (ref 7–16)
ANION GAP SERPL CALC-SCNC: 12 MMOL/L (ref 7–16)
ANION GAP SERPL CALC-SCNC: 6 MMOL/L (ref 7–16)
ANION GAP SERPL CALC-SCNC: 9 MMOL/L (ref 7–16)
ANION GAP SERPL CALC-SCNC: 9 MMOL/L (ref 7–16)
APPEARANCE UR: CLEAR
APTT PPP: 56.3 SEC (ref 24.7–39.8)
AST SERPL-CCNC: 30 U/L (ref 15–37)
AST SERPL-CCNC: 36 U/L (ref 15–37)
ATRIAL RATE: 174 BPM
ATRIAL RATE: 326 BPM
ATRIAL RATE: 98 BPM
BACTERIA SPEC CULT: NORMAL
BACTERIA SPEC CULT: NORMAL
BACTERIA URNS QL MICRO: 0 /HPF
BASOPHILS # BLD: 0 K/UL (ref 0–0.2)
BASOPHILS # BLD: 0.1 K/UL
BASOPHILS NFR BLD: 0 % (ref 0–2)
BASOPHILS NFR BLD: 0 % (ref 0–2)
BILIRUB SERPL-MCNC: 0.2 MG/DL (ref 0.2–1.1)
BILIRUB SERPL-MCNC: 0.3 MG/DL (ref 0.2–1.1)
BILIRUB UR QL: NEGATIVE
BNP SERPL-MCNC: 264 PG/ML
BNP SERPL-MCNC: 309 PG/ML
BUN SERPL-MCNC: 29 MG/DL (ref 8–23)
BUN SERPL-MCNC: 31 MG/DL (ref 8–23)
BUN SERPL-MCNC: 32 MG/DL (ref 8–23)
BUN SERPL-MCNC: 33 MG/DL (ref 8–23)
BUN SERPL-MCNC: 34 MG/DL (ref 8–23)
BUN SERPL-MCNC: 36 MG/DL (ref 8–23)
BUN SERPL-MCNC: 41 MG/DL (ref 8–23)
CALCIUM SERPL-MCNC: 8 MG/DL (ref 8.3–10.4)
CALCIUM SERPL-MCNC: 8.1 MG/DL (ref 8.3–10.4)
CALCIUM SERPL-MCNC: 8.2 MG/DL (ref 8.3–10.4)
CALCIUM SERPL-MCNC: 8.4 MG/DL (ref 8.3–10.4)
CALCIUM SERPL-MCNC: 9 MG/DL (ref 8.3–10.4)
CALCULATED R AXIS, ECG10: 100 DEGREES
CALCULATED R AXIS, ECG10: 109 DEGREES
CALCULATED R AXIS, ECG10: 97 DEGREES
CALCULATED T AXIS, ECG11: -56 DEGREES
CALCULATED T AXIS, ECG11: 4 DEGREES
CALCULATED T AXIS, ECG11: 67 DEGREES
CASTS URNS QL MICRO: ABNORMAL /LPF
CHLORIDE SERPL-SCNC: 104 MMOL/L (ref 98–107)
CHLORIDE SERPL-SCNC: 107 MMOL/L (ref 98–107)
CHLORIDE SERPL-SCNC: 108 MMOL/L (ref 98–107)
CHLORIDE SERPL-SCNC: 111 MMOL/L (ref 98–107)
CHLORIDE SERPL-SCNC: 97 MMOL/L (ref 98–107)
CHLORIDE SERPL-SCNC: 97 MMOL/L (ref 98–107)
CHLORIDE SERPL-SCNC: 99 MMOL/L (ref 98–107)
CO2 SERPL-SCNC: 26 MMOL/L (ref 21–32)
CO2 SERPL-SCNC: 26 MMOL/L (ref 21–32)
CO2 SERPL-SCNC: 27 MMOL/L (ref 21–32)
CO2 SERPL-SCNC: 28 MMOL/L (ref 21–32)
CO2 SERPL-SCNC: 29 MMOL/L (ref 21–32)
CO2 SERPL-SCNC: 29 MMOL/L (ref 21–32)
CO2 SERPL-SCNC: 30 MMOL/L (ref 21–32)
COLOR UR: YELLOW
CREAT SERPL-MCNC: 1.57 MG/DL (ref 0.6–1)
CREAT SERPL-MCNC: 1.68 MG/DL (ref 0.6–1)
CREAT SERPL-MCNC: 1.73 MG/DL (ref 0.6–1)
CREAT SERPL-MCNC: 1.86 MG/DL (ref 0.6–1)
CREAT SERPL-MCNC: 1.89 MG/DL (ref 0.6–1)
CREAT SERPL-MCNC: 1.97 MG/DL (ref 0.6–1)
CREAT SERPL-MCNC: 2.04 MG/DL (ref 0.6–1)
DIAGNOSIS, 93000: NORMAL
DIFFERENTIAL METHOD BLD: ABNORMAL
DIFFERENTIAL METHOD BLD: ABNORMAL
EOSINOPHIL # BLD: 0.2 K/UL (ref 0–0.8)
EOSINOPHIL # BLD: 0.3 K/UL
EOSINOPHIL NFR BLD: 2 % (ref 0.5–7.8)
EOSINOPHIL NFR BLD: 2 % (ref 0.5–7.8)
EPI CELLS #/AREA URNS HPF: ABNORMAL /HPF
ERYTHROCYTE [DISTWIDTH] IN BLOOD BY AUTOMATED COUNT: 16.3 %
ERYTHROCYTE [DISTWIDTH] IN BLOOD BY AUTOMATED COUNT: 16.5 %
ERYTHROCYTE [DISTWIDTH] IN BLOOD BY AUTOMATED COUNT: 19.2 % (ref 11.9–14.6)
ERYTHROCYTE [DISTWIDTH] IN BLOOD BY AUTOMATED COUNT: 19.6 % (ref 11.9–14.6)
FLUAV AG NPH QL IA: NEGATIVE
FLUBV AG NPH QL IA: NEGATIVE
GLOBULIN SER CALC-MCNC: 4.5 G/DL (ref 2.3–3.5)
GLOBULIN SER CALC-MCNC: 4.6 G/DL (ref 2.3–3.5)
GLUCOSE SERPL-MCNC: 124 MG/DL (ref 65–100)
GLUCOSE SERPL-MCNC: 133 MG/DL (ref 65–100)
GLUCOSE SERPL-MCNC: 139 MG/DL (ref 65–100)
GLUCOSE SERPL-MCNC: 167 MG/DL (ref 65–100)
GLUCOSE SERPL-MCNC: 170 MG/DL (ref 65–100)
GLUCOSE SERPL-MCNC: 174 MG/DL (ref 65–100)
GLUCOSE SERPL-MCNC: 211 MG/DL (ref 65–100)
GLUCOSE UR STRIP.AUTO-MCNC: NEGATIVE MG/DL
HCT VFR BLD AUTO: 30.1 % (ref 35.8–46.3)
HCT VFR BLD AUTO: 35.3 % (ref 35.8–46.3)
HCT VFR BLD AUTO: 36.2 % (ref 35.8–46.3)
HCT VFR BLD AUTO: 36.5 % (ref 35.8–46.3)
HGB BLD-MCNC: 10.6 G/DL (ref 11.7–15.4)
HGB BLD-MCNC: 11.2 G/DL (ref 11.7–15.4)
HGB BLD-MCNC: 11.3 G/DL (ref 11.7–15.4)
HGB BLD-MCNC: 9.1 G/DL (ref 11.7–15.4)
HGB UR QL STRIP: ABNORMAL
IMM GRANULOCYTES # BLD: 0.1 K/UL
IMM GRANULOCYTES # BLD: 0.1 K/UL (ref 0–0.5)
IMM GRANULOCYTES NFR BLD AUTO: 1 % (ref 0–5)
IMM GRANULOCYTES NFR BLD AUTO: 1 % (ref 0–5)
INR PPP: 1.2
INR PPP: 1.7
KETONES UR QL STRIP.AUTO: NEGATIVE MG/DL
LACTATE BLD-SCNC: 4.1 MMOL/L (ref 0.5–1.9)
LACTATE BLD-SCNC: 9.62 MMOL/L (ref 0.5–1.9)
LEUKOCYTE ESTERASE UR QL STRIP.AUTO: NEGATIVE
LYMPHOCYTES # BLD: 1 K/UL
LYMPHOCYTES # BLD: 1.5 K/UL (ref 0.5–4.6)
LYMPHOCYTES NFR BLD: 15 % (ref 13–44)
LYMPHOCYTES NFR BLD: 8 % (ref 13–44)
MCH RBC QN AUTO: 27 PG (ref 26.1–32.9)
MCH RBC QN AUTO: 27 PG (ref 26.1–32.9)
MCH RBC QN AUTO: 28.1 PG (ref 26.1–32.9)
MCH RBC QN AUTO: 28.3 PG (ref 26.1–32.9)
MCHC RBC AUTO-ENTMCNC: 30 G/DL (ref 31.4–35)
MCHC RBC AUTO-ENTMCNC: 30.2 G/DL (ref 31.4–35)
MCHC RBC AUTO-ENTMCNC: 30.9 G/DL (ref 31.4–35)
MCHC RBC AUTO-ENTMCNC: 31 G/DL (ref 31.4–35)
MCV RBC AUTO: 87.2 FL (ref 79.6–97.8)
MCV RBC AUTO: 87.3 FL (ref 79.6–97.8)
MCV RBC AUTO: 93.6 FL (ref 79.6–97.8)
MCV RBC AUTO: 93.8 FL (ref 79.6–97.8)
MM INDURATION POC: 0 MM (ref 0–5)
MONOCYTES # BLD: 0.6 K/UL
MONOCYTES # BLD: 0.7 K/UL (ref 0.1–1.3)
MONOCYTES NFR BLD: 5 % (ref 4–12)
MONOCYTES NFR BLD: 7 % (ref 4–12)
NEUTS SEG # BLD: 11 K/UL
NEUTS SEG # BLD: 7.4 K/UL (ref 1.7–8.2)
NEUTS SEG NFR BLD: 76 % (ref 43–78)
NEUTS SEG NFR BLD: 85 % (ref 43–78)
NITRITE UR QL STRIP.AUTO: NEGATIVE
NRBC # BLD: 0 K/UL (ref 0–0.2)
NRBC # BLD: 0 K/UL (ref 0–0.2)
PH UR STRIP: 5.5 [PH] (ref 5–9)
PLATELET # BLD AUTO: 320 K/UL (ref 150–450)
PLATELET # BLD AUTO: 325 K/UL (ref 150–450)
PLATELET # BLD AUTO: 349 K/UL (ref 150–450)
PLATELET # BLD AUTO: 365 K/UL (ref 150–450)
PLATELET COMMENTS,PCOM: ADEQUATE
PMV BLD AUTO: 10.9 FL (ref 10.8–14.1)
PMV BLD AUTO: 11 FL (ref 10.8–14.1)
PMV BLD AUTO: 11.9 FL (ref 9.4–12.3)
PMV BLD AUTO: 12.3 FL (ref 9.4–12.3)
POTASSIUM SERPL-SCNC: 4.1 MMOL/L (ref 3.5–5.1)
POTASSIUM SERPL-SCNC: 4.2 MMOL/L (ref 3.5–5.1)
POTASSIUM SERPL-SCNC: 4.4 MMOL/L (ref 3.5–5.1)
POTASSIUM SERPL-SCNC: 4.5 MMOL/L (ref 3.5–5.1)
POTASSIUM SERPL-SCNC: 4.9 MMOL/L (ref 3.5–5.1)
POTASSIUM SERPL-SCNC: 5.1 MMOL/L (ref 3.5–5.1)
POTASSIUM SERPL-SCNC: 5.5 MMOL/L (ref 3.5–5.1)
PPD POC: NEGATIVE NEGATIVE
PROCALCITONIN SERPL-MCNC: <0.1 NG/ML
PROT SERPL-MCNC: 6.9 G/DL (ref 6.3–8.2)
PROT SERPL-MCNC: 7.5 G/DL (ref 6.3–8.2)
PROT UR STRIP-MCNC: 30 MG/DL
PROTHROMBIN TIME: 14.3 SEC (ref 11.5–14.5)
PROTHROMBIN TIME: 19.6 SEC (ref 11.7–14.5)
Q-T INTERVAL, ECG07: 300 MS
Q-T INTERVAL, ECG07: 342 MS
Q-T INTERVAL, ECG07: 370 MS
QRS DURATION, ECG06: 72 MS
QRS DURATION, ECG06: 74 MS
QRS DURATION, ECG06: 84 MS
QTC CALCULATION (BEZET), ECG08: 399 MS
QTC CALCULATION (BEZET), ECG08: 402 MS
QTC CALCULATION (BEZET), ECG08: 469 MS
RBC # BLD AUTO: 3.21 M/UL (ref 4.05–5.2)
RBC # BLD AUTO: 3.77 M/UL (ref 4.05–5.2)
RBC # BLD AUTO: 4.15 M/UL (ref 4.05–5.25)
RBC # BLD AUTO: 4.18 M/UL (ref 4.05–5.25)
RBC #/AREA URNS HPF: ABNORMAL /HPF
RBC MORPH BLD: ABNORMAL
SERVICE CMNT-IMP: NORMAL
SERVICE CMNT-IMP: NORMAL
SODIUM SERPL-SCNC: 135 MMOL/L (ref 136–145)
SODIUM SERPL-SCNC: 136 MMOL/L (ref 136–145)
SODIUM SERPL-SCNC: 138 MMOL/L (ref 136–145)
SODIUM SERPL-SCNC: 143 MMOL/L (ref 136–145)
SODIUM SERPL-SCNC: 143 MMOL/L (ref 136–145)
SODIUM SERPL-SCNC: 144 MMOL/L (ref 136–145)
SODIUM SERPL-SCNC: 145 MMOL/L (ref 136–145)
SP GR UR REFRACTOMETRY: 1.02 (ref 1–1.02)
T4 FREE SERPL-MCNC: 1 NG/DL (ref 0.78–1.46)
TROPONIN I BLD-MCNC: 0.04 NG/ML (ref 0.02–0.05)
TROPONIN I SERPL-MCNC: 0.07 NG/ML (ref 0.02–0.05)
TSH SERPL DL<=0.005 MIU/L-ACNC: 7.87 UIU/ML (ref 0.36–3.74)
UROBILINOGEN UR QL STRIP.AUTO: 0.2 EU/DL (ref 0.2–1)
VENTRICULAR RATE, ECG03: 108 BPM
VENTRICULAR RATE, ECG03: 82 BPM
VENTRICULAR RATE, ECG03: 97 BPM
WBC # BLD AUTO: 10.2 K/UL (ref 4.3–11.1)
WBC # BLD AUTO: 11.2 K/UL (ref 4.3–11.1)
WBC # BLD AUTO: 13 K/UL (ref 4.3–11.1)
WBC # BLD AUTO: 9.8 K/UL (ref 4.3–11.1)
WBC MORPH BLD: ABNORMAL
WBC URNS QL MICRO: ABNORMAL /HPF

## 2018-01-01 PROCEDURE — 74011250636 HC RX REV CODE- 250/636: Performed by: EMERGENCY MEDICINE

## 2018-01-01 PROCEDURE — 74011000250 HC RX REV CODE- 250: Performed by: INTERNAL MEDICINE

## 2018-01-01 PROCEDURE — 83605 ASSAY OF LACTIC ACID: CPT

## 2018-01-01 PROCEDURE — 74011250637 HC RX REV CODE- 250/637: Performed by: FAMILY MEDICINE

## 2018-01-01 PROCEDURE — 96372 THER/PROPH/DIAG INJ SC/IM: CPT

## 2018-01-01 PROCEDURE — 93005 ELECTROCARDIOGRAM TRACING: CPT | Performed by: EMERGENCY MEDICINE

## 2018-01-01 PROCEDURE — 77030011943

## 2018-01-01 PROCEDURE — G8997 SWALLOW GOAL STATUS: HCPCS

## 2018-01-01 PROCEDURE — G8996 SWALLOW CURRENT STATUS: HCPCS | Performed by: SPEECH-LANGUAGE PATHOLOGIST

## 2018-01-01 PROCEDURE — 74011000250 HC RX REV CODE- 250

## 2018-01-01 PROCEDURE — G0157 HHC PT ASSISTANT EA 15: HCPCS

## 2018-01-01 PROCEDURE — 74011636637 HC RX REV CODE- 636/637: Performed by: INTERNAL MEDICINE

## 2018-01-01 PROCEDURE — 94760 N-INVAS EAR/PLS OXIMETRY 1: CPT

## 2018-01-01 PROCEDURE — 74011250637 HC RX REV CODE- 250/637: Performed by: INTERNAL MEDICINE

## 2018-01-01 PROCEDURE — 96366 THER/PROPH/DIAG IV INF ADDON: CPT | Performed by: EMERGENCY MEDICINE

## 2018-01-01 PROCEDURE — 70491 CT SOFT TISSUE NECK W/DYE: CPT

## 2018-01-01 PROCEDURE — 85025 COMPLETE CBC W/AUTO DIFF WBC: CPT

## 2018-01-01 PROCEDURE — 94640 AIRWAY INHALATION TREATMENT: CPT

## 2018-01-01 PROCEDURE — 77010033678 HC OXYGEN DAILY

## 2018-01-01 PROCEDURE — 97162 PT EVAL MOD COMPLEX 30 MIN: CPT

## 2018-01-01 PROCEDURE — G0152 HHCP-SERV OF OT,EA 15 MIN: HCPCS

## 2018-01-01 PROCEDURE — G0151 HHCP-SERV OF PT,EA 15 MIN: HCPCS

## 2018-01-01 PROCEDURE — 97530 THERAPEUTIC ACTIVITIES: CPT

## 2018-01-01 PROCEDURE — 85027 COMPLETE CBC AUTOMATED: CPT | Performed by: EMERGENCY MEDICINE

## 2018-01-01 PROCEDURE — 74230 X-RAY XM SWLNG FUNCJ C+: CPT

## 2018-01-01 PROCEDURE — G8979 MOBILITY GOAL STATUS: HCPCS

## 2018-01-01 PROCEDURE — 74011000258 HC RX REV CODE- 258: Performed by: EMERGENCY MEDICINE

## 2018-01-01 PROCEDURE — 81003 URINALYSIS AUTO W/O SCOPE: CPT | Performed by: EMERGENCY MEDICINE

## 2018-01-01 PROCEDURE — G8988 SELF CARE GOAL STATUS: HCPCS

## 2018-01-01 PROCEDURE — 80053 COMPREHEN METABOLIC PANEL: CPT

## 2018-01-01 PROCEDURE — 74011250636 HC RX REV CODE- 250/636: Performed by: FAMILY MEDICINE

## 2018-01-01 PROCEDURE — 74011250636 HC RX REV CODE- 250/636: Performed by: INTERNAL MEDICINE

## 2018-01-01 PROCEDURE — 99282 EMERGENCY DEPT VISIT SF MDM: CPT | Performed by: EMERGENCY MEDICINE

## 2018-01-01 PROCEDURE — 80053 COMPREHEN METABOLIC PANEL: CPT | Performed by: EMERGENCY MEDICINE

## 2018-01-01 PROCEDURE — G8997 SWALLOW GOAL STATUS: HCPCS | Performed by: SPEECH-LANGUAGE PATHOLOGIST

## 2018-01-01 PROCEDURE — 85610 PROTHROMBIN TIME: CPT

## 2018-01-01 PROCEDURE — 83880 ASSAY OF NATRIURETIC PEPTIDE: CPT

## 2018-01-01 PROCEDURE — 74011000250 HC RX REV CODE- 250: Performed by: FAMILY MEDICINE

## 2018-01-01 PROCEDURE — 99218 HC RM OBSERVATION: CPT

## 2018-01-01 PROCEDURE — 74011636320 HC RX REV CODE- 636/320: Performed by: EMERGENCY MEDICINE

## 2018-01-01 PROCEDURE — G0158 HHC OT ASSISTANT EA 15: HCPCS

## 2018-01-01 PROCEDURE — 36415 COLL VENOUS BLD VENIPUNCTURE: CPT

## 2018-01-01 PROCEDURE — 74011636637 HC RX REV CODE- 636/637: Performed by: FAMILY MEDICINE

## 2018-01-01 PROCEDURE — 84439 ASSAY OF FREE THYROXINE: CPT

## 2018-01-01 PROCEDURE — 92950 HEART/LUNG RESUSCITATION CPR: CPT | Performed by: EMERGENCY MEDICINE

## 2018-01-01 PROCEDURE — 74011000255 HC RX REV CODE- 255

## 2018-01-01 PROCEDURE — 84145 PROCALCITONIN (PCT): CPT

## 2018-01-01 PROCEDURE — G8987 SELF CARE CURRENT STATUS: HCPCS

## 2018-01-01 PROCEDURE — 99285 EMERGENCY DEPT VISIT HI MDM: CPT | Performed by: EMERGENCY MEDICINE

## 2018-01-01 PROCEDURE — 80048 BASIC METABOLIC PNL TOTAL CA: CPT | Performed by: EMERGENCY MEDICINE

## 2018-01-01 PROCEDURE — 36415 COLL VENOUS BLD VENIPUNCTURE: CPT | Performed by: EMERGENCY MEDICINE

## 2018-01-01 PROCEDURE — 400013 HH SOC

## 2018-01-01 PROCEDURE — 99220 PR INITIAL OBSERVATION CARE/DAY 70 MINUTES: CPT | Performed by: INTERNAL MEDICINE

## 2018-01-01 PROCEDURE — 96361 HYDRATE IV INFUSION ADD-ON: CPT | Performed by: EMERGENCY MEDICINE

## 2018-01-01 PROCEDURE — 74011000250 HC RX REV CODE- 250: Performed by: EMERGENCY MEDICINE

## 2018-01-01 PROCEDURE — 97110 THERAPEUTIC EXERCISES: CPT

## 2018-01-01 PROCEDURE — 96374 THER/PROPH/DIAG INJ IV PUSH: CPT

## 2018-01-01 PROCEDURE — G0153 HHCP-SVS OF S/L PATH,EA 15MN: HCPCS

## 2018-01-01 PROCEDURE — G0299 HHS/HOSPICE OF RN EA 15 MIN: HCPCS

## 2018-01-01 PROCEDURE — 70450 CT HEAD/BRAIN W/O DYE: CPT

## 2018-01-01 PROCEDURE — G8998 SWALLOW D/C STATUS: HCPCS

## 2018-01-01 PROCEDURE — 74011250637 HC RX REV CODE- 250/637: Performed by: EMERGENCY MEDICINE

## 2018-01-01 PROCEDURE — 92611 MOTION FLUOROSCOPY/SWALLOW: CPT

## 2018-01-01 PROCEDURE — G8989 SELF CARE D/C STATUS: HCPCS

## 2018-01-01 PROCEDURE — 87804 INFLUENZA ASSAY W/OPTIC: CPT | Performed by: INTERNAL MEDICINE

## 2018-01-01 PROCEDURE — 97161 PT EVAL LOW COMPLEX 20 MIN: CPT

## 2018-01-01 PROCEDURE — 96365 THER/PROPH/DIAG IV INF INIT: CPT | Performed by: EMERGENCY MEDICINE

## 2018-01-01 PROCEDURE — 96360 HYDRATION IV INFUSION INIT: CPT | Performed by: EMERGENCY MEDICINE

## 2018-01-01 PROCEDURE — 77030019605

## 2018-01-01 PROCEDURE — G8980 MOBILITY D/C STATUS: HCPCS

## 2018-01-01 PROCEDURE — 85027 COMPLETE CBC AUTOMATED: CPT

## 2018-01-01 PROCEDURE — 80048 BASIC METABOLIC PNL TOTAL CA: CPT

## 2018-01-01 PROCEDURE — 85730 THROMBOPLASTIN TIME PARTIAL: CPT

## 2018-01-01 PROCEDURE — 71045 X-RAY EXAM CHEST 1 VIEW: CPT

## 2018-01-01 PROCEDURE — 84484 ASSAY OF TROPONIN QUANT: CPT | Performed by: EMERGENCY MEDICINE

## 2018-01-01 PROCEDURE — G8998 SWALLOW D/C STATUS: HCPCS | Performed by: SPEECH-LANGUAGE PATHOLOGIST

## 2018-01-01 PROCEDURE — 87040 BLOOD CULTURE FOR BACTERIA: CPT

## 2018-01-01 PROCEDURE — 85025 COMPLETE CBC W/AUTO DIFF WBC: CPT | Performed by: EMERGENCY MEDICINE

## 2018-01-01 PROCEDURE — 84484 ASSAY OF TROPONIN QUANT: CPT

## 2018-01-01 PROCEDURE — 75810000275 HC EMERGENCY DEPT VISIT NO LEVEL OF CARE: Performed by: EMERGENCY MEDICINE

## 2018-01-01 PROCEDURE — 84443 ASSAY THYROID STIM HORMONE: CPT

## 2018-01-01 PROCEDURE — 96375 TX/PRO/DX INJ NEW DRUG ADDON: CPT

## 2018-01-01 PROCEDURE — G8996 SWALLOW CURRENT STATUS: HCPCS

## 2018-01-01 PROCEDURE — 83880 ASSAY OF NATRIURETIC PEPTIDE: CPT | Performed by: EMERGENCY MEDICINE

## 2018-01-01 PROCEDURE — 97165 OT EVAL LOW COMPLEX 30 MIN: CPT

## 2018-01-01 PROCEDURE — G8978 MOBILITY CURRENT STATUS: HCPCS

## 2018-01-01 PROCEDURE — 97166 OT EVAL MOD COMPLEX 45 MIN: CPT

## 2018-01-01 PROCEDURE — C8929 TTE W OR WO FOL WCON,DOPPLER: HCPCS

## 2018-01-01 PROCEDURE — 81001 URINALYSIS AUTO W/SCOPE: CPT

## 2018-01-01 PROCEDURE — 92610 EVALUATE SWALLOWING FUNCTION: CPT | Performed by: SPEECH-LANGUAGE PATHOLOGIST

## 2018-01-01 RX ORDER — LANOLIN ALCOHOL/MO/W.PET/CERES
400 CREAM (GRAM) TOPICAL 2 TIMES DAILY
Status: DISCONTINUED | OUTPATIENT
Start: 2018-01-01 | End: 2018-01-01 | Stop reason: HOSPADM

## 2018-01-01 RX ORDER — SODIUM CHLORIDE 0.9 % (FLUSH) 0.9 %
5-10 SYRINGE (ML) INJECTION EVERY 8 HOURS
Status: DISCONTINUED | OUTPATIENT
Start: 2018-01-01 | End: 2018-01-01 | Stop reason: HOSPADM

## 2018-01-01 RX ORDER — DILTIAZEM HYDROCHLORIDE 120 MG/1
120 CAPSULE, COATED, EXTENDED RELEASE ORAL DAILY
Status: DISCONTINUED | OUTPATIENT
Start: 2018-01-01 | End: 2018-01-01 | Stop reason: HOSPADM

## 2018-01-01 RX ORDER — IPRATROPIUM BROMIDE AND ALBUTEROL SULFATE 2.5; .5 MG/3ML; MG/3ML
3 SOLUTION RESPIRATORY (INHALATION)
Status: DISCONTINUED | OUTPATIENT
Start: 2018-01-01 | End: 2018-01-01 | Stop reason: HOSPADM

## 2018-01-01 RX ORDER — FUROSEMIDE 10 MG/ML
40 INJECTION INTRAMUSCULAR; INTRAVENOUS ONCE
Status: COMPLETED | OUTPATIENT
Start: 2018-01-01 | End: 2018-01-01

## 2018-01-01 RX ORDER — BUDESONIDE 0.5 MG/2ML
500 INHALANT ORAL
Status: DISCONTINUED | OUTPATIENT
Start: 2018-01-01 | End: 2018-01-01 | Stop reason: HOSPADM

## 2018-01-01 RX ORDER — ALBUTEROL SULFATE 0.83 MG/ML
5 SOLUTION RESPIRATORY (INHALATION)
Status: COMPLETED | OUTPATIENT
Start: 2018-01-01 | End: 2018-01-01

## 2018-01-01 RX ORDER — OXYMETAZOLINE HCL 0.05 %
2 SPRAY, NON-AEROSOL (ML) NASAL
Status: COMPLETED | OUTPATIENT
Start: 2018-01-01 | End: 2018-01-01

## 2018-01-01 RX ORDER — FUROSEMIDE 40 MG/1
40 TABLET ORAL DAILY
Status: CANCELLED | OUTPATIENT
Start: 2018-01-01

## 2018-01-01 RX ORDER — ONDANSETRON 2 MG/ML
4 INJECTION INTRAMUSCULAR; INTRAVENOUS
Status: DISCONTINUED | OUTPATIENT
Start: 2018-01-01 | End: 2018-01-01 | Stop reason: HOSPADM

## 2018-01-01 RX ORDER — ASPIRIN 81 MG/1
81 TABLET ORAL DAILY
Status: DISCONTINUED | OUTPATIENT
Start: 2018-01-01 | End: 2018-01-01 | Stop reason: HOSPADM

## 2018-01-01 RX ORDER — VANCOMYCIN 1.75 GRAM/500 ML IN 0.9 % SODIUM CHLORIDE INTRAVENOUS
1750 ONCE
Status: COMPLETED | OUTPATIENT
Start: 2018-01-01 | End: 2018-01-01

## 2018-01-01 RX ORDER — SODIUM CHLORIDE 0.9 % (FLUSH) 0.9 %
10 SYRINGE (ML) INJECTION
Status: COMPLETED | OUTPATIENT
Start: 2018-01-01 | End: 2018-01-01

## 2018-01-01 RX ORDER — IPRATROPIUM BROMIDE AND ALBUTEROL SULFATE 2.5; .5 MG/3ML; MG/3ML
SOLUTION RESPIRATORY (INHALATION)
Status: COMPLETED
Start: 2018-01-01 | End: 2018-01-01

## 2018-01-01 RX ORDER — IPRATROPIUM BROMIDE AND ALBUTEROL SULFATE 2.5; .5 MG/3ML; MG/3ML
3 SOLUTION RESPIRATORY (INHALATION)
Qty: 30 NEBULE | Refills: 2 | Status: SHIPPED | OUTPATIENT
Start: 2018-01-01 | End: 2018-01-01

## 2018-01-01 RX ORDER — HYDRALAZINE HYDROCHLORIDE 20 MG/ML
20 INJECTION INTRAMUSCULAR; INTRAVENOUS
Status: DISCONTINUED | OUTPATIENT
Start: 2018-01-01 | End: 2018-01-01 | Stop reason: HOSPADM

## 2018-01-01 RX ORDER — SODIUM BICARBONATE 1 MEQ/ML
SYRINGE (ML) INTRAVENOUS
Status: COMPLETED | OUTPATIENT
Start: 2018-01-01 | End: 2018-01-01

## 2018-01-01 RX ORDER — IPRATROPIUM BROMIDE AND ALBUTEROL SULFATE 2.5; .5 MG/3ML; MG/3ML
3 SOLUTION RESPIRATORY (INHALATION)
Qty: 30 NEBULE | Refills: 2 | Status: SHIPPED | OUTPATIENT
Start: 2018-01-01 | End: 2018-01-01 | Stop reason: SDUPTHER

## 2018-01-01 RX ORDER — LISINOPRIL 20 MG/1
20 TABLET ORAL DAILY
Qty: 30 TAB | Refills: 2 | Status: SHIPPED | OUTPATIENT
Start: 2018-01-01 | End: 2018-01-01 | Stop reason: SDUPTHER

## 2018-01-01 RX ORDER — CALCIUM CHLORIDE INJECTION 100 MG/ML
INJECTION, SOLUTION INTRAVENOUS
Status: COMPLETED | OUTPATIENT
Start: 2018-01-01 | End: 2018-01-01

## 2018-01-01 RX ORDER — PREDNISONE 20 MG/1
40 TABLET ORAL
Status: DISCONTINUED | OUTPATIENT
Start: 2018-01-01 | End: 2018-01-01

## 2018-01-01 RX ORDER — ALBUTEROL SULFATE 0.83 MG/ML
1.25 SOLUTION RESPIRATORY (INHALATION)
Status: DISCONTINUED | OUTPATIENT
Start: 2018-01-01 | End: 2018-01-01 | Stop reason: HOSPADM

## 2018-01-01 RX ORDER — EPINEPHRINE 0.1 MG/ML
INJECTION INTRACARDIAC; INTRAVENOUS
Status: COMPLETED | OUTPATIENT
Start: 2018-01-01 | End: 2018-01-01

## 2018-01-01 RX ORDER — ACETAMINOPHEN 325 MG/1
650 TABLET ORAL
Status: DISCONTINUED | OUTPATIENT
Start: 2018-01-01 | End: 2018-01-01 | Stop reason: HOSPADM

## 2018-01-01 RX ORDER — LEVOTHYROXINE SODIUM 75 UG/1
75 TABLET ORAL
Status: DISCONTINUED | OUTPATIENT
Start: 2018-01-01 | End: 2018-01-01 | Stop reason: HOSPADM

## 2018-01-01 RX ORDER — PREDNISONE 20 MG/1
20 TABLET ORAL
Status: DISCONTINUED | OUTPATIENT
Start: 2018-01-01 | End: 2018-01-01

## 2018-01-01 RX ORDER — LISINOPRIL 20 MG/1
20 TABLET ORAL DAILY
Status: DISCONTINUED | OUTPATIENT
Start: 2018-01-01 | End: 2018-01-01 | Stop reason: HOSPADM

## 2018-01-01 RX ORDER — AMLODIPINE BESYLATE 5 MG/1
5 TABLET ORAL DAILY
Qty: 30 TAB | Refills: 2 | Status: SHIPPED | OUTPATIENT
Start: 2018-01-01 | End: 2018-01-01 | Stop reason: SDUPTHER

## 2018-01-01 RX ORDER — PREDNISONE 20 MG/1
TABLET ORAL
Qty: 11 TAB | Refills: 0 | Status: SHIPPED | OUTPATIENT
Start: 2018-01-01 | End: 2018-01-01

## 2018-01-01 RX ORDER — METOPROLOL TARTRATE 50 MG/1
50 TABLET ORAL EVERY 12 HOURS
Status: DISCONTINUED | OUTPATIENT
Start: 2018-01-01 | End: 2018-01-01 | Stop reason: HOSPADM

## 2018-01-01 RX ORDER — PREDNISONE 20 MG/1
60 TABLET ORAL
Status: DISCONTINUED | OUTPATIENT
Start: 2018-01-01 | End: 2018-01-01 | Stop reason: HOSPADM

## 2018-01-01 RX ORDER — FUROSEMIDE 40 MG/1
40 TABLET ORAL DAILY
Status: DISCONTINUED | OUTPATIENT
Start: 2018-01-01 | End: 2018-01-01 | Stop reason: HOSPADM

## 2018-01-01 RX ORDER — ALBUTEROL SULFATE 0.83 MG/ML
2.5 SOLUTION RESPIRATORY (INHALATION)
Status: DISCONTINUED | OUTPATIENT
Start: 2018-01-01 | End: 2018-01-01

## 2018-01-01 RX ORDER — HEPARIN SODIUM 5000 [USP'U]/ML
5000 INJECTION, SOLUTION INTRAVENOUS; SUBCUTANEOUS EVERY 8 HOURS
Status: DISCONTINUED | OUTPATIENT
Start: 2018-01-01 | End: 2018-01-01 | Stop reason: HOSPADM

## 2018-01-01 RX ORDER — PREDNISONE 20 MG/1
40 TABLET ORAL
Qty: 3 TAB | Refills: 0 | Status: SHIPPED | OUTPATIENT
Start: 2018-01-01 | End: 2018-01-01

## 2018-01-01 RX ORDER — PREDNISONE 20 MG/1
TABLET ORAL
Qty: 11 TAB | Refills: 0 | Status: SHIPPED | OUTPATIENT
Start: 2018-01-01 | End: 2018-01-01 | Stop reason: SDUPTHER

## 2018-01-01 RX ORDER — SODIUM CHLORIDE 0.9 % (FLUSH) 0.9 %
5-10 SYRINGE (ML) INJECTION AS NEEDED
Status: DISCONTINUED | OUTPATIENT
Start: 2018-01-01 | End: 2018-01-01 | Stop reason: HOSPADM

## 2018-01-01 RX ORDER — AMOXICILLIN 250 MG
1 CAPSULE ORAL DAILY PRN
Status: DISCONTINUED | OUTPATIENT
Start: 2018-01-01 | End: 2018-01-01 | Stop reason: HOSPADM

## 2018-01-01 RX ORDER — CARVEDILOL 3.12 MG/1
3.12 TABLET ORAL 2 TIMES DAILY WITH MEALS
Status: DISCONTINUED | OUTPATIENT
Start: 2018-01-01 | End: 2018-01-01 | Stop reason: HOSPADM

## 2018-01-01 RX ORDER — LISINOPRIL 20 MG/1
10 TABLET ORAL DAILY
Status: DISCONTINUED | OUTPATIENT
Start: 2018-01-01 | End: 2018-01-01 | Stop reason: HOSPADM

## 2018-01-01 RX ORDER — LISINOPRIL 20 MG/1
20 TABLET ORAL DAILY
Qty: 30 TAB | Refills: 2 | Status: SHIPPED | OUTPATIENT
Start: 2018-01-01 | End: 2018-01-01

## 2018-01-01 RX ORDER — PREDNISONE 20 MG/1
40 TABLET ORAL
Status: DISCONTINUED | OUTPATIENT
Start: 2018-01-01 | End: 2018-01-01 | Stop reason: HOSPADM

## 2018-01-01 RX ORDER — FUROSEMIDE 40 MG/1
40 TABLET ORAL
Status: DISCONTINUED | OUTPATIENT
Start: 2018-01-01 | End: 2018-01-01 | Stop reason: HOSPADM

## 2018-01-01 RX ORDER — AZITHROMYCIN 250 MG/1
500 TABLET, FILM COATED ORAL DAILY
Status: DISCONTINUED | OUTPATIENT
Start: 2018-01-01 | End: 2018-01-01 | Stop reason: HOSPADM

## 2018-01-01 RX ORDER — IPRATROPIUM BROMIDE 0.5 MG/2.5ML
0.5 SOLUTION RESPIRATORY (INHALATION)
Status: DISCONTINUED | OUTPATIENT
Start: 2018-01-01 | End: 2018-01-01

## 2018-01-01 RX ADMIN — Medication 400 MG: at 17:08

## 2018-01-01 RX ADMIN — FUROSEMIDE 40 MG: 40 TABLET ORAL at 15:33

## 2018-01-01 RX ADMIN — SODIUM CHLORIDE 1000 ML: 900 INJECTION, SOLUTION INTRAVENOUS at 19:50

## 2018-01-01 RX ADMIN — BUDESONIDE 500 MCG: 0.5 INHALANT RESPIRATORY (INHALATION) at 20:37

## 2018-01-01 RX ADMIN — IPRATROPIUM BROMIDE AND ALBUTEROL SULFATE 3 ML: .5; 3 SOLUTION RESPIRATORY (INHALATION) at 19:35

## 2018-01-01 RX ADMIN — AZITHROMYCIN 500 MG: 250 TABLET, FILM COATED ORAL at 08:23

## 2018-01-01 RX ADMIN — Medication 400 MG: at 10:14

## 2018-01-01 RX ADMIN — IPRATROPIUM BROMIDE AND ALBUTEROL SULFATE 3 ML: .5; 3 SOLUTION RESPIRATORY (INHALATION) at 07:40

## 2018-01-01 RX ADMIN — PREDNISONE 40 MG: 20 TABLET ORAL at 08:01

## 2018-01-01 RX ADMIN — IPRATROPIUM BROMIDE AND ALBUTEROL SULFATE 3 ML: 2.5; .5 SOLUTION RESPIRATORY (INHALATION) at 07:52

## 2018-01-01 RX ADMIN — HEPARIN SODIUM 5000 UNITS: 5000 INJECTION INTRAVENOUS; SUBCUTANEOUS at 21:46

## 2018-01-01 RX ADMIN — HEPARIN SODIUM 5000 UNITS: 5000 INJECTION INTRAVENOUS; SUBCUTANEOUS at 06:08

## 2018-01-01 RX ADMIN — Medication 400 MG: at 09:16

## 2018-01-01 RX ADMIN — Medication: at 09:18

## 2018-01-01 RX ADMIN — BUDESONIDE 500 MCG: 0.5 INHALANT RESPIRATORY (INHALATION) at 20:05

## 2018-01-01 RX ADMIN — AZITHROMYCIN 500 MG: 250 TABLET, FILM COATED ORAL at 09:20

## 2018-01-01 RX ADMIN — PREDNISONE 40 MG: 20 TABLET ORAL at 23:48

## 2018-01-01 RX ADMIN — BUDESONIDE 500 MCG: 0.5 INHALANT RESPIRATORY (INHALATION) at 08:35

## 2018-01-01 RX ADMIN — Medication: at 21:35

## 2018-01-01 RX ADMIN — FUROSEMIDE 40 MG: 40 TABLET ORAL at 10:13

## 2018-01-01 RX ADMIN — IPRATROPIUM BROMIDE AND ALBUTEROL SULFATE 3 ML: 2.5; .5 SOLUTION RESPIRATORY (INHALATION) at 15:06

## 2018-01-01 RX ADMIN — PREDNISONE 40 MG: 20 TABLET ORAL at 10:17

## 2018-01-01 RX ADMIN — EPINEPHRINE 1 MG: 0.1 INJECTION, SOLUTION ENDOTRACHEAL; INTRACARDIAC; INTRAVENOUS at 18:24

## 2018-01-01 RX ADMIN — IPRATROPIUM BROMIDE AND ALBUTEROL SULFATE 3 ML: .5; 3 SOLUTION RESPIRATORY (INHALATION) at 02:17

## 2018-01-01 RX ADMIN — IPRATROPIUM BROMIDE AND ALBUTEROL SULFATE 3 ML: .5; 3 SOLUTION RESPIRATORY (INHALATION) at 14:50

## 2018-01-01 RX ADMIN — ACETAMINOPHEN 650 MG: 325 TABLET ORAL at 09:58

## 2018-01-01 RX ADMIN — SODIUM BICARBONATE 50 MEQ: 84 INJECTION, SOLUTION INTRAVENOUS at 18:10

## 2018-01-01 RX ADMIN — HEPARIN SODIUM 5000 UNITS: 5000 INJECTION, SOLUTION INTRAVENOUS; SUBCUTANEOUS at 05:51

## 2018-01-01 RX ADMIN — IPRATROPIUM BROMIDE AND ALBUTEROL SULFATE 3 ML: .5; 3 SOLUTION RESPIRATORY (INHALATION) at 14:49

## 2018-01-01 RX ADMIN — Medication 10 ML: at 13:45

## 2018-01-01 RX ADMIN — IPRATROPIUM BROMIDE AND ALBUTEROL SULFATE 3 ML: .5; 3 SOLUTION RESPIRATORY (INHALATION) at 10:56

## 2018-01-01 RX ADMIN — EPINEPHRINE 1 MG: 0.1 INJECTION, SOLUTION ENDOTRACHEAL; INTRACARDIAC; INTRAVENOUS at 18:11

## 2018-01-01 RX ADMIN — LISINOPRIL 10 MG: 20 TABLET ORAL at 09:20

## 2018-01-01 RX ADMIN — HEPARIN SODIUM 5000 UNITS: 5000 INJECTION INTRAVENOUS; SUBCUTANEOUS at 21:31

## 2018-01-01 RX ADMIN — Medication 5 ML: at 05:19

## 2018-01-01 RX ADMIN — HEPARIN SODIUM 5000 UNITS: 5000 INJECTION, SOLUTION INTRAVENOUS; SUBCUTANEOUS at 05:18

## 2018-01-01 RX ADMIN — SODIUM CHLORIDE 100 ML: 900 INJECTION, SOLUTION INTRAVENOUS at 23:25

## 2018-01-01 RX ADMIN — Medication 5 ML: at 05:07

## 2018-01-01 RX ADMIN — IPRATROPIUM BROMIDE AND ALBUTEROL SULFATE 3 ML: 2.5; .5 SOLUTION RESPIRATORY (INHALATION) at 23:29

## 2018-01-01 RX ADMIN — METOPROLOL TARTRATE 50 MG: 50 TABLET ORAL at 08:23

## 2018-01-01 RX ADMIN — Medication 10 ML: at 23:25

## 2018-01-01 RX ADMIN — Medication: at 21:32

## 2018-01-01 RX ADMIN — FUROSEMIDE 40 MG: 40 TABLET ORAL at 08:23

## 2018-01-01 RX ADMIN — SODIUM CHLORIDE 1000 ML: 900 INJECTION, SOLUTION INTRAVENOUS at 05:05

## 2018-01-01 RX ADMIN — OXYMETAZOLINE HYDROCHLORIDE 2 SPRAY: 5 SPRAY NASAL at 19:50

## 2018-01-01 RX ADMIN — LISINOPRIL 20 MG: 20 TABLET ORAL at 09:58

## 2018-01-01 RX ADMIN — FUROSEMIDE 40 MG: 40 TABLET ORAL at 16:10

## 2018-01-01 RX ADMIN — BUDESONIDE 500 MCG: 0.5 INHALANT RESPIRATORY (INHALATION) at 19:35

## 2018-01-01 RX ADMIN — METOPROLOL TARTRATE 50 MG: 50 TABLET ORAL at 10:14

## 2018-01-01 RX ADMIN — Medication 10 ML: at 21:47

## 2018-01-01 RX ADMIN — ASPIRIN 81 MG: 81 TABLET, COATED ORAL at 10:15

## 2018-01-01 RX ADMIN — IPRATROPIUM BROMIDE AND ALBUTEROL SULFATE 3 ML: .5; 3 SOLUTION RESPIRATORY (INHALATION) at 14:40

## 2018-01-01 RX ADMIN — AZITHROMYCIN 500 MG: 250 TABLET, FILM COATED ORAL at 23:48

## 2018-01-01 RX ADMIN — HEPARIN SODIUM 5000 UNITS: 5000 INJECTION INTRAVENOUS; SUBCUTANEOUS at 05:24

## 2018-01-01 RX ADMIN — Medication 10 ML: at 21:13

## 2018-01-01 RX ADMIN — LISINOPRIL 20 MG: 20 TABLET ORAL at 08:00

## 2018-01-01 RX ADMIN — ASPIRIN 81 MG: 81 TABLET, COATED ORAL at 08:22

## 2018-01-01 RX ADMIN — ASPIRIN 81 MG: 81 TABLET, COATED ORAL at 08:01

## 2018-01-01 RX ADMIN — LISINOPRIL 20 MG: 20 TABLET ORAL at 09:16

## 2018-01-01 RX ADMIN — BUDESONIDE 500 MCG: 0.5 INHALANT RESPIRATORY (INHALATION) at 08:01

## 2018-01-01 RX ADMIN — CARVEDILOL 3.12 MG: 3.12 TABLET, FILM COATED ORAL at 09:16

## 2018-01-01 RX ADMIN — IPRATROPIUM BROMIDE AND ALBUTEROL SULFATE 3 ML: 2.5; .5 SOLUTION RESPIRATORY (INHALATION) at 22:23

## 2018-01-01 RX ADMIN — HEPARIN SODIUM 5000 UNITS: 5000 INJECTION INTRAVENOUS; SUBCUTANEOUS at 21:33

## 2018-01-01 RX ADMIN — Medication 10 ML: at 09:59

## 2018-01-01 RX ADMIN — ALBUTEROL SULFATE 5 MG: 2.5 SOLUTION RESPIRATORY (INHALATION) at 17:21

## 2018-01-01 RX ADMIN — CARVEDILOL 3.12 MG: 3.12 TABLET, FILM COATED ORAL at 17:03

## 2018-01-01 RX ADMIN — Medication 5 ML: at 21:36

## 2018-01-01 RX ADMIN — IPRATROPIUM BROMIDE AND ALBUTEROL SULFATE 3 ML: .5; 3 SOLUTION RESPIRATORY (INHALATION) at 13:21

## 2018-01-01 RX ADMIN — IPRATROPIUM BROMIDE AND ALBUTEROL SULFATE 3 ML: .5; 3 SOLUTION RESPIRATORY (INHALATION) at 02:00

## 2018-01-01 RX ADMIN — CALCIUM CHLORIDE 1 G: 100 INJECTION, SOLUTION INTRAVENOUS; INTRAVENTRICULAR at 18:38

## 2018-01-01 RX ADMIN — ASPIRIN 81 MG: 81 TABLET, COATED ORAL at 09:58

## 2018-01-01 RX ADMIN — IPRATROPIUM BROMIDE AND ALBUTEROL SULFATE 3 ML: .5; 3 SOLUTION RESPIRATORY (INHALATION) at 02:50

## 2018-01-01 RX ADMIN — FUROSEMIDE 40 MG: 40 TABLET ORAL at 10:17

## 2018-01-01 RX ADMIN — RACEPINEPHRINE HYDROCHLORIDE: 11.25 SOLUTION RESPIRATORY (INHALATION) at 18:24

## 2018-01-01 RX ADMIN — Medication 10 ML: at 21:33

## 2018-01-01 RX ADMIN — ASPIRIN 81 MG: 81 TABLET, COATED ORAL at 09:19

## 2018-01-01 RX ADMIN — Medication 400 MG: at 16:10

## 2018-01-01 RX ADMIN — CARVEDILOL 3.12 MG: 3.12 TABLET, FILM COATED ORAL at 08:01

## 2018-01-01 RX ADMIN — HEPARIN SODIUM 5000 UNITS: 5000 INJECTION INTRAVENOUS; SUBCUTANEOUS at 06:25

## 2018-01-01 RX ADMIN — LEVOTHYROXINE SODIUM 75 MCG: 75 TABLET ORAL at 05:24

## 2018-01-01 RX ADMIN — BUDESONIDE 500 MCG: 0.5 INHALANT RESPIRATORY (INHALATION) at 19:49

## 2018-01-01 RX ADMIN — BUDESONIDE 500 MCG: 0.5 INHALANT RESPIRATORY (INHALATION) at 07:52

## 2018-01-01 RX ADMIN — Medication 400 MG: at 08:00

## 2018-01-01 RX ADMIN — CARVEDILOL 3.12 MG: 3.12 TABLET, FILM COATED ORAL at 16:11

## 2018-01-01 RX ADMIN — IPRATROPIUM BROMIDE AND ALBUTEROL SULFATE 3 ML: .5; 3 SOLUTION RESPIRATORY (INHALATION) at 07:10

## 2018-01-01 RX ADMIN — HEPARIN SODIUM 5000 UNITS: 5000 INJECTION, SOLUTION INTRAVENOUS; SUBCUTANEOUS at 23:41

## 2018-01-01 RX ADMIN — IPRATROPIUM BROMIDE AND ALBUTEROL SULFATE 3 ML: 2.5; .5 SOLUTION RESPIRATORY (INHALATION) at 16:28

## 2018-01-01 RX ADMIN — HEPARIN SODIUM 5000 UNITS: 5000 INJECTION INTRAVENOUS; SUBCUTANEOUS at 13:38

## 2018-01-01 RX ADMIN — LEVOTHYROXINE SODIUM 75 MCG: 75 TABLET ORAL at 06:08

## 2018-01-01 RX ADMIN — IPRATROPIUM BROMIDE AND ALBUTEROL SULFATE 3 ML: .5; 3 SOLUTION RESPIRATORY (INHALATION) at 19:49

## 2018-01-01 RX ADMIN — SODIUM BICARBONATE 50 MEQ: 84 INJECTION, SOLUTION INTRAVENOUS at 18:38

## 2018-01-01 RX ADMIN — Medication 400 MG: at 08:23

## 2018-01-01 RX ADMIN — CALCIUM CHLORIDE 1 G: 100 INJECTION, SOLUTION INTRAVENOUS; INTRAVENTRICULAR at 18:14

## 2018-01-01 RX ADMIN — PREDNISONE 40 MG: 20 TABLET ORAL at 09:16

## 2018-01-01 RX ADMIN — BUDESONIDE 500 MCG: 0.5 INHALANT RESPIRATORY (INHALATION) at 07:40

## 2018-01-01 RX ADMIN — LEVOTHYROXINE SODIUM 75 MCG: 75 TABLET ORAL at 08:00

## 2018-01-01 RX ADMIN — FUROSEMIDE 40 MG: 10 INJECTION, SOLUTION INTRAMUSCULAR; INTRAVENOUS at 23:41

## 2018-01-01 RX ADMIN — IPRATROPIUM BROMIDE AND ALBUTEROL SULFATE 3 ML: 2.5; .5 SOLUTION RESPIRATORY (INHALATION) at 20:37

## 2018-01-01 RX ADMIN — HEPARIN SODIUM 5000 UNITS: 5000 INJECTION, SOLUTION INTRAVENOUS; SUBCUTANEOUS at 13:05

## 2018-01-01 RX ADMIN — DILTIAZEM HYDROCHLORIDE 120 MG: 120 CAPSULE, COATED, EXTENDED RELEASE ORAL at 08:23

## 2018-01-01 RX ADMIN — METOPROLOL TARTRATE 50 MG: 50 TABLET ORAL at 21:54

## 2018-01-01 RX ADMIN — Medication 400 MG: at 17:34

## 2018-01-01 RX ADMIN — METHYLPREDNISOLONE SODIUM SUCCINATE 40 MG: 40 INJECTION, POWDER, FOR SOLUTION INTRAMUSCULAR; INTRAVENOUS at 12:59

## 2018-01-01 RX ADMIN — LEVOTHYROXINE SODIUM 75 MCG: 75 TABLET ORAL at 06:23

## 2018-01-01 RX ADMIN — PREDNISONE 40 MG: 20 TABLET ORAL at 08:23

## 2018-01-01 RX ADMIN — IPRATROPIUM BROMIDE AND ALBUTEROL SULFATE 3 ML: .5; 3 SOLUTION RESPIRATORY (INHALATION) at 20:05

## 2018-01-01 RX ADMIN — IPRATROPIUM BROMIDE AND ALBUTEROL SULFATE 3 ML: 2.5; .5 SOLUTION RESPIRATORY (INHALATION) at 03:43

## 2018-01-01 RX ADMIN — LEVOTHYROXINE SODIUM 75 MCG: 75 TABLET ORAL at 09:20

## 2018-01-01 RX ADMIN — DILTIAZEM HYDROCHLORIDE 120 MG: 120 CAPSULE, COATED, EXTENDED RELEASE ORAL at 10:15

## 2018-01-01 RX ADMIN — HEPARIN SODIUM 5000 UNITS: 5000 INJECTION INTRAVENOUS; SUBCUTANEOUS at 13:46

## 2018-01-01 RX ADMIN — HEPARIN SODIUM 5000 UNITS: 5000 INJECTION, SOLUTION INTRAVENOUS; SUBCUTANEOUS at 10:12

## 2018-01-01 RX ADMIN — IOPAMIDOL 80 ML: 755 INJECTION, SOLUTION INTRAVENOUS at 23:25

## 2018-01-01 RX ADMIN — Medication 5 ML: at 13:39

## 2018-01-01 RX ADMIN — Medication 10 ML: at 21:55

## 2018-01-01 RX ADMIN — IPRATROPIUM BROMIDE AND ALBUTEROL SULFATE 3 ML: 2.5; .5 SOLUTION RESPIRATORY (INHALATION) at 23:20

## 2018-01-01 RX ADMIN — Medication 400 MG: at 17:25

## 2018-01-01 RX ADMIN — HEPARIN SODIUM 5000 UNITS: 5000 INJECTION, SOLUTION INTRAVENOUS; SUBCUTANEOUS at 21:13

## 2018-01-01 RX ADMIN — IPRATROPIUM BROMIDE AND ALBUTEROL SULFATE 3 ML: 2.5; .5 SOLUTION RESPIRATORY (INHALATION) at 11:24

## 2018-01-01 RX ADMIN — PERFLUTREN 1 ML: 6.52 INJECTION, SUSPENSION INTRAVENOUS at 13:00

## 2018-01-01 RX ADMIN — EPINEPHRINE 1 MG: 0.1 INJECTION, SOLUTION ENDOTRACHEAL; INTRACARDIAC; INTRAVENOUS at 18:37

## 2018-01-01 RX ADMIN — BARIUM SULFATE 45 ML: 980 POWDER, FOR SUSPENSION ORAL at 09:38

## 2018-01-01 RX ADMIN — LEVOTHYROXINE SODIUM 75 MCG: 75 TABLET ORAL at 08:23

## 2018-01-01 RX ADMIN — HEPARIN SODIUM 5000 UNITS: 5000 INJECTION, SOLUTION INTRAVENOUS; SUBCUTANEOUS at 21:53

## 2018-01-01 RX ADMIN — Medication 400 MG: at 17:03

## 2018-01-01 RX ADMIN — FUROSEMIDE 40 MG: 40 TABLET ORAL at 06:08

## 2018-01-01 RX ADMIN — Medication 400 MG: at 09:20

## 2018-01-01 RX ADMIN — Medication: at 08:01

## 2018-01-01 RX ADMIN — Medication 10 ML: at 06:23

## 2018-01-01 RX ADMIN — METOPROLOL TARTRATE 50 MG: 50 TABLET ORAL at 21:13

## 2018-01-01 RX ADMIN — ASPIRIN 81 MG: 81 TABLET, COATED ORAL at 09:15

## 2018-01-01 RX ADMIN — BUDESONIDE 500 MCG: 0.5 INHALANT RESPIRATORY (INHALATION) at 10:56

## 2018-01-01 RX ADMIN — Medication 10 ML: at 13:47

## 2018-01-01 RX ADMIN — Medication 400 MG: at 09:58

## 2018-01-01 RX ADMIN — LISINOPRIL 20 MG: 20 TABLET ORAL at 08:01

## 2018-01-01 RX ADMIN — Medication: at 08:06

## 2018-01-01 RX ADMIN — Medication 10 ML: at 05:24

## 2018-01-01 RX ADMIN — PREDNISONE 60 MG: 20 TABLET ORAL at 09:19

## 2018-01-01 RX ADMIN — FUROSEMIDE 40 MG: 40 TABLET ORAL at 09:20

## 2018-01-01 RX ADMIN — LISINOPRIL 10 MG: 20 TABLET ORAL at 12:59

## 2018-01-01 RX ADMIN — BUDESONIDE 500 MCG: 0.5 INHALANT RESPIRATORY (INHALATION) at 07:10

## 2018-01-01 RX ADMIN — CARVEDILOL 3.12 MG: 3.12 TABLET, FILM COATED ORAL at 08:00

## 2018-01-01 RX ADMIN — FUROSEMIDE 40 MG: 40 TABLET ORAL at 06:23

## 2018-01-01 RX ADMIN — HEPARIN SODIUM 5000 UNITS: 5000 INJECTION INTRAVENOUS; SUBCUTANEOUS at 13:45

## 2018-01-01 RX ADMIN — BARIUM SULFATE 15 ML: 400 PASTE ORAL at 09:35

## 2018-01-01 RX ADMIN — BUDESONIDE 500 MCG: 0.5 INHALANT RESPIRATORY (INHALATION) at 23:20

## 2018-01-01 RX ADMIN — PREDNISONE 40 MG: 20 TABLET ORAL at 08:00

## 2018-01-01 RX ADMIN — METOPROLOL TARTRATE 50 MG: 50 TABLET ORAL at 23:48

## 2018-01-01 RX ADMIN — Medication 5 ML: at 06:08

## 2018-01-01 RX ADMIN — IPRATROPIUM BROMIDE AND ALBUTEROL SULFATE 3 ML: 2.5; .5 SOLUTION RESPIRATORY (INHALATION) at 08:35

## 2018-01-01 RX ADMIN — ASPIRIN 81 MG: 81 TABLET, COATED ORAL at 08:00

## 2018-01-01 RX ADMIN — Medication 400 MG: at 08:01

## 2018-01-01 RX ADMIN — Medication: at 21:00

## 2018-01-01 RX ADMIN — IPRATROPIUM BROMIDE AND ALBUTEROL SULFATE 3 ML: .5; 3 SOLUTION RESPIRATORY (INHALATION) at 08:01

## 2018-01-01 RX ADMIN — VANCOMYCIN HYDROCHLORIDE 1750 MG: 10 INJECTION, POWDER, LYOPHILIZED, FOR SOLUTION INTRAVENOUS at 05:36

## 2018-01-01 RX ADMIN — DILTIAZEM HYDROCHLORIDE 120 MG: 120 CAPSULE, COATED, EXTENDED RELEASE ORAL at 09:20

## 2018-01-01 RX ADMIN — METOPROLOL TARTRATE 50 MG: 50 TABLET ORAL at 09:20

## 2018-01-05 NOTE — PROGRESS NOTES
This note will not be viewable in [address] FOLLOW UP    Follow up call made. CG reports nursing services to start \"this week\" and will contact PCP's office to schedule follow up as office has not contacted her yet. No other needs identified. Will schedule final outreach call.

## 2018-01-12 NOTE — TELEPHONE ENCOUNTER
I spoke with caregiver Katia Silveira. She said Ms. John Flores was doing really well. PT just left and Mrs. John Flores is getting stronger. No medication additions or changes. No questions at this time. She has my contact information.

## 2018-01-17 PROBLEM — J06.9 URI (UPPER RESPIRATORY INFECTION): Status: ACTIVE | Noted: 2018-01-01

## 2018-01-17 PROBLEM — R77.8 ELEVATED TROPONIN: Status: RESOLVED | Noted: 2017-01-01 | Resolved: 2018-01-01

## 2018-01-17 PROBLEM — R09.02 HYPOXIA: Status: ACTIVE | Noted: 2018-01-01

## 2018-01-17 PROBLEM — A41.9 SEPSIS (HCC): Status: RESOLVED | Noted: 2017-01-01 | Resolved: 2018-01-01

## 2018-01-17 NOTE — IP AVS SNAPSHOT
Summary of Care Report The Summary of Care report has been created to help improve care coordination. Users with access to [employer] or [employer] (Web-based application) may access additional patient information including the Discharge Summary. If you are not currently a [email or web address] user and need more information, please call the number listed below in the [place] [address] section and ask to be connected with Medical Records. Facility Information Name Address Phone [id number] [place] [address] [address] [place] [id number] [phone] Patient Information Patient Name Sex  Karen Best ([id number]) Female 10/30/1924 Discharge Information Admitting Provider Service Area Unit Asia Ramsey MD / [place] [address] Med Surg / [phone] Discharge Provider Discharge Date/Time Discharge Disposition Destination Asia Ramsey MD / [phone] 2018 (Pending) [facility] (none) Patient Language Language ENGLISH [13] Hospital Problems as of 2018  Reviewed: 2017 10:36 AM by Aura Reaves MD  
  
  
  
 Class Noted - Resolved Last Modified POA Active Problems Mild persistent asthma without complication (Chronic)   - Present 2018 by Mateusz Melendez MD Yes Entered by Elder Cosme MD  
  Paroxysmal atrial fibrillation [facility]) (Chronic)  2017 - Present 2018 by Mateusz Melendez MD Yes Entered by Aura Reaves MD  
  Chronic diastolic congestive heart failure [facility]) (Chronic)  2017 - Present 2018 by Mateusz Melendez MD Yes Entered by Aura Reaves MD  
  Stage 3 chronic kidney disease (Chronic)  2017 - Present 2018 by Mateusz Melendez MD Yes   Entered by Aura Reaves MD  
  Pulmonary fibrosis [facility]) (Chronic)  2017 - Present 2018 by Noah Shi MD Yes Entered by Mp Arevalo MD  
  * (Principal)Acute on chronic respiratory failure with hypoxia ([facility] [address]  1/17/2018 - Present 1/19/2018 by Noah Shi MD Yes Entered by Jannie Worley MD  
  URI (upper respiratory infection)  1/17/2018 - Present 1/17/2018 by Jannie Worley MD Yes Entered by Jannie Worley MD  
  Oropharyngeal dysphagia (Chronic)  1/19/2018 - Present 1/19/2018 by Noah Shi MD Yes Entered by Noah Shi MD  
  Overview Signed 1/19/2018  8:15 AM by Noah Shi MD  
   Per SLP 1/18/18 modified diet, mechanical soft with nectar thickened liquids Non-Hospital Problems as of 1/20/2018  Reviewed: 12/25/2017 10:36 AM by Mp Arevalo MD  
  
  
  
 Class Noted - Resolved Last Modified Active Problems Rheumatoid arthritis ([facility] [address]  11/13/2014 - Present 11/13/2017 by Bubba Munson MD  
  Entered by Tonja Nettles MD  
  IPMN (intraductal papillary mucinous neoplasm)  12/12/2014 - Present 12/12/2014 by Fanny Marrero MD  
  Entered by Fanny Marrero MD  
  Urge incontinence  10/28/2016 - Present 10/28/2016 by Desiree Jonse Entered by Desiree Jones Nocturnal enuresis  10/28/2016 - Present 10/28/2016 by Desiree Jones Entered by Desiree Jones Bronchitis  12/23/2017 - Present 12/23/2017 by Mp Arevalo MD  
  Entered by Mp Arevalo MD  
  
You are allergic to the following Allergen Reactions Methotrexate Other (comments) Damaged kidney and liver Sulfa (Sulfonamide Antibiotics) Hives Current Discharge Medication List  
  
START taking these medications Dose & Instructions Dispensing Information Comments  
 lisinopril 20 mg tablet Commonly known as:  Rod Kapadiaerin Dose:  20 mg Take 1 Tab by mouth daily. Quantity:  30 Tab Refills:  2  
   
 predniSONE 20 mg tablet Commonly known as:  Millicent Bellamy  
 Take 60mg daily x2d, then 40mg qday x2d, then 20mg qday x1d, then stop. Quantity:  11 Tab Refills:  0 CONTINUE these medications which have CHANGED Dose & Instructions Dispensing Information Comments  
 albuterol-ipratropium 2.5 mg-0.5 mg/3 ml Nebu Commonly known as:  Rmaona Saavedra What changed:  See the new instructions. Dose:  3 mL  
3 mL by Nebulization route every six (6) hours as needed. Quantity:  30 Nebule Refills:  2 CONTINUE these medications which have NOT CHANGED Dose & Instructions Dispensing Information Comments  
 aspirin delayed-release 81 mg tablet Dose:  81 mg Take 1 Tab by mouth daily. Quantity:  30 Tab Refills:  0  
   
 azithromycin 500 mg Tab Commonly known as:  Mely Gola Dose:  500 mg Take 1 Tab by mouth daily. Quantity:  3 Tab Refills:  0  
   
 budesonide-formoterol 160-4.5 mcg/actuation Hfaa Commonly known as:  SYMBICORT Dose:  2 Puff Take 2 Puffs by inhalation two (2) times a day. Quantity:  3 Inhaler Refills:  3  
   
 dilTIAZem  mg ER capsule Commonly known as:  CARDIZEM CD Dose:  120 mg Take 1 Cap by mouth daily. Quantity:  30 Cap Refills:  0  
   
 furosemide 40 mg tablet Commonly known as:  LASIX Dose:  40 mg Take 1 Tab by mouth daily. Quantity:  30 Tab Refills:  0  
   
 glucosamine sulfate 500 mg tablet Commonly known as:  GLUCOSAMINE Dose:  500 mg Take 1 Tab by mouth two (2) times a day. Quantity:  60 Tab Refills:  0  
   
 levothyroxine 75 mcg tablet Commonly known as:  SYNTHROID  
 TAKE 1 TABLET BY MOUTH  DAILY BEFORE BREAKFAST Quantity:  90 Tab Refills:  0  
   
 magnesium oxide 400 mg tablet Commonly known as:  MAG-OX Dose:  400 mg Take 1 Tab by mouth two (2) times a day. Quantity:  60 Tab Refills:  0  
   
 metoprolol tartrate 50 mg tablet Commonly known as:  LOPRESSOR Dose:  50 mg Take 1 Tab by mouth every twelve (12) hours. Quantity:  60 Tab Refills:  0  
   
 mirabegron ER 25 mg ER tablet Commonly known as:  MYRBETRIQ Dose:  25 mg Take 1 Tab by mouth daily. Quantity:  90 Tab Refills:  3 STOP taking these medications Comments  
 acetaminophen 325 mg tablet Commonly known as:  TYLENOL Nebulizer & Compressor machine Nebulizer Accessories Kit Current Immunizations Name Date Influenza High Dose Vaccine PF 10/9/2017, 11/17/2016 Influenza Vaccine 11/19/2007 Pneumococcal Conjugate (PCV-13) 11/17/2016 Pneumococcal Polysaccharide (PPSV-23) 12/1/2006, 3/8/2000 TB Skin Test (PPD) Intradermal 12/24/2017 Td 6/18/2004 Zoster Vaccine, Live 6/2/2009 Follow-up Information Follow up With Details Comments Contact Info Michelle Nowak MD In 1 week routine follow up [address] [facility] [address] [facility] [address] 
[phone] [facility] Go to next available appt for pulmonary fibrosis,OFFICE WILL CALL WITH APPT. [address] [facility] 
[phone] Discharge Instructions DISCHARGE SUMMARY from Nurse PATIENT INSTRUCTIONS: 
 
 
F-face looks uneven A-arms unable to move or move unevenly S-speech slurred or non-existent T-time-call 911 as soon as signs and symptoms begin-DO NOT go Back to bed or wait to see if you get better-TIME IS BRAIN. Warning Signs of HEART ATTACK Call 911 if you have these symptoms: 
? Chest discomfort.  Most heart attacks involve discomfort in the center of the chest that lasts more than a few minutes, or that goes away and comes back. It can feel like uncomfortable pressure, squeezing, fullness, or pain. ? Discomfort in other areas of the upper body. Symptoms can include pain or discomfort in one or both arms, the back, neck, jaw, or stomach. ? Shortness of breath with or without chest discomfort. ? Other signs may include breaking out in a cold sweat, nausea, or lightheadedness. Don't wait more than five minutes to call [address]! Fast action can save your life. Calling 911 is almost always the fastest way to get lifesaving treatment. Emergency Medical Services staff can begin treatment when they arrive  up to an hour sooner than if someone gets to the hospital by car. The discharge information has been reviewed with the {PATIENT PARENT GUARDIAN:64146}. The {PATIENT PARENT GUARDIAN:96910} verbalized understanding. Discharge medications reviewed with the {Dishcarge meds reviewed FZCV:69425} and appropriate educational materials and side effects teaching were provided. ___________________________________________________________________________________________________________________________________ Chart Review Routing History Recipient Method Report Sent By Curry Gary MD  
Phone: [phone] In Basket IP Auto Routed Juan Diego Angela MD [[id number]] 9/15/2016  8:16 PM 09/15/2016

## 2018-01-17 NOTE — ED PROVIDER NOTES
HPI Comments: Pt is a 79 yo female who is coming in from the nursing home with wheezing. She has given albuterol and Solu-Medrol prior to arrival.  Patient states she feels fine and has no trouble breathing although for the last 3 days she states she has had a lot of noise with her breathing has been bothering her. There is no fevers or chills she does have a history of heart failure    Patient is a 80 y.o. female presenting with respiratory distress syndrome. The history is provided by the patient, the EMS personnel and medical records. Respiratory Distress   Associated symptoms include wheezing. Pertinent negatives include no fever, no cough, no chest pain, no vomiting and no abdominal pain. Past Medical History:   Diagnosis Date    Arthritis     Cancer ([facility] [address]     pre-cancerous cyst on pancreas    Exertional dyspnea     HBP (high blood pressure)     Hypothyroid     PE (pulmonary embolism) 2000    In [facility] 10 days - blood thinner    TB (pulmonary tuberculosis) 193       Past Surgical History:   Procedure Laterality Date    HX APPENDECTOMY  1940    HX BACK SURGERY  2016    cyst removal; burst     HX CARPAL TUNNEL RELEASE Bilateral     HX  SECTION      HX HIP REPLACEMENT Left 2007    HX TONSIL AND ADENOIDECTOMY  193         Family History:   Problem Relation Age of Onset    Tuberculosis Father     Cancer Mother     No Known Problems Sister     No Known Problems Brother        Social History     Social History    Marital status: SINGLE     Spouse name: N/A    Number of children: N/A    Years of education: N/A     Occupational History    Not on file.      Social History Main Topics    Smoking status: Never Smoker    Smokeless tobacco: Never Used    Alcohol use No    Drug use: No    Sexual activity: Not on file     Other Topics Concern    Not on file     Social History Narrative         ALLERGIES: Methotrexate and Sulfa (sulfonamide antibiotics)    Review of Systems Constitutional: Negative for chills and fever. Respiratory: Positive for wheezing. Negative for cough, choking, chest tightness, shortness of breath and stridor. Cardiovascular: Negative for chest pain and palpitations. Gastrointestinal: Negative for abdominal pain, diarrhea, nausea and vomiting. Skin: Negative. All other systems reviewed and are negative. Vitals:    01/17/18 1633 01/17/18 1725 01/17/18 1725   BP: 139/63     Pulse: 94     Resp: 24     Temp: 98 °F (36.7 °C)     SpO2: 100% 99% 97%   Weight: 79.8 kg (176 lb)     Height: 5' 4\" (1.626 m)              Physical Exam   Constitutional: She is oriented to person, place, and time. She appears well-developed and well-nourished. No distress. Can hear wheezing from across the room. HENT:   Head: Normocephalic and atraumatic. Eyes: Conjunctivae are normal. No scleral icterus. Neck: Normal range of motion. Neck supple. Cardiovascular: Normal rate, regular rhythm and normal heart sounds. Pulmonary/Chest: Effort normal. No stridor. No respiratory distress. She has wheezes (loud bilateral expiratory wheezes). She has no rales. She exhibits no tenderness. Abdominal: Soft. She exhibits no distension. There is no tenderness. There is no rebound and no guarding. Neurological: She is alert and oriented to person, place, and time. No focal weakness   Skin: Skin is warm and dry. No rash noted. She is not diaphoretic. No erythema. Psychiatric: She has a normal mood and affect. Her behavior is normal.   Nursing note and vitals reviewed. MDM  Number of Diagnoses or Management Options  Diagnosis management comments: Patient has continued wheezing and upper respiratory sounds. She had only minimal relief with albuterol improvement with epi seems to be slowly getting better with steroids. I consult the ENT . Dior Meade came in and did a scope in the ED he sees no obstructions.     Michael Bundy MD; 1/17/2018 @9:47 PM Voice dictation software was used during the making of this note. This software is not perfect and grammatical and other typographical errors may be present.   This note has not been proofread for errors.  ===================================================================        Amount and/or Complexity of Data Reviewed  Clinical lab tests: ordered and reviewed (Results for orders placed or performed during the hospital encounter of 01/17/18  -CBC WITH AUTOMATED DIFF       Result                                            Value                         Ref Range                       WBC                                               9.8                           4.3 - 11.1 K/uL                 RBC                                               4.18                          4.05 - 5.25 M/uL                HGB                                               11.3 (L)                      11.7 - 15.4 g/dL                HCT                                               36.5                          35.8 - 46.3 %                   MCV                                               87.3                          79.6 - 97.8 FL                  MCH                                               27.0                          26.1 - 32.9 PG                  MCHC                                              31.0 (L)                      31.4 - 35.0 g/dL                RDW                                               19.6 (H)                      11.9 - 14.6 %                   PLATELET                                          365                           150 - 450 K/uL                  MPV                                               11.0                          10.8 - 14.1 FL                  DF                                                MANUAL                                                        NEUTROPHILS                                       76                            43 - 78 %                       LYMPHOCYTES 15                            13 - 44 %                       MONOCYTES                                         7                             4.0 - 12.0 %                    EOSINOPHILS                                       2                             0.5 - 7.8 %                     BASOPHILS                                         0                             0.0 - 2.0 %                     IMMATURE GRANULOCYTES                             1                             0.0 - 5.0 %                     ABS. NEUTROPHILS                                  7.4                           1.7 - 8.2 K/UL                  ABS. LYMPHOCYTES                                  1.5                           0.5 - 4.6 K/UL                  ABS. MONOCYTES                                    0.7                           0.1 - 1.3 K/UL                  ABS. EOSINOPHILS                                  0.2                           0.0 - 0.8 K/UL                  ABS. BASOPHILS                                    0.0                           0.0 - 0.2 K/UL                  ABS. IMM.  GRANS.                                  0.1                           0.0 - 0.5 K/UL                  RBC COMMENTS                                      NORMOCYTIC/NORMOCHROMIC                                       WBC COMMENTS                                      Result Confirmed By Smear                                     PLATELET COMMENTS                                 ADEQUATE                                                 -METABOLIC PANEL, COMPREHENSIVE       Result                                            Value                         Ref Range                       Sodium                                            138                           136 - 145 mmol/L                Potassium                                         5.1                           3.5 - 5.1 mmol/L                Chloride 99                            98 - 107 mmol/L                 CO2                                               29                            21 - 32 mmol/L                  Anion gap                                         10                            7 - 16 mmol/L                   Glucose                                           174 (H)                       65 - 100 mg/dL                  BUN                                               31 (H)                        8 - 23 MG/DL                    Creatinine                                        1.68 (H)                      0.6 - 1.0 MG/DL                 GFR est AA                                        37 (L)                        >60 ml/min/1.73m2               GFR est non-AA                                    30 (L)                        >60 ml/min/1.73m2               Calcium                                           8.4                           8.3 - 10.4 MG/DL                Bilirubin, total                                  0.2                           0.2 - 1.1 MG/DL                 ALT (SGPT)                                        17                            12 - 65 U/L                     AST (SGOT)                                        30                            15 - 37 U/L                     Alk. phosphatase                                  74                            50 - 136 U/L                    Protein, total                                    7.5                           6.3 - 8.2 g/dL                  Albumin                                           2.9 (L)                       3.2 - 4.6 g/dL                  Globulin                                          4.6 (H)                       2.3 - 3.5 g/dL                  A-G Ratio                                         0.6 (L)                       1.2 - 3.5                  -BNP       Result                                            Value                         Ref Range                       BNP                                               309                           pg/mL                      -TROPONIN I       Result                                            Value                         Ref Range                       Troponin-I, Qt.                                   0. 07 (H)                      0.02 - 0.05 NG/ML          -EKG, 12 LEAD, INITIAL       Result                                            Value                         Ref Range                       Ventricular Rate                                  97                            BPM                             Atrial Rate                                       98                            BPM                             QRS Duration                                      84                            ms                              Q-T Interval                                      370                           ms                              QTC Calculation (Bezet)                           469                           ms                              Calculated R Axis                                 97                            degrees                         Calculated T Axis                                 67                            degrees                         Diagnosis                                                                                                   Atrial fibrillation   Rightward axis   Cannot rule out Anterior infarct , age undetermined   Abnormal ECG   When compared with ECG of 23-DEC-2017 14:50,   No significant change was found    )  Tests in the radiology section of CPT®: ordered and reviewed (Xr Chest Port    Result Date: 1/17/2018  Portable chest: History: Severe shortness of breath today, history of pulmonary fibrosis. Comparison: 12/24/2017 Findings: A single view of the chest was obtained at 1651 hours.  The cardiac and mediastinal silhouette are normal in size and configuration. Lung volumes are mildly diminished with scattered coarse interstitial opacities, unchanged. There are no superimposed airspace consolidations. There are no pleural effusions. The pulmonary vascularity is within normal limits. Impression: Chronic changes without evidence of acute pulmonary process.     )      ED Course       Procedures

## 2018-01-17 NOTE — IP AVS SNAPSHOT
[address] [facility] [address] [facility] 
[phone] Patient: Bailey Weathers MRN: [id number] :10/30/1924 A check surya indicates which time of day the medication should be taken. My Medications START taking these medications Instructions Each Dose to Equal  
 Morning Noon Evening Bedtime  
 lisinopril 20 mg tablet Commonly known as:  Sandra Zapata Your last dose was: Your next dose is: Take 1 Tab by mouth daily. 20 mg  
    
   
   
   
  
 predniSONE 20 mg tablet Commonly known as:  Tati Hatch Your last dose was: Your next dose is: Take 60mg daily x2d, then 40mg qday x2d, then 20mg qday x1d, then stop. CHANGE how you take these medications Instructions Each Dose to Equal  
 Morning Noon Evening Bedtime  
 albuterol-ipratropium 2.5 mg-0.5 mg/3 ml Nebu Commonly known as:  Ramona Saavedra What changed:  See the new instructions. Your last dose was: Your next dose is:    
   
   
 3 mL by Nebulization route every six (6) hours as needed. 3 mL CONTINUE taking these medications Instructions Each Dose to Equal  
 Morning Noon Evening Bedtime  
 aspirin delayed-release 81 mg tablet Your last dose was: Your next dose is: Take 1 Tab by mouth daily. 81 mg  
    
   
   
   
  
 azithromycin 500 mg Tab Commonly known as:  Mely Gola Your last dose was: Your next dose is: Take 1 Tab by mouth daily. 500 mg  
    
   
   
   
  
 budesonide-formoterol 160-4.5 mcg/actuation Hfaa Commonly known as:  SYMBICORT Your last dose was: Your next dose is: Take 2 Puffs by inhalation two (2) times a day. 2 Puff  
    
   
   
   
  
 dilTIAZem  mg ER capsule Commonly known as:  CARDIZEM CD Your last dose was: Your next dose is: Take 1 Cap by mouth daily. 120 mg  
    
   
   
   
  
 furosemide 40 mg tablet Commonly known as:  LASIX Your last dose was: Your next dose is: Take 1 Tab by mouth daily. 40 mg  
    
   
   
   
  
 glucosamine sulfate 500 mg tablet Commonly known as:  GLUCOSAMINE Your last dose was: Your next dose is: Take 1 Tab by mouth two (2) times a day. 500 mg  
    
   
   
   
  
 levothyroxine 75 mcg tablet Commonly known as:  SYNTHROID Your last dose was: Your next dose is: TAKE 1 TABLET BY MOUTH  DAILY BEFORE BREAKFAST  
     
   
   
   
  
 magnesium oxide 400 mg tablet Commonly known as:  MAG-OX Your last dose was: Your next dose is: Take 1 Tab by mouth two (2) times a day. 400 mg  
    
   
   
   
  
 metoprolol tartrate 50 mg tablet Commonly known as:  LOPRESSOR Your last dose was: Your next dose is: Take 1 Tab by mouth every twelve (12) hours. 50 mg  
    
   
   
   
  
 mirabegron ER 25 mg ER tablet Commonly known as:  MYRBETRIQ Your last dose was: Your next dose is: Take 1 Tab by mouth daily. 25 mg  
    
   
   
   
  
  
STOP taking these medications   
 acetaminophen 325 mg tablet Commonly known as:  TYLENOL Nebulizer & Compressor machine Nebulizer Accessories Kit Where to Get Your Medications These medications were sent to [facility], [address] [facility] [address] Rina Gonzalez [address], [place] [address] Phone:  [phone]  
  albuterol-ipratropium 2.5 mg-0.5 mg/3 ml Nebu  
 lisinopril 20 mg tablet  
 predniSONE 20 mg tablet

## 2018-01-17 NOTE — IP AVS SNAPSHOT
[address] 
[phone] Patient: Raphael Reid MRN: [id number] :10/30/1924 About your hospitalization You were admitted on:  2018 You last received care in the:  [address] [place] [address] You were discharged on:  2018 Why you were hospitalized Your primary diagnosis was:  Acute On Chronic Respiratory Failure With Hypoxia (Hcc) Your diagnoses also included:  Mild Persistent Asthma Without Complication, Paroxysmal Atrial Fibrillation (Hcc), Pulmonary Fibrosis (Hcc), Stage 3 Chronic Kidney Disease, Chronic Diastolic Congestive Heart Failure (Hcc), Uri (Upper Respiratory Infection), Oropharyngeal Dysphagia Follow-up Information Follow up With Details Comments Contact Info Vesta Arreguin MD In 1 week routine follow up [address] [facility] [address] 5 [facility] [address] 
[phone] [facility] Go to next available appt for pulmonary fibrosis,OFFICE WILL CALL WITH APPT. [address] [place] [facility] [address] [place] [address] 
[phone] Your Scheduled Appointments 2018  8:30 AM EST Follow Up with Vesta Arreguin MD  
[address] [facility] ([address] [facility]) [address] [place] [id number]  
[phone] 2018 11:00 AM EST  
CONSULT with Uli Gan MD  
[facility] ([address] [facility]) 2 [facility] 
[address] [facility] 81  
[phone] 2018  1:00 PM EST  
(Arrive by 12:30 PM) New Patient with [facility] ([facility]) [address] [place] [address] [facility]  
[phone] 2018 10:00 AM EDT Follow Up with Vesta Arreguin MD  
 [address] [facility] ([address] [place] [address]) [address] [place] [address]  
[phone] Discharge Orders None A check surya indicates which time of day the medication should be taken. My Medications START taking these medications Instructions Each Dose to Equal  
 Morning Noon Evening Bedtime  
 lisinopril 20 mg tablet Commonly known as:  Anny Bah Your last dose was: Your next dose is: Take 1 Tab by mouth daily. 20 mg  
    
   
   
   
  
 predniSONE 20 mg tablet Commonly known as:  Nicolette Bryant Your last dose was: Your next dose is: Take 60mg daily x2d, then 40mg qday x2d, then 20mg qday x1d, then stop. CHANGE how you take these medications Instructions Each Dose to Equal  
 Morning Noon Evening Bedtime  
 albuterol-ipratropium 2.5 mg-0.5 mg/3 ml Nebu Commonly known as:  Pearl Gerardo What changed:  See the new instructions. Your last dose was: Your next dose is:    
   
   
 3 mL by Nebulization route every six (6) hours as needed. 3 mL CONTINUE taking these medications Instructions Each Dose to Equal  
 Morning Noon Evening Bedtime  
 aspirin delayed-release 81 mg tablet Your last dose was: Your next dose is: Take 1 Tab by mouth daily. 81 mg  
    
   
   
   
  
 azithromycin 500 mg Tab Commonly known as:  Tatiana Alvarado Your last dose was: Your next dose is: Take 1 Tab by mouth daily. 500 mg  
    
   
   
   
  
 budesonide-formoterol 160-4.5 mcg/actuation Hfaa Commonly known as:  SYMBICORT Your last dose was: Your next dose is: Take 2 Puffs by inhalation two (2) times a day. 2 Puff  
    
   
   
   
  
 dilTIAZem  mg ER capsule Commonly known as:  CARDIZEM CD Your last dose was: Your next dose is: Take 1 Cap by mouth daily. 120 mg  
    
   
   
   
  
 furosemide 40 mg tablet Commonly known as:  LASIX Your last dose was: Your next dose is: Take 1 Tab by mouth daily. 40 mg  
    
   
   
   
  
 glucosamine sulfate 500 mg tablet Commonly known as:  GLUCOSAMINE Your last dose was: Your next dose is: Take 1 Tab by mouth two (2) times a day. 500 mg  
    
   
   
   
  
 levothyroxine 75 mcg tablet Commonly known as:  SYNTHROID Your last dose was: Your next dose is: TAKE 1 TABLET BY MOUTH  DAILY BEFORE BREAKFAST  
     
   
   
   
  
 magnesium oxide 400 mg tablet Commonly known as:  MAG-OX Your last dose was: Your next dose is: Take 1 Tab by mouth two (2) times a day. 400 mg  
    
   
   
   
  
 metoprolol tartrate 50 mg tablet Commonly known as:  LOPRESSOR Your last dose was: Your next dose is: Take 1 Tab by mouth every twelve (12) hours. 50 mg  
    
   
   
   
  
 mirabegron ER 25 mg ER tablet Commonly known as:  MYRBETRIQ Your last dose was: Your next dose is: Take 1 Tab by mouth daily. 25 mg  
    
   
   
   
  
  
STOP taking these medications   
 acetaminophen 325 mg tablet Commonly known as:  TYLENOL Nebulizer & Compressor machine Nebulizer Accessories Kit Where to Get Your Medications These medications were sent to [address] [place] [address], 2450 [facility] 610 Rina Gonzalez [address] 3131 [facility] [address], [place] [address] Phone:  [phone]  
  albuterol-ipratropium 2.5 mg-0.5 mg/3 ml Nebu  
 lisinopril 20 mg tablet  
 predniSONE 20 mg tablet Discharge Instructions DISCHARGE SUMMARY from Nurse PATIENT INSTRUCTIONS: 
 
 
F-face looks uneven A-arms unable to move or move unevenly S-speech slurred or non-existent T-time-call 911 as soon as signs and symptoms begin-DO NOT go Back to bed or wait to see if you get better-TIME IS BRAIN. Warning Signs of HEART ATTACK Call 911 if you have these symptoms: 
? Chest discomfort. Most heart attacks involve discomfort in the center of the chest that lasts more than a few minutes, or that goes away and comes back. It can feel like uncomfortable pressure, squeezing, fullness, or pain. ? Discomfort in other areas of the upper body. Symptoms can include pain or discomfort in one or both arms, the back, neck, jaw, or stomach. ? Shortness of breath with or without chest discomfort. ? Other signs may include breaking out in a cold sweat, nausea, or lightheadedness. Don't wait more than five minutes to call [address]! Fast action can save your life. Calling 911 is almost always the fastest way to get lifesaving treatment. Emergency Medical Services staff can begin treatment when they arrive  up to an hour sooner than if someone gets to the hospital by car. The discharge information has been reviewed with the {PATIENT PARENT GUARDIAN:75624}. The {PATIENT PARENT GUARDIAN:98327} verbalized understanding. Discharge medications reviewed with the {Dishcarge meds reviewed YAYN:20795} and appropriate educational materials and side effects teaching were provided. ___________________________________________________________________________________________________________________________________ MyChart Announcement We are excited to announce that we are making your provider's discharge notes available to you in [employer].   You will see these notes when they are completed and signed by the physician that discharged you from your recent hospital stay. If you have any questions or concerns about any information you see in [employer], please call the Health Information Department where you were seen or reach out to your Primary Care Provider for more information about your plan of care. Introducing [facility]! [address] introduces [employer] patient portal. Now you can access parts of your medical record, email your doctor's office, and request medication refills online. 1. In your internet browser, go to [email or web address] 2. Click on the First Time User? Click Here link in the Sign In box. You will see the New Member Sign Up page. 3. Enter your [employer] Access Code exactly as it appears below. You will not need to use this code after youve completed the sign-up process. If you do not sign up before the expiration date, you must request a new code. · [employer] Access Code: [email or web address] Expires: 3/1/2018  8:44 AM 
 
4. Enter the last four digits of your Social Security Number (xxxx) and Date of Birth (mm/dd/yyyy) as indicated and click Submit. You will be taken to the next sign-up page. 5. Create a [employer] ID. This will be your [employer] login ID and cannot be changed, so think of one that is secure and easy to remember. 6. Create a [employer] password. You can change your password at any time. 7. Enter your Password Reset Question and Answer. This can be used at a later time if you forget your password. 8. Enter your e-mail address. You will receive e-mail notification when new information is available in [address] 9. Click Sign Up. You can now view and download portions of your medical record. 10. Click the Download Summary menu link to download a portable copy of your medical information.  
 
If you have questions, please visit the Frequently Asked Questions section of the [employer]. Remember, MyChart is NOT to be used for urgent needs. For medical emergencies, dial 911. Now available from your iPhone and Android! Providers Seen During Your Hospitalization Provider Specialty Primary office phone Juarez Jimenez MD Emergency Medicine [phone] Jannie Worley MD [facility] [phone] Your Primary Care Physician (PCP) Primary Care Physician Office Phone Office Fax Ramírez Bryant [phone] You are allergic to the following Allergen Reactions Methotrexate Other (comments) Damaged kidney and liver Sulfa (Sulfonamide Antibiotics) Hives Recent Documentation Height Weight BMI OB Status Smoking Status 1.626 m 83.9 kg 31.76 kg/m2 Postmenopausal Never Smoker Emergency Contacts Name Discharge Info Relation Home Work Mobile [facility] DISCHARGE CAREGIVER [3] Son [22] [phone] RennyAicha DISCHARGE CAREGIVER [3] Friend [5] [phone] Patient Belongings The following personal items are in your possession at time of discharge: 
                   Clothing: With patient Discharge Instructions Attachments/References OXYGEN THERAPY (ENGLISH) Patient Handouts Oxygen Therapy: Care Instructions Your Care Instructions Oxygen therapy helps you get more oxygen into your lungs and bloodstream. You may use it if you have a disease that makes it hard to breathe, such as COPD, pulmonary fibrosis (scarring of the lungs), or heart failure. Oxygen therapy can make it easier for you to breathe and can reduce your heart's workload. Some people need extra oxygen all the time. Others need it from time to time throughout the day or overnight. A doctor will prescribe how much oxygen you need and how often to use it.  
To breathe the oxygen, most people use a nasal cannula (say \"JESSICA-yuh-lizbeth\"). This is a thin tube with two prongs that fit just inside your nose. People who need a lot of oxygen may need to use a mask that fits over the nose and mouth. Follow-up care is a key part of your treatment and safety. Be sure to make and go to all appointments, and call your doctor if you are having problems. It's also a good idea to know your test results and keep a list of the medicines you take. How can you care for yourself at home? To help yourself · Using oxygen may dry out your nose or lips. Use water-based lubricants on your lips or nostrils. Do not use an oil-based product like petroleum jelly. · If you use a nasal cannula, the tubing may rub under your nostrils and around your ears. To keep your skin from getting sore, tuck some gauze under the tubing. Use a water-based lotion on rubbed areas. · Do not use alcohol or take drugs that relax you, because they will slow your breathing rate. · Keep track of how much oxygen is in the tank, and reorder before it runs out. If a holiday is coming up or you expect bad weather, order in advance or make your regular order larger. · You may need extra oxygen when you travel to high altitudes or travel by plane. Ask your doctor about this. · If you are getting oxygen directly to your windpipe through an opening in your neck, your doctor will teach you how to care for the equipment. To make sure oxygen is flowing · Check the flow by holding your mask or cannula up to your ear and listening for the \"hiss\" of airflow. · If you have a nasal cannula, dip the prongs in a glass of water. If you see bubbles, oxygen is coming through. · Check your pressure gauge or contents indicator. · If you use an oxygen concentrator, make sure it is turned on and plugged in. If you use a cylinder, make sure the valve is open. · Look for kinks, blockages, or water in the tubing. Be sure the tubing is connected to the oxygen source. · Do not change your oxygen flow rate. Your doctor sets this at the correct level. Higher flow rates usually do not help and can increase the risk of harmful carbon dioxide buildup in the blood. To be safe · Do not leave cords or tubing running across an area where you or someone else may trip on it. · Do not let oxygen containers get hot. Store them in a cool place where there is airflow. Do not leave them in a car trunk or a hot vehicle. · Keep oxygen containers upright. Make sure they do not fall over and get damaged. Try securing the tanks in a sturdy container or securing them with a rope or a chain. · Watch for signs of oxygen leaks. If you hear a loud hissing from your container or if it empties too fast, stay away from the container. Open windows right away and call the company that brought the oxygen system to your home. · Do not use oxygen around anything that could spark or easily cause a fire. ¨ Do not smoke or let others smoke while you are using oxygen. Put up \"no smoking\" signs in your home. ¨ Do not use oxygen near open flames, such as candles, fireplaces, gas stoves, or hot water heaters. Do not use it near electric razors, hair dryers, heating pads, or anything that may spark. ¨ Keep a working fire extinguisher in your home where it is easy to get to. ¨ If a fire starts, turn off the oxygen right away and leave the house. ¨ If you have an oxygen concentrator, do not use it if the cord looks damaged. Do not use an extension cord to plug it in. Do not plug it into an outlet that has other appliances plugged into it. To care for the equipment · Follow the directions that come with the equipment for using and caring for it. · Wash your cannula or mask with a liquid soap and warm water 1 or 2 times a week. Replace them every 2 to 4 weeks. · If you have a cold, change the nasal prongs when your cold symptoms are done.  
· If you have an oxygen concentrator, unplug the unit and wipe down the cabinet with a damp cloth daily. Clean the air filter at least 2 times a week. Where can you learn more? Go to http://aleksandr-sumaya.info/. Enter E117 in the search box to learn more about \"Oxygen Therapy: Care Instructions. \" Current as of: May 12, 2017 Content Version: 11.4 © 7426-4327 [employer]. Care instructions adapted under license by [employer] (which disclaims liability or warranty for this information). If you have questions about a medical condition or this instruction, always ask your healthcare professional. [address] any warranty or liability for your use of this information. Please provide this summary of care documentation to your next provider. Signatures-by signing, you are acknowledging that this After Visit Summary has been reviewed with you and you have received a copy. Patient Signature:  ____________________________________________________________ Date:  ____________________________________________________________  
  
Camila Sleeper Provider Signature:  ____________________________________________________________ Date:  ____________________________________________________________

## 2018-01-17 NOTE — ED TRIAGE NOTES
Pt in via [facility] c/o sob worsening over last several days attempted inhaler and nebulizer at home without relief. Pt given 5mg albuterol 3.5 duoneb and 125mg solumedrol  22gague iv in left forearm. Pt denies fever or chest pain. Pt with audible wheezing on triage. bgl 176 with ems.

## 2018-01-18 PROBLEM — N18.30 STAGE 3 CHRONIC KIDNEY DISEASE (HCC): Status: ACTIVE | Noted: 2017-01-01

## 2018-01-18 PROBLEM — J96.01 ACUTE RESPIRATORY FAILURE WITH HYPOXIA (HCC): Status: ACTIVE | Noted: 2018-01-01

## 2018-01-18 PROBLEM — I48.91 ATRIAL FIBRILLATION (HCC): Chronic | Status: ACTIVE | Noted: 2017-01-01

## 2018-01-18 PROBLEM — J45.30 MILD PERSISTENT ASTHMA WITHOUT COMPLICATION: Chronic | Status: ACTIVE | Noted: 2017-02-16

## 2018-01-18 PROBLEM — I50.9 CHF (CONGESTIVE HEART FAILURE) (HCC): Chronic | Status: ACTIVE | Noted: 2017-01-01

## 2018-01-18 PROBLEM — N18.30 STAGE 3 CHRONIC KIDNEY DISEASE (HCC): Chronic | Status: ACTIVE | Noted: 2017-01-01

## 2018-01-18 PROBLEM — I48.0 PAROXYSMAL ATRIAL FIBRILLATION (HCC): Chronic | Status: ACTIVE | Noted: 2017-01-01

## 2018-01-18 PROBLEM — J84.10 PULMONARY FIBROSIS (HCC): Chronic | Status: ACTIVE | Noted: 2017-01-01

## 2018-01-18 PROBLEM — N18.9 CKD (CHRONIC KIDNEY DISEASE): Chronic | Status: ACTIVE | Noted: 2017-01-01

## 2018-01-18 PROBLEM — I50.32 CHRONIC DIASTOLIC CONGESTIVE HEART FAILURE (HCC): Chronic | Status: ACTIVE | Noted: 2017-01-01

## 2018-01-18 NOTE — ED NOTES
Pt given breakfast at this time and morning medications given. Room has been assigned and report will be called.

## 2018-01-18 NOTE — PROGRESS NOTES
RA Sat=86%. 1L NC SAT=88%  2L NC SAT=93%  Asked pt if stable walking with walker or any help. Pt stated that she is not mobile for several years now.

## 2018-01-18 NOTE — ED NOTES
Pt bed linens and brief changed. Aundrea care performed. Home health caregiver assisted and remains at bedside. Respirations present and unlabored.

## 2018-01-18 NOTE — CONSULTS
Ears/Nose/Throat Consult    Subjective:     Date of Consultation:  January 17, 2018    Referring Physician:  Dr. Sri Morfin    History of Present Illness:   Patient is a 80 y.o.  female who is being seen for stridor and shortness of breath. She came to the ER today due to increasing stridor over the past 5 days. It started on Saturday and has progressively gotten worse. She had an appt scheduled today with DR. Naidu but offices were closed due to severe weather. She then came to the ER. There is no bleeding, + hoarseness, shortness of breath, stridor. There is dysphagia and choking with water, but this has been present for a long time. She states it hasn't been worked up. She was given some breathing treatments and has improved some. She doesn't feel she is back to her normal baseline. She does have underlying pulmonary fibrosis but denies any recent illness.       Patient Active Problem List    Diagnosis Date Noted    Pulmonary fibrosis ([address] 12/25/2017    Atrial fibrillation ([address] 12/23/2017    Bronchitis 12/23/2017    Sepsis ([address] 12/23/2017    CHF (congestive heart failure) ([address] 12/23/2017    CKD (chronic kidney disease) 12/23/2017    Elevated troponin 12/23/2017    Mild persistent asthma without complication 44/20/1757    Urge incontinence 10/28/2016    Nocturnal enuresis 10/28/2016    IPMN (intraductal papillary mucinous neoplasm) 12/12/2014    Rheumatoid arthritis ([address] 11/13/2014    Leukocytosis 11/12/2014     Past Medical History:   Diagnosis Date    Arthritis     Cancer ([address]     pre-cancerous cyst on pancreas    Exertional dyspnea     HBP (high blood pressure)     Hypothyroid     PE (pulmonary embolism) 2000    In [facility] - blood thinner    TB (pulmonary tuberculosis) 1930      Family History   Problem Relation Age of Onset    Tuberculosis Father     Cancer Mother     No Known Problems Sister     No Known Problems Brother       Social History   Substance Use Topics  Smoking status: Never Smoker    Smokeless tobacco: Never Used    Alcohol use No     Past Surgical History:   Procedure Laterality Date    HX APPENDECTOMY  1940    HX BACK SURGERY  2016    cyst removal; burst     HX CARPAL TUNNEL RELEASE Bilateral     HX  SECTION      HX HIP REPLACEMENT Left 2007    HX TONSIL AND ADENOIDECTOMY  1931      Current Facility-Administered Medications   Medication Dose Route Frequency    sodium chloride 0.9 % bolus infusion 100 mL  100 mL IntraVENous ONCE    saline peripheral flush soln 10 mL  10 mL InterCATHeter RAD ONCE    iopamidol (ISOVUE-370) 76 % injection 80 mL  80 mL IntraVENous RAD ONCE    sodium chloride 0.9 % bolus infusion 1,000 mL  1,000 mL IntraVENous ONCE     Current Outpatient Prescriptions   Medication Sig    acetaminophen (TYLENOL) 325 mg tablet Take 2 Tabs by mouth every six (6) hours as needed.  aspirin delayed-release 81 mg tablet Take 1 Tab by mouth daily.  dilTIAZem CD (CARDIZEM CD) 120 mg ER capsule Take 1 Cap by mouth daily.  furosemide (LASIX) 40 mg tablet Take 1 Tab by mouth daily.  magnesium oxide (MAG-OX) 400 mg tablet Take 1 Tab by mouth two (2) times a day.  metoprolol tartrate (LOPRESSOR) 50 mg tablet Take 1 Tab by mouth every twelve (12) hours.  azithromycin (ZITHROMAX) 500 mg tab Take 1 Tab by mouth daily.  levothyroxine (SYNTHROID) 75 mcg tablet TAKE 1 TABLET BY MOUTH  DAILY BEFORE BREAKFAST    glucosamine sulfate (GLUCOSAMINE) 500 mg tablet Take 1 Tab by mouth two (2) times a day.  albuterol-ipratropium (DUO-NEB) 2.5 mg-0.5 mg/3 ml nebu USE 3ML(1 VIAL) VIA NEBULIZER EVERY 6 HOURS AS NEEDED    Nebulizer & Compressor machine 1 Each by Does Not Apply route every four (4) hours as needed.  Nebulizer Accessories kit Uses directed    budesonide-formoterol (SYMBICORT) 160-4.5 mcg/actuation HFA inhaler Take 2 Puffs by inhalation two (2) times a day.     mirabegron ER (MYRBETRIQ) 25 mg ER tablet Take 1 Tab by mouth daily. Allergies   Allergen Reactions    Methotrexate Other (comments)     Damaged kidney and liver    Sulfa (Sulfonamide Antibiotics) Hives        Review of Systems:       Objective:     Patient Vitals for the past 8 hrs:   BP Temp Pulse Resp SpO2 Height Weight   18 1825 - - - - 100 % - -   18 1725 - - - - 97 % - -   18 1725 - - - - 99 % - -   18 1633 139/63 98 °F (36.7 °C) 94 24 100 % 5' 4\" (1.626 m) 176 lb (79.8 kg)     Temp (24hrs), Av.1 °F (36.2 °C), Min:96.1 °F (35.6 °C), Max:98 °F (36.7 °C)         Physical Exam:   Head  Head and Face - The head and face are atraumatic, normocephalic. The salivary glands are intact and the facial appearance is symmetric. Head shape - No scars, lesions, or masses. Voice is mildly raspy, not breathy. There is noisy breathing on exhalation, no stridor. Ear  Ear - Tympanic membranes are clear, the external auditory canal is without discharge and the tympanic membranes are mobile. There is no tympanic membrane erythema and no middle ear opacity is visualized. Pinna: bilateral - No hematomas or lacerations    Eye  Eyeball - bilateral - extraocular motions intact, equal in size and movement    Nose and Sinuses  Nose - mucosa is pink and the septum is deviated to the right. There are no nasal lesions and there was no turbinate hypertrophy. Mouth and Throat  Lips - upper lip - normal: no dryness, cracking, pallor, cyanosis, or vesicular eruption. Lower lip: normal: no dryness, cracking, pallor, cyanosis, or vesicular eruption. Teeth and Gums - No bleeding, no inflammation or ulceration. Lips - Pink and symmetrical  Oral Cavity - Oral mucosa pink, soft and hard palates contiguous and tongue moist without ulcers. The mucosa is without ulcerations. No oral cavity masses present. Parotid Gland - Bilateral - Non tender, not swollen.   Oropharynx - No discharge or Erythema  Nasopharynx - Non obstructed, mucosa pink and moist.    Hypopharynx - No erythema  Submandibular Gland - Non tender, not swollen. Tonsils - Normal    Neck   Neck - Full range of motion and Supple. Non Tender. No Masses. Trachea - Midline. Thyroid - Gland - Symmetric. Non Tender. Nodules - No nodules. Neurologic - II - XII Grossly intact bilaterally    Cardiac  Inspection - Jugular Vein:  Bilateral - non distended, no prominent pulsations    Chest and Lung  Inspection - Movements:  Chest symmetrical with bilateral expansion, respirations even and non labored    PROCEDURE: Flexible laryngoscopy  INDICATIONS: hoarseness, dysphagia  DESCRIPTION: After verbal consent was obtained and a timeout was performed, the flexible scope was advanced down the patient's right nares into the nasopharynx. The nasal cavity and nasopharynx were clear. The scope was then turned distally down towards the oropharynx. The BOT and vallecula were clear and the epiglottis was normal in appearance. Both aryepiglottic folds were mildly erythematous and there was a normal appearing glottis w/ healthy TVCs. No nodules or polyps and the cords were fully mobile. The cords are right and movement is good but tight. No concerning lesions seen along post-cricoid region or within either piriform sinus. The posterior pharyngeal was clear as well. The scope was then carefully removed. The patient tolerated the procedure well and there were complications. Assessment:     1. Hoarseness  2. Dysphagia  3. Acid reflux  4. Deviated septum    Plan:     Pt counseled. The scope showed some mild acid reflux involving the arytenoids but this can sometimes be seen with inhalers also. There is no airway obstruction, no mass, ulceration or other abnormal finding. With the noisy breathing more on exhalation, this may be more attributable to her fibrosis.   I did recommend that she follow up with Dr. Roger Ambrocio and with her dysphagia with liquids (which has been present for some time but she has chosen not to tell anyone until tonight - per the care giver) she may benefit from a modified barium swallow. All questions answered.         Signed By: Alexander Cuenca DO     January 17, 2018

## 2018-01-18 NOTE — H&P
HOSPITALIST INITIAL HISTORY AND PHYSICAL    NAME:  João Marinelli   Age:  80 y.o.  :   10/30/1924   MRN:   [id number]  PCP: Yanna Joshi MD  Consulting MD:  Treatment Team: Attending Provider: Kalpana Tolliver MD; Primary Nurse: Madelin Hickman RN    CHIEF COMPLAINT: shortness of breath, wheezing    HISTORY OF PRESENT ILLNESS:   João Marinelli is a 80y.o. year-old female with a past medical history of pulmonary fibrosis per report who presents to the ER with complaint of shortness of breath and loud wheezing for the past four days. The patient was hospitalized with sepsis in December and has been more short of breath from her previous baseline since her admission, requiring 24h care at home and having dyspnea from walking from one room to another. She admits to coughing up some phlegm, denies any fevers, chills, nausea, vomiting, constipation, diarrhea, chest pain. REVIEW OF SYSTEMS: Comprehensive ROS performed and negative except as stated in HPI. Past Medical History:   Diagnosis Date    Arthritis     Cancer ([facility]     pre-cancerous cyst on pancreas    Exertional dyspnea     HBP (high blood pressure)     Hypothyroid     PE (pulmonary embolism)     In [facility] - blood thinner    TB (pulmonary tuberculosis) 1930        Past Surgical History:   Procedure Laterality Date    HX APPENDECTOMY  1940    HX BACK SURGERY  2016    cyst removal; burst     HX CARPAL TUNNEL RELEASE Bilateral     HX  SECTION      HX HIP REPLACEMENT Left 2007    HX TONSIL AND ADENOIDECTOMY  193       Prior to Admission Medications   Prescriptions Last Dose Informant Patient Reported? Taking?   aspirin delayed-release 81 mg tablet   No Yes   Sig: Take 1 Tab by mouth daily. azithromycin (ZITHROMAX) 500 mg tab   No Yes   Sig: Take 1 Tab by mouth daily. budesonide-formoterol (SYMBICORT) 160-4.5 mcg/actuation HFA inhaler   No Yes   Sig: Take 2 Puffs by inhalation two (2) times a day. dilTIAZem CD (CARDIZEM CD) 120 mg ER capsule   No Yes   Sig: Take 1 Cap by mouth daily. furosemide (LASIX) 40 mg tablet   No Yes   Sig: Take 1 Tab by mouth daily. glucosamine sulfate (GLUCOSAMINE) 500 mg tablet   No Yes   Sig: Take 1 Tab by mouth two (2) times a day. levothyroxine (SYNTHROID) 75 mcg tablet   No Yes   Sig: TAKE 1 TABLET BY MOUTH  DAILY BEFORE BREAKFAST   magnesium oxide (MAG-OX) 400 mg tablet   No Yes   Sig: Take 1 Tab by mouth two (2) times a day. metoprolol tartrate (LOPRESSOR) 50 mg tablet   No Yes   Sig: Take 1 Tab by mouth every twelve (12) hours. mirabegron ER (MYRBETRIQ) 25 mg ER tablet   No Yes   Sig: Take 1 Tab by mouth daily. Facility-Administered Medications: None       Allergies   Allergen Reactions    Methotrexate Other (comments)     Damaged kidney and liver    Sulfa (Sulfonamide Antibiotics) Hives       FAMILY HISTORY: Reviewed. Negative except   Family History   Problem Relation Age of Onset    Tuberculosis Father     Cancer Mother     No Known Problems Sister     No Known Problems Brother        Social History   Substance Use Topics    Smoking status: Never Smoker    Smokeless tobacco: Never Used    Alcohol use No         Objective:     Visit Vitals    /70    Pulse 92    Temp 98 °F (36.7 °C)    Resp 24    Ht 5' 4\" (1.626 m)    Wt 79.8 kg (176 lb)    SpO2 98%    BMI 30.21 kg/m2      Temp (24hrs), Av.1 °F (36.2 °C), Min:96.1 °F (35.6 °C), Max:98 °F (36.7 °C)    Oxygen Therapy  O2 Sat (%): 98 % (18)  Pulse via Oximetry: 101 beats per minute (18)  O2 Device: Nasal cannula (18)  O2 Flow Rate (L/min): 2 l/min (18)  Physical Exam:  General:    The patient is a very pleasant elderly female in no acute distress but with loud biphasic stridorous breath sounds. Head:   Normocephalic/atraumatic. Eyes:  No palpebral pallor or scleral icterus. ENT:  External auricular and nasal exam within normal limits. Mucous membranes are moist.  Neck:  Supple, non-tender, no JVD. Lungs:   Loudly transmitted upper respiratory biphasic stridorous sounds with no appreciable adventitious lower respiratory sounds. Mild tachypnea, no accessory muscle use. Heart:   Mildly tachycardic, irregularly irregular rhythm, without murmurs, rubs, or gallops. Abdomen:   Soft, non-tender, non-distended with normoactive bowel sounds. Genitourinary: No tenderness over the bladder or bilateral CVAs. Extremities: Without clubbing, cyanosis, or edema. Skin:     Normal color, texture, and turgor. No rashes, lesions, or jaundice. Pulses: Radial and dorsalis pedis pulses present 2+ bilaterally. Capillary refill <2s. Neurologic: CN II-XII grossly intact and symmetrical.     Moving all four extremities well with no focal deficits. Psychiatric: Pleasant demeanor, appropriate affect. Alert and oriented x 3    Data Review:   Recent Results (from the past 24 hour(s))   CBC WITH AUTOMATED DIFF    Collection Time: 01/17/18  5:05 PM   Result Value Ref Range    WBC 9.8 4.3 - 11.1 K/uL    RBC 4.18 4.05 - 5.25 M/uL    HGB 11.3 (L) 11.7 - 15.4 g/dL    HCT 36.5 35.8 - 46.3 %    MCV 87.3 79.6 - 97.8 FL    MCH 27.0 26.1 - 32.9 PG    MCHC 31.0 (L) 31.4 - 35.0 g/dL    RDW 19.6 (H) 11.9 - 14.6 %    PLATELET 063 455 - 690 K/uL    MPV 11.0 10.8 - 14.1 FL    DF MANUAL      NEUTROPHILS 76 43 - 78 %    LYMPHOCYTES 15 13 - 44 %    MONOCYTES 7 4.0 - 12.0 %    EOSINOPHILS 2 0.5 - 7.8 %    BASOPHILS 0 0.0 - 2.0 %    IMMATURE GRANULOCYTES 1 0.0 - 5.0 %    ABS. NEUTROPHILS 7.4 1.7 - 8.2 K/UL    ABS. LYMPHOCYTES 1.5 0.5 - 4.6 K/UL    ABS. MONOCYTES 0.7 0.1 - 1.3 K/UL    ABS. EOSINOPHILS 0.2 0.0 - 0.8 K/UL    ABS. BASOPHILS 0.0 0.0 - 0.2 K/UL    ABS. IMM.  GRANS. 0.1 0.0 - 0.5 K/UL    RBC COMMENTS NORMOCYTIC/NORMOCHROMIC      WBC COMMENTS Result Confirmed By Smear      PLATELET COMMENTS ADEQUATE     METABOLIC PANEL, COMPREHENSIVE    Collection Time: 01/17/18  5:05 PM Result Value Ref Range    Sodium 138 136 - 145 mmol/L    Potassium 5.1 3.5 - 5.1 mmol/L    Chloride 99 98 - 107 mmol/L    CO2 29 21 - 32 mmol/L    Anion gap 10 7 - 16 mmol/L    Glucose 174 (H) 65 - 100 mg/dL    BUN 31 (H) 8 - 23 MG/DL    Creatinine 1.68 (H) 0.6 - 1.0 MG/DL    GFR est AA 37 (L) >60 ml/min/1.73m2    GFR est non-AA 30 (L) >60 ml/min/1.73m2    Calcium 8.4 8.3 - 10.4 MG/DL    Bilirubin, total 0.2 0.2 - 1.1 MG/DL    ALT (SGPT) 17 12 - 65 U/L    AST (SGOT) 30 15 - 37 U/L    Alk. phosphatase 74 50 - 136 U/L    Protein, total 7.5 6.3 - 8.2 g/dL    Albumin 2.9 (L) 3.2 - 4.6 g/dL    Globulin 4.6 (H) 2.3 - 3.5 g/dL    A-G Ratio 0.6 (L) 1.2 - 3.5     BNP    Collection Time: 01/17/18  5:05 PM   Result Value Ref Range     pg/mL   TROPONIN I    Collection Time: 01/17/18  5:05 PM   Result Value Ref Range    Troponin-I, Qt. 0.07 (H) 0.02 - 0.05 NG/ML   EKG, 12 LEAD, INITIAL    Collection Time: 01/17/18  5:17 PM   Result Value Ref Range    Ventricular Rate 97 BPM    Atrial Rate 98 BPM    QRS Duration 84 ms    Q-T Interval 370 ms    QTC Calculation (Bezet) 469 ms    Calculated R Axis 97 degrees    Calculated T Axis 67 degrees    Diagnosis       Atrial fibrillation  Rightward axis  Cannot rule out Anterior infarct , age undetermined  Abnormal ECG  When compared with ECG of 23-DEC-2017 14:50,  No significant change was found         Imaging Tristian Gave /Studies:  Xr Chest Port    Result Date: 1/17/2018  Impression: Chronic changes without evidence of acute pulmonary process. Assessment and Plan: Active Hospital Problems    Diagnosis Date Noted    Hypoxia 01/17/2018    URI (upper respiratory infection) 01/17/2018    Pulmonary fibrosis ([facility] [address] 12/25/2017    Atrial fibrillation ([facility] [facility] 12/23/2017    CKD (chronic kidney disease) 12/23/2017    CHF (congestive heart failure) ([facility] [address] 12/23/2017    Mild persistent asthma without complication 32/38/3920       - Acute hypoxic respiratory failure.  Likely related to upper respiratory infection, likely also contribution from pulmonary fibrosis and possibly also from CHF/Afib. Will treat per below. Supplemental O2 for now, will try to wean. - URI. Stridor present on exam. ENT already consulted and performed laryngoscopy which showed only arytenoid inflammation from GERD. Pt improved with racemic epinephrine, not much improvement with albuterol nebulizer. Will start prednisone to cover upper respiratory inflammatory component. Continue nebulizers, IV Lasix 40 x 1. no indication for antibiotics at this time. - Pulmonary fibrosis. Worsening per pt. Has not seen pulmonologist yet so will place consult. - Atrial fibrillation. Mild tachycardia, likely 2/2 hypoxia. Will continue home medications and monitor heart rate. No home anticoagulation. Will continue ASA and start Ppx dose heparin.    - CKD. Stable, daily CMP. - DVT Prophylaxis: Heparin.    - Code Status: DNR confirmed by patient    - Disposition: Observe on general medical floor for evaluation and treatment as per above.     - Anticipated discharge: < 2 midnights     Signed By: Alonso Guzman MD     January 17, 2018

## 2018-01-18 NOTE — PROGRESS NOTES
Problem: Dysphagia (Adult)  Goal: *Speech Goal: (INSERT TEXT)  Patient will tolerate mechanical soft diet with nectar thick liquids without overt clinical s/sx of aspiration. LTG: Patient will tolerate least restrictive diet without signs/symptoms of aspiration at discharge for safe swallow function. Speech language pathology: bedside swallow note: Initial Assessment   OBSERVATION    NAME/AGE/GENDER: Juany Darby is a 80 y.o. female  DATE: 1/18/2018  PRIMARY DIAGNOSIS: Hypoxia       ICD-10: Treatment Diagnosis: Oropharyngeal Dysphagia R13.12  INTERDISCIPLINARY COLLABORATION: Registered Nurse  PRECAUTIONS/ALLERGIES: Methotrexate and Sulfa (sulfonamide antibiotics) ASSESSMENT:Based on the objective data described below, Ms. Paulette Morales presents with clinical s/sx of Dysphagia. OME was essentially WNL's for patient's age. Patient reports years of \"choking' on water but stated that she never reported the choking to anyone. She was given trials of ice chips,  thin and nectar thick liquids via cup, puree, mixed consistencies and solids. Initial presentation of thin liquids, patient without clinical s/sx of aspiration however at the duration of evaluation, thin liquids was given again and patient with (+) overt clinical s/sx of aspiration evidenced by immediate coughs. No overt clinical s/sx of aspiration was observed with ice chips, nectar, puree, mixed or solids. However patient with increased mastication with mixed consistencies and solids. Patient and caregiver encouraged to make sure that patient is eating at a 90 degree angle to decrease aspiration risk. Caregiver reports that patient always eats and drinks reclined. SLP educated patient and caregiver on position and standard aspiration precaution. SLP recommends 1. Mechanical soft diet, 2. Nectar thick liquids, 3. Upright for all p.o, 4. Medications whole or crushed in puree consistency or thickened liquid. Precautions hung at bedside.    Patient will benefit from skilled intervention to address the below impairments. Of note, patient does have baseline wheezing and stridor that was present prior to p.o trials. ?????? ? ? This section established at most recent assessment??????????  PROBLEM LIST (Impairments causing functional limitations):  1. Dysphagia  REHABILITATION POTENTIAL FOR STATED GOALS: Fair  PLAN OF CARE:   Patient will benefit from skilled intervention to address the following impairments. RECOMMENDATIONS AND PLANNED INTERVENTIONS (Benefits and precautions of therapy have been discussed with the patient.):  · PO:  Mashed mechanical soft with ground meats  · Liquids:  nectar  MEDICATIONS:  · With liquid  · or whole in puree  COMPENSATORY STRATEGIES/MODIFICATIONS INCLUDING:  · Small sips and bites  · Upright for all p.o  OTHER RECOMMENDATIONS (including follow up treatment recommendations):   · Patient education  RECOMMENDED DIET MODIFICATIONS DISCUSSED WITH:  · Family  · Patient  FREQUENCY/DURATION: Continue to follow patient 2 times a week for duration of hospital stay to address above goals. RECOMMENDED REHABILITATION/EQUIPMENT: (at time of discharge pending progress): Due to the probability of continued deficits (see above) this patient will likely need continued skilled speech therapy after discharge. SUBJECTIVE:   Alert  History of Present Injury/Illness: Ms. Manoj Hi  has a past medical history of Arthritis; Cancer ([address] Exertional dyspnea; HBP (high blood pressure); Hypothyroid; PE (pulmonary embolism) (); and TB (pulmonary tuberculosis) (1930). She also has no past medical history of Adverse effect of anesthesia; Difficult intubation; Hematuria, microscopic; Kidney stone; Malignant hyperthermia due to anesthesia; Nausea & vomiting; or Pseudocholinesterase deficiency. .  She also  has a past surgical history that includes hx appendectomy (1940); hx tonsil and adenoidectomy (193); hx hip replacement (Left, ); hx  section; hx carpal tunnel release (Bilateral); and hx back surgery (2016). Present Symptoms:    Pain Intensity 1: 0  Current Medications:   No current facility-administered medications on file prior to encounter. Current Outpatient Prescriptions on File Prior to Encounter   Medication Sig Dispense Refill    aspirin delayed-release 81 mg tablet Take 1 Tab by mouth daily. 30 Tab 0    dilTIAZem CD (CARDIZEM CD) 120 mg ER capsule Take 1 Cap by mouth daily. 30 Cap 0    furosemide (LASIX) 40 mg tablet Take 1 Tab by mouth daily. 30 Tab 0    magnesium oxide (MAG-OX) 400 mg tablet Take 1 Tab by mouth two (2) times a day. 60 Tab 0    metoprolol tartrate (LOPRESSOR) 50 mg tablet Take 1 Tab by mouth every twelve (12) hours. 60 Tab 0    azithromycin (ZITHROMAX) 500 mg tab Take 1 Tab by mouth daily. 3 Tab 0    levothyroxine (SYNTHROID) 75 mcg tablet TAKE 1 TABLET BY MOUTH  DAILY BEFORE BREAKFAST 90 Tab 0    glucosamine sulfate (GLUCOSAMINE) 500 mg tablet Take 1 Tab by mouth two (2) times a day. 60 Tab 0    budesonide-formoterol (SYMBICORT) 160-4.5 mcg/actuation HFA inhaler Take 2 Puffs by inhalation two (2) times a day. 3 Inhaler 3    mirabegron ER (MYRBETRIQ) 25 mg ER tablet Take 1 Tab by mouth daily.  90 Tab 3     Current Dietary Status:  Regular      History of reflux:  NO    Reflux medication:N/A  Social History/Home Situation:    Home Environment: Private residence  # Steps to Enter: 1  Rails to Enter: No  One/Two Story Residence: One story  Living Alone: Yes  Support Systems: Home care staff (has sitters 24 hrs/day)  Patient Expects to be Discharged to[de-identified] Private residence (recommend 24 hr supervision initially)  Current DME Used/Available at Home: Tub transfer bench, Walker, rolling, Commode, bedside  Tub or Shower Type: Tub/Shower combination  OBJECTIVE:   Respiratory Status:  Nasal cannula  2 l/min (decreased to 1)  CXR Results:N/A  MRI/CT Results:N/A  Oral Motor Structure/Speech:  Oral-Motor Structure/Motor Speech  Labial: No impairment  Dentition: Natural  Oral Hygiene: good  Lingual: No impairment  Velum: No impairment  Mandible: No impairment    Cognitive and Communication Status:  Neurologic State: Alert  Orientation Level: Oriented to person  Cognition: Follows commands  Perception: Appears intact  Perseveration: No perseveration noted  Safety/Judgement: Not assessed    BEDSIDE SWALLOW EVALUATION  Oral Assessment:  Oral Assessment  Labial: No impairment  Dentition: Natural  Oral Hygiene: good  Lingual: No impairment  Velum: No impairment  Mandible: No impairment  P.O. Trials:  Patient Position: upright in bed    The patient was given teaspoon to tablespoon   amounts of the following:   Consistency Presented: Solid;Puree; Thin liquid; Ice chips;Mixed consistency; Nectar thick liquid  How Presented: Self-fed/presented;Cup/sip;Spoon    ORAL PHASE:  Bolus Acceptance: No impairment  Bolus Formation/Control: Impaired  Propulsion: No impairment  Type of Impairment: Mastication  Oral Residue: Less than 10% of bolus; Lingual    PHARYNGEAL PHASE:  Initiation of Swallow: Delayed (# of seconds)  Laryngeal Elevation: Decreased  Aspiration Signs/Symptoms: Strong cough  Vocal Quality: No impairment           Pharyngeal Phase Characteristics: No impairment, issues, or problems     OTHER OBSERVATIONS:  Rate/bite size: WNL   Endurance:  Impaired   Comments:       Tool Used: Dysphagia Outcome and Severity Scale (KEY)    Score Comments   Normal Diet  [] 7 With no strategies or extra time needed   Functional Swallow  [] 6 May have mild oral or pharyngeal delay       Mild Dysphagia    [] 5 Which may require one diet consistency restricted (those who demonstrate penetration which is entirely cleared on MBS would be included)   Mild-Moderate Dysphagia  [x] 4 With 1-2 diet consistencies restricted       Moderate Dysphagia  [] 3 With 2 or more diet consistencies restricted       Moderately Severe Dysphagia  [] 2 With partial PO strategies (trials with ST only)       Severe Dysphagia  [] 1 With inability to tolerate any PO safely          Score:  Initial: 4 Most Recent: X (Date: -- )   Interpretation of Tool: The Dysphagia Outcome and Severity Scale (KEY) is a simple, easy-to-use, 7-point scale developed to systematically rate the functional severity of dysphagia based on objective assessment and make recommendations for diet level, independence level, and type of nutrition. Score 7 6 5 4 3 2 1   Modifier CH CI CJ CK CL CM CN   ? Swallowing:     - CURRENT STATUS: CK - 40%-59% impaired, limited or restricted    - GOAL STATUS:  CJ - 20%-39% impaired, limited or restricted    - D/C STATUS:  ---------------To be determined---------------  Payor: Xavi Guerrero / Plan: [address] / Product Type: Managed Care Medicare /     TREATMENT:    (In addition to Assessment/Re-Assessment sessions the following treatments were rendered)  Assessment/Reassessment only, no treatment provided today  MODALITIES:                                                                    ORAL MOTOR  EXERCISES:                                                                                                                                                                      LARYNGEAL / PHARYNGEAL EXERCISES:                                                                                                                                     __________________________________________________________________________________________________  Safety:   After treatment position/precautions:  · Upright in Bed  Treatment Assessment:  Dysphagia evaluation completed. Progression/Medical Necessity:   · Skilled intervention continues to be required due to persistent signs and symptoms of aspiration and patient still consuming a modified diet. Compliance with Program/Exercises: Will assess as treatment progresses.    Reason for Continuation of Services/Other Comments:  · Patient continues to require skilled intervention due to medical complications. Recommendations/Intent for next treatment session: \"Treatment next visit will focus on goals\". Total Treatment Duration:  Time In: 1200  Time Out: [address] [facility], [address] Thania Hoffmann

## 2018-01-18 NOTE — PROGRESS NOTES
Problem: Mobility Impaired (Adult and Pediatric)  Goal: *Acute Goals and Plan of Care (Insert Text)  STG:  (1.)Ms. Shavonne Mahajan will move from supine to sit and sit to supine  with MINIMAL ASSIST within 3 day(s). (2.)Ms. Shavonne Mahajan will transfer from bed to chair and chair to bed with CONTACT GUARD ASSIST using the least restrictive device within 3 day(s). (3.)Ms. Shavonne Mahajan will ambulate with CONTACT GUARD ASSIST for 25 feet with the least restrictive device with stable O2 sat >90% within 3 day(s). LTG:  (1.)Ms. Shavonne Mahajan will move from supine to sit and sit to supine  in bed with CONTACT GUARD ASSIST within 6 day(s). (2.)Ms. Shavonne Mahajan will tolerate sitting up in chair for 3 hrs to improve endurance within 6 day(s). (3.)Ms. Shavonne Mahajan will increase B LE strength 1/2 grade within 6 day(s). (4.)Pt. Will ambulate 100' with RW and CGA with stable O2 sat >90% within 6 days  ________________________________________________________________________________________________    PHYSICAL THERAPY: Initial Assessment, AM 1/18/2018  OBSERVATION: Hospital Day: 2  Payor: J Carlos Chaney / Plan: [address] / Product Type: Managed Care Medicare /      NAME/AGE/GENDER: Latrice Shaver is a 80 y.o. female   PRIMARY DIAGNOSIS: Hypoxia Acute respiratory failure with hypoxia ([address] Acute respiratory failure with hypoxia ([address]        ICD-10: Treatment Diagnosis:   · Generalized Muscle Weakness (M62.81)  · Difficulty in walking, Not elsewhere classified (R26.2)  · Other abnormalities of gait and mobility (R26.89)   Precaution/Allergies:  Methotrexate and Sulfa (sulfonamide antibiotics)      ASSESSMENT:     Ms. Shavonne Mahajan presents with decreased general strength and endurance, decreased standing balance and functional mobility. She has audible wheezing as I enter room. After we ambulated 5' , her O2 sats dropped to 88% on O2. She would benefit from further PT while here. She was hospitalized in dec.  And since then has had 24 hr sitters. She really wouldn't ambulate much and used a BSC for toileting. Prior to that illness, she had someone come in am and then in pm from 6-10. I feel that she could potentially benefit from rehab if that was option with her insurance, otherwise HH PT and 24 hr sitters would be my recommendation. She has the necessary DME at home. This section established at most recent assessment   PROBLEM LIST (Impairments causing functional limitations):  1. Decreased Strength  2. Decreased Transfer Abilities  3. Decreased Ambulation Ability/Technique  4. Decreased Balance  5. Decreased Activity Tolerance  6. Decreased Pacing Skills  7. Decreased Work Simplification/Energy Conservation Techniques  8. Increased Fatigue  9. Increased Shortness of Breath  10. Decreased Flexibility/Joint Mobility  11. Decreased [place] with Home Exercise Program   INTERVENTIONS PLANNED: (Benefits and precautions of physical therapy have been discussed with the patient.)  1. Balance Exercise  2. Bed Mobility  3. Gait Training  4. Home Exercise Program (HEP)  5. Range of Motion (ROM)  6. Therapeutic Activites  7. Therapeutic Exercise/Strengthening  8. Transfer Training  9. endurance activities     TREATMENT PLAN: Frequency/Duration: daily for duration of hospital stay  Rehabilitation Potential For Stated Goals: [id number] REHABILITATION/EQUIPMENT: (at time of discharge pending progress): Due to the probability of continued deficits (see above) this patient will likely need continued skilled physical therapy after discharge. Equipment:    has a TTB, BSC and RW in the home              HISTORY:   History of Present Injury/Illness (Reason for Referral): Admitted with SOB and wheezing that has been increasing since Mon. Was hospitalized in Dec. For similar episode  Past Medical History/Comorbidities:   Ms. Daria Garza  has a past medical history of Arthritis; Cancer [address] [address]  Exertional dyspnea; HBP (high blood pressure); Hypothyroid; PE (pulmonary embolism) (); and TB (pulmonary tuberculosis) (1930). She also has no past medical history of Adverse effect of anesthesia; Difficult intubation; Hematuria, microscopic; Kidney stone; Malignant hyperthermia due to anesthesia; Nausea & vomiting; or Pseudocholinesterase deficiency. Ms. Taylor Eisenberg  has a past surgical history that includes hx appendectomy (1940); hx tonsil and adenoidectomy (193); hx hip replacement (Left, 2007); hx  section; hx carpal tunnel release (Bilateral); and hx back surgery (2016). Social History/Living Environment:   Home Environment: Private residence  # Steps to Enter: 1  Rails to Enter: No  One/Two Story Residence: One story  Living Alone: Yes  Support Systems: Home care staff (has sitters 24 hrs/day)  Patient Expects to be Discharged to[de-identified] Private residence (recommend 24 hr supervision initially)  Current DME Used/Available at Home: Tub transfer bench, Walker, rolling, Commode, bedside  Tub or Shower Type: Tub/Shower combination  Prior Level of Function/Work/Activity:  She required assist with ADLs prior to admission and walked through home with RW. Since Dec., she has been dependent for ADLs, uses BSC and doesn't ambulate much at all  Dominant Side:         RIGHT   Number of Personal Factors/Comorbidities that affect the Plan of Care: 1-2: MODERATE COMPLEXITY   EXAMINATION:   Most Recent Physical Functioning:   Gross Assessment:  AROM: Generally decreased, functional (B LEs)  Strength: Generally decreased, functional (L hip 3-/5; R LE and L knee/ankle 3+-4/5)  Sensation: Intact (B LEs)               Posture:     Balance:  Sitting: Intact  Standing: Pull to stand; With support Bed Mobility:  Supine to Sit: Moderate assistance  Sit to Supine:  Moderate assistance  Wheelchair Mobility:     Transfers:  Sit to Stand: Minimum assistance  Stand to Sit: Minimum assistance  Gait:     Speed/Bessy: Slow  Gait Abnormalities: Antalgic;Decreased step clearance  Distance (ft): 5 Feet (ft)  Assistive Device:  (HHA of 2)  Ambulation - Level of Assistance: Minimal assistance  Interventions: Safety awareness training;Verbal cues      Body Structures Involved:  1. Lungs  2. Joints  3. Muscles Body Functions Affected:  1. Respiratory  2. Neuromusculoskeletal  3. Movement Related Activities and Participation Affected:  1. Mobility  2. Self Care   Number of elements that affect the Plan of Care: 4+: HIGH COMPLEXITY   CLINICAL PRESENTATION:   Presentation: Evolving clinical presentation with changing clinical characteristics: MODERATE COMPLEXITY   CLINICAL DECISION MAKIN [facility] Inpatient Short Form  How much difficulty does the patient currently have. .. Unable A Lot A Little None   1. Turning over in bed (including adjusting bedclothes, sheets and blankets)? [] 1   [] 2   [x] 3   [] 4   2. Sitting down on and standing up from a chair with arms ( e.g., wheelchair, bedside commode, etc.)   [] 1   [] 2   [x] 3   [] 4   3. Moving from lying on back to sitting on the side of the bed? [] 1   [x] 2   [] 3   [] 4   How much help from another person does the patient currently need. .. Total A Lot A Little None   4. Moving to and from a bed to a chair (including a wheelchair)? [] 1   [] 2   [x] 3   [] 4   5. Need to walk in hospital room? [] 1   [] 2   [x] 3   [] 4   6. Climbing 3-5 steps with a railing? [] 1   [x] 2   [] 3   [] 4   © , Trustees of [address] [place] [address], under license to [employer]. All rights reserved      Score:  Initial: 16 Most Recent: X (Date: -- )    Interpretation of Tool:  Represents activities that are increasingly more difficult (i.e. Bed mobility, Transfers, Gait). Score 24 23 22-20 19-15 14-10 9-7 6     Modifier CH CI CJ CK CL CM CN      ?  Mobility - Walking and Moving Around:     - CURRENT STATUS: CK - 40%-59% impaired, limited or restricted    - GOAL STATUS: CJ - 20%-39% impaired, limited or restricted    - D/C STATUS:  ---------------To be determined---------------  Payor: TER[employer] MEDICARE COMPLETE / Plan: [facility] MEDICARE COMPLETE / Product Type: Managed Care Medicare /      Medical Necessity:     · Patient demonstrates fair rehab potential due to higher previous functional level. Reason for Services/Other Comments:  · Patient continues to require present interventions due to patient's inability to perform functional mobility with CGA. Use of outcome tool(s) and clinical judgement create a POC that gives a: Questionable prediction of patient's progress: MODERATE COMPLEXITY            TREATMENT:   (In addition to Assessment/Re-Assessment sessions the following treatments were rendered)   Pre-treatment Symptoms/Complaints:  SOB, wheezing, hungry  Pain: Initial:   Pain Intensity 1: 0  Post Session:  0     Assessment/Reassessment only, no treatment provided today    Braces/Orthotics/Lines/Etc:   · telemetry lines  · O2 Device: Nasal cannula  Treatment/Session Assessment:    · Response to Treatment:  Tolerated with SOB, wheezing and decreased O2 sats  · Interdisciplinary Collaboration:   o Physical Therapist  o Occupational Therapist  o Physician  · After treatment position/precautions:   o Supine in bed  o Bed/Chair-wheels locked  o Bed in low position  o Caregiver at bedside  o Call light within reach  o RN notified  o Side rails x 2   · Compliance with Program/Exercises: Will assess as treatment progresses. · Recommendations/Intent for next treatment session: \"Next visit will focus on advancements to more challenging activities and reduction in assistance provided\".   Total Treatment Duration:  PT Patient Time In/Time Out  Time In: 0720  Time Out: 1210 [facility]

## 2018-01-18 NOTE — PROGRESS NOTES
Problem: Self Care Deficits Care Plan (Adult)  Goal: *Acute Goals and Plan of Care (Insert Text)  1. Patient will perform grooming with supervision. 2. Patient will perform Upper body dressing with minimal assist.   3. Patient will perform lower body dressing with moderate assist.  4. Patient will perform upper and lower body bathing with minimal to moderate assist  5. Patient will perform toilet transfers with CGA  6. Patient will participate in 30 + minutes of ADL/ therapeutic exercise/therapeutic activity with min rest breaks to increase activity tolerance for self care. 7. Patient will perform ADL functional mobility in room with CGA. Goals to be achieved in 7 days. OCCUPATIONAL THERAPY: Initial Assessment 1/18/2018  OBSERVATION: Hospital Day: 2  Payor: Jordin Kirby / Plan: [address] / Product Type: Managed Care Medicare /      NAME/AGE/GENDER: Tamiko Johnson is a 80 y.o. female   PRIMARY DIAGNOSIS:  Hypoxia Acute respiratory failure with hypoxia ([address] Acute respiratory failure with hypoxia ([address]        ICD-10: Treatment Diagnosis:    · Generalized Muscle Weakness (M62.81)  · Other lack of cordination (R27.8)  · Difficulty in walking, Not elsewhere classified (R26.2)   Precautions/Allergies:     Methotrexate and Sulfa (sulfonamide antibiotics)      ASSESSMENT:     Ms. Lydia Pathak admitted with above diagnosis; pt presents with decreased self care, functional mobility, strength and endurance. Pt would benefit from skilled OT to increase independence. This section established at most recent assessment   PROBLEM LIST (Impairments causing functional limitations):  1. Decreased Strength  2. Decreased ADL/Functional Activities  3. Decreased Transfer Abilities  4. Decreased Ambulation Ability/Technique  5. Decreased Balance  6.  Decreased Activity Tolerance   INTERVENTIONS PLANNED: (Benefits and precautions of occupational therapy have been discussed with the patient.)  1. Activities of daily living training  2. Adaptive equipment training  3. Balance training  4. Clothing management  5. Therapeutic activity  6. Therapeutic exercise     TREATMENT PLAN: Frequency/Duration: Follow patient 3x/week to address above goals. Rehabilitation Potential For Stated Goals: Good     RECOMMENDED REHABILITATION/EQUIPMENT: (at time of discharge pending progress): Due to the probability of continued deficits (see above) this patient will likely need continued skilled occupational therapy after discharge. Equipment:    None at this time              OCCUPATIONAL PROFILE AND HISTORY:   History of Present Injury/Illness (Reason for Referral):  Hypoxia Acute respiratory failure with hypoxia  Past Medical History/Comorbidities:   Ms. Paulette Morales  has a past medical history of Arthritis; Cancer ([facility] Exertional dyspnea; HBP (high blood pressure); Hypothyroid; PE (pulmonary embolism) (); and TB (pulmonary tuberculosis) (1930). She also has no past medical history of Adverse effect of anesthesia; Difficult intubation; Hematuria, microscopic; Kidney stone; Malignant hyperthermia due to anesthesia; Nausea & vomiting; or Pseudocholinesterase deficiency. Ms. Paulette Morales  has a past surgical history that includes hx appendectomy (1940); hx tonsil and adenoidectomy (193); hx hip replacement (Left, 2007); hx  section; hx carpal tunnel release (Bilateral); and hx back surgery (2016).   Social History/Living Environment:   Home Environment: Private residence  # Steps to Enter: 1  Rails to Enter: No  One/Two Story Residence: One story  Living Alone: Yes  Support Systems: Home care staff (has sitters 24 hrs/day)  Patient Expects to be Discharged to[de-identified] Private residence (recommend 24 hr supervision initially)  Current DME Used/Available at Home: Tub transfer bench, Walker, rolling, Commode, bedside  Tub or Shower Type: Tub/Shower combination  Prior Level of Function/Work/Activity:  Assist with ADLs and ambulation   Number of Personal Factors/Comorbidities that affect the Plan of Care: Brief history (0):  LOW COMPLEXITY   ASSESSMENT OF OCCUPATIONAL PERFORMANCE[de-identified]   Activities of Daily Living:           Basic ADLs (From Assessment) Complex ADLs (From Assessment)   Basic ADL  Feeding: Setup  Oral Facial Hygiene/Grooming: Minimum assistance  Bathing: Moderate assistance  Upper Body Dressing: Minimum assistance  Lower Body Dressing: Maximum assistance  Toileting: Maximum assistance     Grooming/Bathing/Dressing Activities of Daily Living     Cognitive Retraining  Safety/Judgement: Fall prevention                 Functional Transfers  Toilet Transfer : Minimum assistance  Shower Transfer: Minimum assistance     Bed/Mat Mobility  Supine to Sit: Moderate assistance  Sit to Supine: Moderate assistance  Sit to Stand: Minimum assistance  Bed to Chair: Minimum assistance  Scooting: Moderate assistance       Most Recent Physical Functioning:   Gross Assessment:  AROM: Generally decreased, functional (BUE)  Strength: Generally decreased, functional (BUE)  Coordination: Generally decreased, functional (BUE)  Tone: Normal  Sensation: Intact               Posture:     Balance:  Sitting: Intact  Standing: Pull to stand; With support Bed Mobility:  Supine to Sit: Moderate assistance  Sit to Supine: Moderate assistance  Scooting: Moderate assistance  Wheelchair Mobility:     Transfers:  Sit to Stand: Minimum assistance  Stand to Sit: Minimum assistance  Bed to Chair: Minimum assistance              Patient Vitals for the past 6 hrs:   SpO2 O2 Flow Rate (L/min)   01/18/18 0344 94 % 2 l/min       Mental Status  Neurologic State: Alert  Orientation Level: Oriented to person, Oriented to place  Cognition: Follows commands  Perception: Appears intact  Perseveration: No perseveration noted  Safety/Judgement: Fall prevention                          Physical Skills Involved:  1. Balance  2. Strength  3.  Activity Tolerance Cognitive Skills Affected (resulting in the inability to perform in a timely and safe manner): 1. none Psychosocial Skills Affected:  1. none   Number of elements that affect the Plan of Care: 1-3:  LOW COMPLEXITY   CLINICAL DECISION MAKIN[address] [place] [address] AM-PAC 6 Clicks   Daily Activity Inpatient Short Form  How much help from another person does the patient currently need. .. Total A Lot A Little None   1. Putting on and taking off regular lower body clothing? [] 1   [x] 2   [] 3   [] 4   2. Bathing (including washing, rinsing, drying)? [] 1   [x] 2   [] 3   [] 4   3. Toileting, which includes using toilet, bedpan or urinal?   [] 1   [x] 2   [] 3   [] 4   4. Putting on and taking off regular upper body clothing? [] 1   [x] 2   [] 3   [] 4   5. Taking care of personal grooming such as brushing teeth? [] 1   [] 2   [x] 3   [] 4   6. Eating meals? [] 1   [] 2   [x] 3   [] 4   © , Trustees of [address] [place] [address], under license to [employer]. All rights reserved      Score:  Initial: 14 Most Recent: X (Date: -- )    Interpretation of Tool:  Represents activities that are increasingly more difficult (i.e. Bed mobility, Transfers, Gait). Score 24 23 22-20 19-15 14-10 9-7 6     Modifier CH CI CJ CK CL CM CN      ? Self Care:     - CURRENT STATUS: CL - 60%-79% impaired, limited or restricted    - GOAL STATUS: CK - 40%-59% impaired, limited or restricted    - D/C STATUS:  CL - 60%-79% impaired, limited or restricted  Payor: [address] [place] [address] / Plan: [address] / Product Type: [employer] Medicare /      Medical Necessity:     · Patient is expected to demonstrate progress in strength, balance, coordination and functional technique to increase independence with self care and functional mobility. Reason for Services/Other Comments:  · Patient continues to require skilled intervention due to decrease self care and mobility.    Use of outcome tool(s) and clinical judgement create a POC that gives a: LOW COMPLEXITY         TREATMENT:   (In addition to Assessment/Re-Assessment sessions the following treatments were rendered)     Pre-treatment Symptoms/Complaints:  No complaints  Pain: Initial:   Pain Intensity 1: 0  Post Session:  0     Assessment/Reassessment only, no treatment provided today    Braces/Orthotics/Lines/Etc:   · telemetry  · O2 Device: Nasal cannula  Treatment/Session Assessment:    · Response to Treatment:  Tolerated well  · Interdisciplinary Collaboration:   o Physical Therapist  o Registered Nurse  · After treatment position/precautions:   o Supine in bed  o Bed/Chair-wheels locked  o Bed in low position  o Caregiver at bedside  o Call light within reach  o RN notified   · Compliance with Program/Exercises: compliant all of the time. · Recommendations/Intent for next treatment session: \"Next visit will focus on advancements to more challenging activities and reduction in assistance provided\".   Total Treatment Duration:  OT Patient Time In/Time Out  Time In: 0730  Time Out: [address] [facility] OT

## 2018-01-18 NOTE — PROGRESS NOTES
Hospitalist Progress Note     Admit Date:  2018  4:29 PM   Name:  Rodrigo Vasquez   Age:  80 y.o.  :  10/30/1924   MRN:  [id number]   PCP:  Michelle Nowak MD  Treatment Team: Attending Provider: Jimbo Echevarria MD; Consulting Provider: Tyler Roche MD; Utilization Review: Dalia Kruger RN; Consulting Provider: Eusebia Farnsworth MD    Subjective:   Pt is a 79 y/o F with pulm fibrosis who was admitted with wheezing, stridor, and hypoxia. ENT consulted in ER and laryngoscopy showed arytenoid inflammation, possibly from GERD. Also recommended SLP consult for dysphagia. Pulmonology was consulted for pulm fibrosis.  - feeling about the same as last night. On 2L NC currently. Still with coarse BS bilaterally and exp wheezing. No CP. Objective:   Patient Vitals for the past 24 hrs:   Temp Pulse Resp BP SpO2   18 1127 - - - - 98 %   18 1025 98.2 °F (36.8 °C) (!) 105 16 (!) 159/100 99 %   18 0836 - 98 16 (!) 161/98 97 %   18 0700 98 °F (36.7 °C) 97 16 (!) 152/91 96 %   18 0344 - - - - 94 %   18 0200 - - - 168/79 90 %   18 0130 - - - 173/72 (!) 83 %   18 0030 - - - 172/87 (!) 89 %   18 0000 - - - 161/79 (!) 84 %   18 2348 - 99 - 185/89 97 %   18 2341 - 99 - 185/89 -   18 2223 - - - - 98 %   18 2205 - 92 - 171/70 90 %   18 1825 - - - - 100 %   18 1725 - - - - 97 %   18 1725 - - - - 99 %   18 1633 98 °F (36.7 °C) 94 24 139/63 100 %     Oxygen Therapy  O2 Sat (%): 98 % (18)  Pulse via Oximetry: 79 beats per minute (18)  O2 Device: Nasal cannula (18)  O2 Flow Rate (L/min): 2 l/min (decreased to 1) (18)  No intake or output data in the 24 hours ending 18 1134      General:    Well nourished. Alert. CV:   RRR. No murmur, rub, or gallop. Lungs:   coarse BS bilaterally and exp wheezing  Abdomen:   Soft, nontender, nondistended. Extremities: Warm and dry. No cyanosis or edema. Skin:     No rashes or jaundice. Data Review:  I have reviewed all labs, meds, telemetry events, and studies from the last 24 hours. Recent Results (from the past 24 hour(s))   CBC WITH AUTOMATED DIFF    Collection Time: 01/17/18  5:05 PM   Result Value Ref Range    WBC 9.8 4.3 - 11.1 K/uL    RBC 4.18 4.05 - 5.25 M/uL    HGB 11.3 (L) 11.7 - 15.4 g/dL    HCT 36.5 35.8 - 46.3 %    MCV 87.3 79.6 - 97.8 FL    MCH 27.0 26.1 - 32.9 PG    MCHC 31.0 (L) 31.4 - 35.0 g/dL    RDW 19.6 (H) 11.9 - 14.6 %    PLATELET 048 690 - 683 K/uL    MPV 11.0 10.8 - 14.1 FL    DF MANUAL      NEUTROPHILS 76 43 - 78 %    LYMPHOCYTES 15 13 - 44 %    MONOCYTES 7 4.0 - 12.0 %    EOSINOPHILS 2 0.5 - 7.8 %    BASOPHILS 0 0.0 - 2.0 %    IMMATURE GRANULOCYTES 1 0.0 - 5.0 %    ABS. NEUTROPHILS 7.4 1.7 - 8.2 K/UL    ABS. LYMPHOCYTES 1.5 0.5 - 4.6 K/UL    ABS. MONOCYTES 0.7 0.1 - 1.3 K/UL    ABS. EOSINOPHILS 0.2 0.0 - 0.8 K/UL    ABS. BASOPHILS 0.0 0.0 - 0.2 K/UL    ABS. IMM. GRANS. 0.1 0.0 - 0.5 K/UL    RBC COMMENTS NORMOCYTIC/NORMOCHROMIC      WBC COMMENTS Result Confirmed By Smear      PLATELET COMMENTS ADEQUATE     METABOLIC PANEL, COMPREHENSIVE    Collection Time: 01/17/18  5:05 PM   Result Value Ref Range    Sodium 138 136 - 145 mmol/L    Potassium 5.1 3.5 - 5.1 mmol/L    Chloride 99 98 - 107 mmol/L    CO2 29 21 - 32 mmol/L    Anion gap 10 7 - 16 mmol/L    Glucose 174 (H) 65 - 100 mg/dL    BUN 31 (H) 8 - 23 MG/DL    Creatinine 1.68 (H) 0.6 - 1.0 MG/DL    GFR est AA 37 (L) >60 ml/min/1.73m2    GFR est non-AA 30 (L) >60 ml/min/1.73m2    Calcium 8.4 8.3 - 10.4 MG/DL    Bilirubin, total 0.2 0.2 - 1.1 MG/DL    ALT (SGPT) 17 12 - 65 U/L    AST (SGOT) 30 15 - 37 U/L    Alk.  phosphatase 74 50 - 136 U/L    Protein, total 7.5 6.3 - 8.2 g/dL    Albumin 2.9 (L) 3.2 - 4.6 g/dL    Globulin 4.6 (H) 2.3 - 3.5 g/dL    A-G Ratio 0.6 (L) 1.2 - 3.5     BNP    Collection Time: 01/17/18  5:05 PM   Result Value Ref Range     pg/mL   TROPONIN I    Collection Time: 01/17/18  5:05 PM   Result Value Ref Range    Troponin-I, Qt. 0.07 (H) 0.02 - 0.05 NG/ML   EKG, 12 LEAD, INITIAL    Collection Time: 01/17/18  5:17 PM   Result Value Ref Range    Ventricular Rate 97 BPM    Atrial Rate 98 BPM    QRS Duration 84 ms    Q-T Interval 370 ms    QTC Calculation (Bezet) 469 ms    Calculated R Axis 97 degrees    Calculated T Axis 67 degrees    Diagnosis       Atrial fibrillation  Rightward axis  Cannot rule out Anterior infarct , age undetermined  Abnormal ECG  When compared with ECG of 23-DEC-2017 14:50,  No significant change was found  Confirmed by Zulay Velazquez MD (), JOIE GARIBAY ([id number]) on 1/18/2018 3:51:87 AM     METABOLIC PANEL, BASIC    Collection Time: 01/18/18  4:05 AM   Result Value Ref Range    Sodium 135 (L) 136 - 145 mmol/L    Potassium 4.2 3.5 - 5.1 mmol/L    Chloride 97 (L) 98 - 107 mmol/L    CO2 26 21 - 32 mmol/L    Anion gap 12 7 - 16 mmol/L    Glucose 211 (H) 65 - 100 mg/dL    BUN 29 (H) 8 - 23 MG/DL    Creatinine 1.86 (H) 0.6 - 1.0 MG/DL    GFR est AA 33 (L) >60 ml/min/1.73m2    GFR est non-AA 27 (L) >60 ml/min/1.73m2    Calcium 8.4 8.3 - 10.4 MG/DL   CBC W/O DIFF    Collection Time: 01/18/18  4:05 AM   Result Value Ref Range    WBC 10.2 4.3 - 11.1 K/uL    RBC 4.15 4.05 - 5.25 M/uL    HGB 11.2 (L) 11.7 - 15.4 g/dL    HCT 36.2 35.8 - 46.3 %    MCV 87.2 79.6 - 97.8 FL    MCH 27.0 26.1 - 32.9 PG    MCHC 30.9 (L) 31.4 - 35.0 g/dL    RDW 19.2 (H) 11.9 - 14.6 %    PLATELET 241 568 - 702 K/uL    MPV 10.9 10.8 - 14.1 FL   INFLUENZA A & B AG (RAPID TEST)    Collection Time: 01/18/18  8:15 AM   Result Value Ref Range    Influenza A Ag NEGATIVE  NEG      Influenza B Ag NEGATIVE  NEG          All Micro Results     Procedure Component Value Units Date/Time    INFLUENZA A & B AG (RAPID TEST) [[id number]] Collected:  01/18/18 0815    Order Status:  Completed Specimen:  Nasopharyngeal from Nasal washing Updated:  01/18/18 7048 Influenza A Ag NEGATIVE          NEGATIVE FOR THE PRESENCE OF INFLUENZA A ANTIGEN  INFECTION DUE TO INFLUENZA A CANNOT BE RULED OUT. BECAUSE THE ANTIGEN PRESENT IN THE SAMPLE MAY BE BELOW  THE DETECTION LIMIT OF THE TEST. A NEGATIVE TEST IS PRESUMPTIVE AND IT IS RECOMMENDED THAT THESE RESULTS BE CONFIRMED BY VIRAL CULTURE OR AN FDA-CLEARED INFLUENZA A AND B MOLECULAR ASSAY. Influenza B Ag NEGATIVE          NEGATIVE FOR THE PRESENCE OF INFLUENZA B ANTIGEN  INFECTION DUE TO INFLUENZA B CANNOT BE RULED OUT. BECAUSE THE ANTIGEN PRESENT IN THE SAMPLE MAY BE BELOW  THE DETECTION LIMIT OF THE TEST. A NEGATIVE TEST IS PRESUMPTIVE AND IT IS RECOMMENDED THAT THESE RESULTS BE CONFIRMED BY VIRAL CULTURE OR AN FDA-CLEARED INFLUENZA A AND B MOLECULAR ASSAY.                Current Meds:  Current Facility-Administered Medications   Medication Dose Route Frequency    hydrALAZINE (APRESOLINE) 20 mg/mL injection 20 mg  20 mg IntraVENous Q6H PRN    aspirin delayed-release tablet 81 mg  81 mg Oral DAILY    azithromycin (ZITHROMAX) tablet 500 mg  500 mg Oral DAILY    budesonide (PULMICORT) 500 mcg/2 ml nebulizer suspension  500 mcg Nebulization BID RT    dilTIAZem CD (CARDIZEM CD) capsule 120 mg  120 mg Oral DAILY    furosemide (LASIX) tablet 40 mg  40 mg Oral DAILY    levothyroxine (SYNTHROID) tablet 75 mcg  75 mcg Oral ACB    magnesium oxide (MAG-OX) tablet 400 mg  400 mg Oral BID    metoprolol tartrate (LOPRESSOR) tablet 50 mg  50 mg Oral Q12H    mirabegron ER (MYRBETRIQ) tablet 25 mg (Patient Supplied)  25 mg Oral DAILY    sodium chloride (NS) flush 5-10 mL  5-10 mL IntraVENous Q8H    sodium chloride (NS) flush 5-10 mL  5-10 mL IntraVENous PRN    predniSONE (DELTASONE) tablet 40 mg  40 mg Oral DAILY WITH BREAKFAST    acetaminophen (TYLENOL) tablet 650 mg  650 mg Oral Q4H PRN    ondansetron (ZOFRAN) injection 4 mg  4 mg IntraVENous Q4H PRN    heparin (porcine) injection 5,000 Units  5,000 Units SubCUTAneous Q8H    albuterol-ipratropium (DUO-NEB) 2.5 MG-0.5 MG/3 ML  3 mL Nebulization Q4H RT       Other Studies (last 24 hours):  Ct Neck Soft Tissue W Cont    Result Date: 1/17/2018  CT NECK WITH CONTRAST HISTORY: Stridor  COMPARISON: None TECHNIQUE: Helical axial acquisition with multiplanar reconstruction. CONTRAST: 100 cc of Isovue-370. Dose reduction techniques used: Automated exposure control, adjustment of the mAs and/or kVp according to patient's size, and iterative reconstruction techniques. FINDINGS: *  SKULL BASE:  Unremarkable. *  SINUSES (partially included) : The visualized paranasal sinuses are clear. *  MASTOIDS: Unremarkable. *  PAROTID GLANDS: Unremarkable. *  NASOPHARYNX:  Unremarkable. *  PARAPHARYNGEAL SPACE:  Unremarkable. *  ORAL CAVITY AND OROPHARYNX: Unremarkable. *   SPACE:  Unremarkable. *  SUBMANDIBULAR GLANDS: Unremarkable. *  HYPOPHARYNX AND LARYNX: Unremarkable. *  THYROID GLAND: Unremarkable. *  CAROTID SPACE:  Unremarkable. 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 *  SPINE: Unremarkable. *  SUPERFICIAL LESION: Unremarkable. LYMPH NODES: *  LEVEL LYMPHS: Unremarkable. *  NONLEVEL LYMPH NODES: Unremarkable. 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 Date of Dictation: 1/17/2018 11:42 PM     Xr Chest Port    Result Date: 1/17/2018  Portable chest: History: Severe shortness of breath today, history of pulmonary fibrosis. Comparison: 12/24/2017 Findings: A single view of the chest was obtained at 1651 hours. The cardiac and mediastinal silhouette are normal in size and configuration. Lung volumes are mildly diminished with scattered coarse interstitial opacities, unchanged. There are no superimposed airspace consolidations. There are no pleural effusions. The pulmonary vascularity is within normal limits. Impression: Chronic changes without evidence of acute pulmonary process. Assessment and Plan:     Hospital Problems as of 1/18/2018  Date Reviewed: 12/25/2017          Codes Class Noted - Resolved POA    * (Principal)Acute respiratory failure with hypoxia ([facility] ICD-10-CM: J96.01  ICD-9-CM: 518.81  1/17/2018 - Present Yes        URI (upper respiratory infection) ICD-10-CM: J06.9  ICD-9-CM: 465.9  1/17/2018 - Present Yes        Pulmonary fibrosis (HCC) (Chronic) ICD-10-CM: J84.10  ICD-9-CM: 515  12/25/2017 - Present Yes        Paroxysmal atrial fibrillation (HCC) (Chronic) ICD-10-CM: I48.0  ICD-9-CM: 427.31  12/23/2017 - Present Yes        Chronic diastolic congestive heart failure (HCC) (Chronic) ICD-10-CM: I50.32  ICD-9-CM: 428.32, 428.0  12/23/2017 - Present Yes        Stage 3 chronic kidney disease (Chronic) ICD-10-CM: N18.3  ICD-9-CM: 585.3  12/23/2017 - Present Yes        Mild persistent asthma without complication (Chronic) [address]: J45.30  ICD-9-CM: 493.90  2/16/2017 - Present Yes              PLAN:    - URI with hypoxia   ENT consulted in ER for stridor and performed laryngoscopy which showed only arytenoid inflammation from GERD. Pt improved with racemic epinephrine, not much improvement with albuterol nebulizer. cont prednisone. Holding abx.     - Pulmonary fibrosis  pulm consult pending; appreciate help. May need home oxygen    -Dysphagia  SLP consult per ENT recs     - Atrial fibrillation  Mild tachycardia likely 2/2 hypoxia/illness. No home anticoagulation. Cont home meds     - CKD 3  Stable. monitor    DC planning/Dispo:  Place ppd.   Consulted SW/PT/OT  DVT ppx:  heparin    Signed:  Dede Duarte MD

## 2018-01-18 NOTE — PROGRESS NOTES
TRANSFER - IN REPORT:    Verbal report received from [address] on Lyndsey Brody  being received from ED for routine progression of care      Report consisted of patients Situation, Background, Assessment and   Recommendations(SBAR). Information from the following report(s) SBAR, Kardex, ED Summary, Intake/Output, MAR, Accordion, Recent Results and Med Rec Status was reviewed with the receiving nurse. Opportunity for questions and clarification was provided. Assessment completed upon patients arrival to unit and care assumed.

## 2018-01-18 NOTE — ROUTINE PROCESS
TRANSFER - OUT REPORT:    Verbal report given to [facility] RN on Denver Kirschner  being transferred to [facility] for routine progression of care       Report consisted of patients Situation, Background, Assessment and   Recommendations(SBAR). Information from the following report(s) ED Summary was reviewed with the receiving nurse. Lines:   Peripheral IV 01/18/18 Right Antecubital (Active)   Site Assessment Clean, dry, & intact 1/18/2018  6:57 AM   Phlebitis Assessment 0 1/18/2018  6:57 AM   Infiltration Assessment 0 1/18/2018  6:57 AM   Dressing Status Clean, dry, & intact 1/18/2018  6:57 AM        Opportunity for questions and clarification was provided.

## 2018-01-18 NOTE — ED NOTES
Pt brief and linen changed. Aundrea care performed. Pt made comfortable in the bed. Home health caregiver assisted and remains at bedside.

## 2018-01-18 NOTE — PROGRESS NOTES
Problem: Interdisciplinary Rounds  Goal: Interdisciplinary Rounds  Interdisciplinary team rounds were held 1/18/2018 with the following team members:Care Management, Nursing, Nutrition, Patient Relations, Pharmacy and Physician. Plan of care discussed. See clinical pathway and/or care plan for interventions and desired outcomes.

## 2018-01-19 PROBLEM — J96.21 ACUTE ON CHRONIC RESPIRATORY FAILURE WITH HYPOXIA (HCC): Status: ACTIVE | Noted: 2018-01-01

## 2018-01-19 PROBLEM — J96.11 CHRONIC RESPIRATORY FAILURE WITH HYPOXIA (HCC): Status: ACTIVE | Noted: 2018-01-01

## 2018-01-19 PROBLEM — R13.12 OROPHARYNGEAL DYSPHAGIA: Status: ACTIVE | Noted: 2018-01-01

## 2018-01-19 PROBLEM — R13.12 OROPHARYNGEAL DYSPHAGIA: Chronic | Status: ACTIVE | Noted: 2018-01-01

## 2018-01-19 NOTE — PROGRESS NOTES
Hospitalist Progress Note     Admit Date:  2018  4:29 PM   Name:  Patrice Vann   Age:  80 y.o.  :  10/30/1924   MRN:  [id number]   PCP:  Kishor Beckford MD  Treatment Team: Attending Provider: Kenney Treviño MD; Consulting Provider: Jay Mckeon MD; Utilization Review: Ree England RN; Consulting Provider: uLzmaria Weathers MD; Utilization Review: Diana Berry RN    Subjective:   Pt is a 79 y/o F with pulm fibrosis who was admitted with wheezing, stridor, and hypoxia. ENT consulted in ER and laryngoscopy showed arytenoid inflammation, possibly from GERD. Also recommended SLP consult for dysphagia. Pulmonology was consulted for pulm fibrosis.  - feeling about the same as last night. On 2L NC currently. Still with coarse BS bilaterally and exp wheezing. No CP.      - pt about the same. Still SOB. Wheezing improved. No cough. Objective:     Patient Vitals for the past 24 hrs:   Temp Pulse Resp BP SpO2   18 1145 97.7 °F (36.5 °C) 97 17 158/78 94 %   18 0744 98 °F (36.7 °C) 85 22 164/83 95 %   18 0451 97.3 °F (36.3 °C) 98 18 166/80 93 %   18 - - - - 95 %   18 2215 98.6 °F (37 °C) 73 18 157/71 94 %   18 1930 95.4 °F (35.2 °C) 98 22 156/90 93 %   18 1628 - - - - 97 %   18 1518 97.9 °F (36.6 °C) 71 20 151/69 93 %     Oxygen Therapy  O2 Sat (%): 94 % (18 1145)  Pulse via Oximetry: 75 beats per minute (18)  O2 Device: Nasal cannula (18)  O2 Flow Rate (L/min): 2 l/min (18)    Intake/Output Summary (Last 24 hours) at 18 1252  Last data filed at 01/19/18 1930   Gross per 24 hour   Intake                0 ml   Output              250 ml   Net             -250 ml         General:    Well nourished. Alert. CV:   RRR. No murmur, rub, or gallop. Lungs:   coarse BS bilaterally and exp wheezing  Abdomen:   Soft, nontender, nondistended. Extremities: Warm and dry.   No cyanosis or edema. Skin:     No rashes or jaundice. Data Review:  I have reviewed all labs, meds, telemetry events, and studies from the last 24 hours. No results found for this or any previous visit (from the past 24 hour(s)). All Micro Results     Procedure Component Value Units Date/Time    INFLUENZA A & B AG (RAPID TEST) [[id number]] Collected:  01/18/18 0815    Order Status:  Completed Specimen:  Nasopharyngeal from Nasal washing Updated:  01/18/18 0854     Influenza A Ag NEGATIVE          NEGATIVE FOR THE PRESENCE OF INFLUENZA A ANTIGEN  INFECTION DUE TO INFLUENZA A CANNOT BE RULED OUT. BECAUSE THE ANTIGEN PRESENT IN THE SAMPLE MAY BE BELOW  THE DETECTION LIMIT OF THE TEST. A NEGATIVE TEST IS PRESUMPTIVE AND IT IS RECOMMENDED THAT THESE RESULTS BE CONFIRMED BY VIRAL CULTURE OR AN FDA-CLEARED INFLUENZA A AND B MOLECULAR ASSAY. Influenza B Ag NEGATIVE          NEGATIVE FOR THE PRESENCE OF INFLUENZA B ANTIGEN  INFECTION DUE TO INFLUENZA B CANNOT BE RULED OUT. BECAUSE THE ANTIGEN PRESENT IN THE SAMPLE MAY BE BELOW  THE DETECTION LIMIT OF THE TEST. A NEGATIVE TEST IS PRESUMPTIVE AND IT IS RECOMMENDED THAT THESE RESULTS BE CONFIRMED BY VIRAL CULTURE OR AN FDA-CLEARED INFLUENZA A AND B MOLECULAR ASSAY.                Current Meds:  Current Facility-Administered Medications   Medication Dose Route Frequency    predniSONE (DELTASONE) tablet 60 mg  60 mg Oral DAILY WITH BREAKFAST    lisinopril (PRINIVIL, ZESTRIL) tablet 10 mg  10 mg Oral DAILY    hydrALAZINE (APRESOLINE) 20 mg/mL injection 20 mg  20 mg IntraVENous Q6H PRN    PPD read reminder  1 Each Other Q24H    aspirin delayed-release tablet 81 mg  81 mg Oral DAILY    azithromycin (ZITHROMAX) tablet 500 mg  500 mg Oral DAILY    budesonide (PULMICORT) 500 mcg/2 ml nebulizer suspension  500 mcg Nebulization BID RT    dilTIAZem CD (CARDIZEM CD) capsule 120 mg  120 mg Oral DAILY    furosemide (LASIX) tablet 40 mg  40 mg Oral DAILY    levothyroxine (SYNTHROID) tablet 75 mcg  75 mcg Oral ACB    magnesium oxide (MAG-OX) tablet 400 mg  400 mg Oral BID    metoprolol tartrate (LOPRESSOR) tablet 50 mg  50 mg Oral Q12H    mirabegron ER (MYRBETRIQ) tablet 25 mg (Patient Supplied)  25 mg Oral DAILY    sodium chloride (NS) flush 5-10 mL  5-10 mL IntraVENous Q8H    sodium chloride (NS) flush 5-10 mL  5-10 mL IntraVENous PRN    acetaminophen (TYLENOL) tablet 650 mg  650 mg Oral Q4H PRN    ondansetron (ZOFRAN) injection 4 mg  4 mg IntraVENous Q4H PRN    heparin (porcine) injection 5,000 Units  5,000 Units SubCUTAneous Q8H    albuterol-ipratropium (DUO-NEB) 2.5 MG-0.5 MG/3 ML  3 mL Nebulization Q4H RT       Other Studies (last 24 hours):  No results found.     Assessment and Plan:     Hospital Problems as of 1/20/2018  Date Reviewed: 12/25/2017          Codes Class Noted - Resolved POA    Oropharyngeal dysphagia (Chronic) ICD-10-CM: R13.12  ICD-9-CM: 787.22  1/19/2018 - Present Yes    Overview Signed 1/19/2018  8:15 AM by Ernst Hoffmann MD     Per SLP 1/18/18 modified diet, mechanical soft with nectar thickened liquids             * (Principal)Acute on chronic respiratory failure with hypoxia ([facility] [address] ICD-10-CM: J96.21  ICD-9-CM: 518.84, 799.02  1/17/2018 - Present Yes        URI (upper respiratory infection) ICD-10-CM: J06.9  ICD-9-CM: 465.9  1/17/2018 - Present Yes        Pulmonary fibrosis (HCC) (Chronic) ICD-10-CM: J84.10  ICD-9-CM: 919  12/25/2017 - Present Yes        Paroxysmal atrial fibrillation (HCC) (Chronic) ICD-10-CM: I48.0  ICD-9-CM: 427.31  12/23/2017 - Present Yes        Chronic diastolic congestive heart failure (HCC) (Chronic) ICD-10-CM: I50.32  ICD-9-CM: 428.32, 428.0  12/23/2017 - Present Yes        Stage 3 chronic kidney disease (Chronic) ICD-10-CM: N18.3  ICD-9-CM: 585.3  12/23/2017 - Present Yes        Mild persistent asthma without complication (Chronic) DTT-10-ZR: J45.30  ICD-9-CM: 493.90  2/16/2017 - Present Yes PLAN:    - URI with hypoxia   ENT consulted in ER for stridor and performed laryngoscopy which showed only arytenoid inflammation from GERD. Pt improved with racemic epinephrine, not much improvement with albuterol nebulizer. cont prednisone. Holding abx.     - Pulmonary fibrosis  pulm consult pending; appreciate help. May need home oxygen    -Dysphagia  SLP consult per ENT recs     - Atrial fibrillation  Mild tachycardia likely 2/2 hypoxia/illness. No home anticoagulation. Cont home meds     - CKD 3  Stable. monitor    DC planning/Dispo:  Place ppd.   Consulted SW/PT/OT  DVT ppx:  heparin    Signed:  Scott Kumar MD

## 2018-01-19 NOTE — PROGRESS NOTES
Sw called pt's son back again. Md is holding d/c and asking for RT interventions. Son working on lining up his caregivers as well as he was informed about [facility] Rn, PT, ST and home oxygen. Sw to follow up tomorrow. Dolly Councilman        Rn informed pt d/c today. Sw reviewed chart and spoke with pt's son who is currently sick himself. Son was not aware of d/c and stated he has spoken to no one. Son quite surprised by the news from [place]. Sw noted Rn notes of extreme dyspnea with exertion and Sats in the 80%. PT,ST and Rn needed at home with home health care. Pt is confused and lives alone. Son informed pt has [facility] 24 hr care. Ref made to [address]. Oxygen need will have to be assessed by respiratory (requiring 2-3 liters).  Dolly Councilman

## 2018-01-19 NOTE — PROGRESS NOTES
Shift assessment complete. Pt resting quietly in bed. No signs of acute distress. Resp even and unlabored. Audible wheezing noted. Pt with O2 in at 2lnc. Call light within reach. Pt instructed to call for assistance.

## 2018-01-19 NOTE — DISCHARGE SUMMARY
Hospitalist Discharge Summary     Admit Date:  2018  4:29 PM   Name:  Emil Oreilly   Age:  80 y.o.  :  10/30/1924   MRN:  [id number]   PCP:  Tino Bain MD  Treatment Team: Attending Provider: Lukasz Connell MD; Consulting Provider: Nazia Pennington MD; Utilization Review: Inderjit Whitaker RN; Consulting Provider: Roscoe Faith MD; Utilization Review: Lissette Hassan RN    Problem List for this Hospitalization:  Hospital Problems as of 2018  Date Reviewed: 2017          Codes Class Noted - Resolved POA    Oropharyngeal dysphagia (Chronic) ICD-10-CM: R13.12  ICD-9-CM: 787.22  2018 - Present Yes    Overview Signed 2018  8:15 AM by Maira Byrd MD     Per SLP 18 modified diet, mechanical soft with nectar thickened liquids             * (Principal)Acute on chronic respiratory failure with hypoxia ([facility] ICD-10-CM: J96.21  ICD-9-CM: 518.84, 799.02  2018 - Present Yes        URI (upper respiratory infection) ICD-10-CM: J06.9  ICD-9-CM: 465.9  2018 - Present Yes        Pulmonary fibrosis ([facility] [address] (Chronic) ICD-10-CM: J84.10  ICD-9-CM: 427  2017 - Present Yes        Paroxysmal atrial fibrillation ([facility] [address] (Chronic) ICD-10-CM: I48.0  ICD-9-CM: 427.31  2017 - Present Yes        Chronic diastolic congestive heart failure ([facility] [address] (Chronic) ICD-10-CM: I50.32  ICD-9-CM: 428.32, 428.0  2017 - Present Yes        Stage 3 chronic kidney disease (Chronic) ICD-10-CM: N18.3  ICD-9-CM: 585.3  2017 - Present Yes        Mild persistent asthma without complication (Chronic) TYB-61-CY: J45.30  ICD-9-CM: 493.90  2017 - Present Yes                Admission HPI from 2018:    \" Emil Oreilly is a 80y.o. year-old female with a past medical history of pulmonary fibrosis per report who presents to the ER with complaint of shortness of breath and loud wheezing for the past four days.  The patient was hospitalized with sepsis in December and has been more short of breath from her previous baseline since her admission, requiring 24h care at home and having dyspnea from walking from one room to another. She admits to coughing up some phlegm, denies any fevers, chills, nausea, vomiting, constipation, diarrhea, chest pain. \"    Hospital Course:  Pt is a 79 y/o F with pulm fibrosis who was admitted with wheezing, stridor, and hypoxia. ENT consulted in ER and laryngoscopy showed arytenoid inflammation, possibly from GERD. Also recommended SLP consult for dysphagia and they recommended modified diet. she remained stable on oxygen and labs unremarkable. CKD at baseline. PT said she is appropriate for home health. She may have a URI but likely viral and do not think she needs abx. Home oxygen will be arranged for her pulmonary fibrosis and outpatient pulmonary referral made. She probably has chronic hypoxia and didn't know it. Lisinopril increased and norvasc added to better control HTN; needs to follow up with PCP for this    Follow up instructions and discharge meds at bottom of this note. Plan was discussed with pt. All questions answered. Patient was stable at time of discharge. Diagnostic Imaging/Tests:   Ct Neck Soft Tissue W Cont    Result Date: 1/17/2018  CT NECK WITH CONTRAST HISTORY: Stridor  COMPARISON: None TECHNIQUE: Helical axial acquisition with multiplanar reconstruction. CONTRAST: 100 cc of Isovue-370. Dose reduction techniques used: Automated exposure control, adjustment of the mAs and/or kVp according to patient's size, and iterative reconstruction techniques. FINDINGS: *  SKULL BASE:  Unremarkable. *  SINUSES (partially included) : The visualized paranasal sinuses are clear. *  MASTOIDS: Unremarkable. *  PAROTID GLANDS: Unremarkable. *  NASOPHARYNX:  Unremarkable. *  PARAPHARYNGEAL SPACE:  Unremarkable. *  ORAL CAVITY AND OROPHARYNX: Unremarkable. *   SPACE:  Unremarkable. *  SUBMANDIBULAR GLANDS: Unremarkable.  * HYPOPHARYNX AND LARYNX: Unremarkable. *  THYROID GLAND: Unremarkable. *  CAROTID SPACE:  Unremarkable. 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 *  SPINE: Unremarkable. *  SUPERFICIAL LESION: Unremarkable. LYMPH NODES: *  LEVEL LYMPHS: Unremarkable. *  NONLEVEL LYMPH NODES: Unremarkable. 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 Date of Dictation: 1/17/2018 11:42 PM     Xr Chest Port    Result Date: 1/17/2018  Portable chest: History: Severe shortness of breath today, history of pulmonary fibrosis. Comparison: 12/24/2017 Findings: A single view of the chest was obtained at 1651 hours. The cardiac and mediastinal silhouette are normal in size and configuration. Lung volumes are mildly diminished with scattered coarse interstitial opacities, unchanged. There are no superimposed airspace consolidations. There are no pleural effusions. The pulmonary vascularity is within normal limits. Impression: Chronic changes without evidence of acute pulmonary process. Echocardiogram results:  No results found for this visit on 01/17/18. All Micro Results     Procedure Component Value Units Date/Time    INFLUENZA A & B AG (RAPID TEST) [[id number]] Collected:  01/18/18 0815    Order Status:  Completed Specimen:  Nasopharyngeal from Nasal washing Updated:  01/18/18 0854     Influenza A Ag NEGATIVE          NEGATIVE FOR THE PRESENCE OF INFLUENZA A ANTIGEN  INFECTION DUE TO INFLUENZA A CANNOT BE RULED OUT. BECAUSE THE ANTIGEN PRESENT IN THE SAMPLE MAY BE BELOW  THE DETECTION LIMIT OF THE TEST. A NEGATIVE TEST IS PRESUMPTIVE AND IT IS RECOMMENDED THAT THESE RESULTS BE CONFIRMED BY VIRAL CULTURE OR AN FDA-CLEARED INFLUENZA A AND B MOLECULAR ASSAY. Influenza B Ag NEGATIVE          NEGATIVE FOR THE PRESENCE OF INFLUENZA B ANTIGEN  INFECTION DUE TO INFLUENZA B CANNOT BE RULED OUT.   BECAUSE THE ANTIGEN PRESENT IN THE SAMPLE MAY BE BELOW  THE DETECTION LIMIT OF THE TEST. A NEGATIVE TEST IS PRESUMPTIVE AND IT IS RECOMMENDED THAT THESE RESULTS BE CONFIRMED BY VIRAL CULTURE OR AN FDA-CLEARED INFLUENZA A AND B MOLECULAR ASSAY.                Labs: Results:       BMP, Mg, Phos Recent Labs      01/19/18   0609  01/18/18   0405  01/17/18   1705   NA  136  135*  138   K  4.4  4.2  5.1   CL  97*  97*  99   CO2  30  26  29   AGAP  9  12  10   BUN  32*  29*  31*   CREA  1.57*  1.86*  1.68*   CA  8.4  8.4  8.4   GLU  170*  211*  174*      CBC Recent Labs      01/18/18   0405  01/17/18   1705   WBC  10.2  9.8   RBC  4.15  4.18   HGB  11.2*  11.3*   HCT  36.2  36.5   PLT  325  365   GRANS   --   76   LYMPH   --   15   EOS   --   2   MONOS   --   7   BASOS   --   0   IG   --   1   ANEU   --   7.4   ABL   --   1.5   EMMETT   --   0.2   ABM   --   0.7   ABB   --   0.0   AIG   --   0.1      LFT Recent Labs      01/17/18   1705   SGOT  30   ALT  17   AP  74   TP  7.5   ALB  2.9*   GLOB  4.6*   AGRAT  0.6*      Cardiac Testing Lab Results   Component Value Date/Time     01/17/2018 05:05 PM     12/23/2017 01:48 PM     09/05/2017 11:07 AM     08/05/2016 07:23 PM    B-type Natriuretic Peptide 327.6 09/15/2017 09:35 AM    Troponin-I, Qt. 0.07 01/17/2018 05:05 PM    Troponin-I, Qt. 0.09 12/24/2017 02:14 AM    Troponin-I, Qt. 0.10 12/23/2017 10:14 PM      Coagulation Tests Lab Results   Component Value Date/Time    Prothrombin time 13.6 12/23/2017 01:48 PM    INR 1.0 12/23/2017 01:48 PM      A1c No results found for: HBA1C, HGBE8, JEP9XBBZ, XGK1TRYC   Lipid Panel No results found for: CHOL, CHOLPOCT, CHOLX, CHLST, CHOLV, 825859, HDL, LDL, LDLC, DLDLP, 339891, VLDLC, VLDL, TGLX, TRIGL, TRIGP, TGLPOCT, CHHD, CHHDX   Thyroid Panel Lab Results   Component Value Date/Time    TSH 3.630 12/05/2017 11:50 AM        Most Recent UA Lab Results   Component Value Date/Time    Color Yellow 05/08/2017 12:26 PM    Appearance Clear 05/08/2017 12:26 PM    pH (UA) 6.0 05/08/2017 12:26 PM    Protein 30 11/13/2014 12:30 PM    Glucose NEGATIVE  11/13/2014 12:30 PM    Ketone Negative 05/08/2017 12:26 PM    Bilirubin Negative 05/08/2017 12:26 PM    Blood Negative 05/08/2017 12:26 PM    Urobilinogen 0.2 11/13/2014 12:30 PM    Nitrites Negative 05/08/2017 12:26 PM    Leukocyte Esterase Negative 05/08/2017 12:26 PM        Allergies   Allergen Reactions    Methotrexate Other (comments)     Damaged kidney and liver    Sulfa (Sulfonamide Antibiotics) Hives     Immunization History   Administered Date(s) Administered    Influenza High Dose Vaccine PF 11/17/2016, 10/09/2017    Influenza Vaccine 11/19/2007    Pneumococcal Conjugate (PCV-13) 11/17/2016    Pneumococcal Polysaccharide (PPSV-23) 03/08/2000, 12/01/2006    TB Skin Test (PPD) Intradermal 12/24/2017    Td 06/18/2004    Zoster Vaccine, Live 06/02/2009       All Labs from Last 24 Hrs:  No results found for this or any previous visit (from the past 24 hour(s)). Discharge Exam:  Patient Vitals for the past 24 hrs:   Temp Pulse Resp BP SpO2   01/20/18 1145 97.7 °F (36.5 °C) 97 17 158/78 94 %   01/20/18 0744 98 °F (36.7 °C) 85 22 164/83 95 %   01/20/18 0451 97.3 °F (36.3 °C) 98 18 166/80 93 %   01/19/18 2321 - - - - 95 %   01/19/18 2215 98.6 °F (37 °C) 73 18 157/71 94 %   01/19/18 1930 95.4 °F (35.2 °C) 98 22 156/90 93 %   01/19/18 1628 - - - - 97 %   01/19/18 1518 97.9 °F (36.6 °C) 71 20 151/69 93 %     Oxygen Therapy  O2 Sat (%): 94 % (01/20/18 1145)  Pulse via Oximetry: 75 beats per minute (01/19/18 2321)  O2 Device: Nasal cannula (01/19/18 2321)  O2 Flow Rate (L/min): 2 l/min (01/19/18 2321)    Intake/Output Summary (Last 24 hours) at 01/20/18 1251  Last data filed at 01/19/18 1930   Gross per 24 hour   Intake                0 ml   Output              250 ml   Net             -250 ml       General:    Well nourished. Alert. No distress. Eyes:   Normal sclera. Extraocular movements intact.   ENT:  Normocephalic, atraumatic. Moist mucous membranes  CV:   Regular rate and rhythm. No murmur, rub, or gallop. Lungs:  Clear to auscultation bilaterally. No wheezing, rhonchi, or rales. Abdomen: Soft, nontender, nondistended. Bowel sounds normal.   Extremities: Warm and dry. No cyanosis or edema. Neurologic: CN II-XII grossly intact. Sensation intact. Skin:     No rashes or jaundice. Psych:  Normal mood and affect. Discharge Info:   Current Discharge Medication List      START taking these medications    Details   lisinopril (PRINIVIL, ZESTRIL) 20 mg tablet Take 1 Tab by mouth daily. Qty: 30 Tab, Refills: 2      predniSONE (DELTASONE) 20 mg tablet Take 60mg daily x2d, then 40mg qday x2d, then 20mg qday x1d, then stop. Qty: 11 Tab, Refills: 0      amLODIPine (NORVASC) 5 mg tablet Take 1 Tab by mouth daily. Indications: hypertension  Qty: 30 Tab, Refills: 2         CONTINUE these medications which have CHANGED    Details   albuterol-ipratropium (DUO-NEB) 2.5 mg-0.5 mg/3 ml nebu 3 mL by Nebulization route every six (6) hours as needed. Qty: 30 Nebule, Refills: 2         CONTINUE these medications which have NOT CHANGED    Details   aspirin delayed-release 81 mg tablet Take 1 Tab by mouth daily. Qty: 30 Tab, Refills: 0      dilTIAZem CD (CARDIZEM CD) 120 mg ER capsule Take 1 Cap by mouth daily. Qty: 30 Cap, Refills: 0      furosemide (LASIX) 40 mg tablet Take 1 Tab by mouth daily. Qty: 30 Tab, Refills: 0      magnesium oxide (MAG-OX) 400 mg tablet Take 1 Tab by mouth two (2) times a day. Qty: 60 Tab, Refills: 0      metoprolol tartrate (LOPRESSOR) 50 mg tablet Take 1 Tab by mouth every twelve (12) hours. Qty: 60 Tab, Refills: 0      levothyroxine (SYNTHROID) 75 mcg tablet TAKE 1 TABLET BY MOUTH  DAILY BEFORE BREAKFAST  Qty: 90 Tab, Refills: 0      glucosamine sulfate (GLUCOSAMINE) 500 mg tablet Take 1 Tab by mouth two (2) times a day.   Qty: 60 Tab, Refills: 0    Associated Diagnoses: Rheumatoid arthritis involving multiple sites, unspecified rheumatoid factor presence (HCC)      budesonide-formoterol (SYMBICORT) 160-4.5 mcg/actuation HFA inhaler Take 2 Puffs by inhalation two (2) times a day. Qty: 3 Inhaler, Refills: 3      mirabegron ER (MYRBETRIQ) 25 mg ER tablet Take 1 Tab by mouth daily. Qty: 90 Tab, Refills: 3    Associated Diagnoses: Urge incontinence         STOP taking these medications       azithromycin (ZITHROMAX) 500 mg tab Comments:   Reason for Stopping:         acetaminophen (TYLENOL) 325 mg tablet Comments:   Reason for Stopping:         Nebulizer & Compressor machine Comments:   Reason for Stopping:         Nebulizer Accessories kit Comments:   Reason for Stopping:                 Disposition: home with home health  Activity: PT/OT per Home Health  Diet: DIET MECHANICAL SOFT 1 NECTAR    Follow-up Appointments   Procedures    FOLLOW UP VISIT Appointment in: Other (Specify) ASAP with Pulmonology for Pulmonary Fibrosis. Next week with PCP for routine follow up     ASAP with Pulmonology for Pulmonary Fibrosis. Next week with PCP for routine follow up     Standing Status:   Standing     Number of Occurrences:   1     Order Specific Question:   Appointment in     Answer: Other (Specify)         Follow-up Information     Follow up With Details Comments MD Karlos In 1 week routine [facility] [address] [facility] [address]  [phone]      [facility] Go to next available appt for pulmonary fibrosis,OFFICE WILL CALL WITH APPT. [address] [place] [address]  [phone]          Time spent in patient discharge planning and coordination 35 minutes.     Signed:  Samantha Card MD

## 2018-01-19 NOTE — PROGRESS NOTES
Problem: Mobility Impaired (Adult and Pediatric)  Goal: *Acute Goals and Plan of Care (Insert Text)  STG:  (1.)Ms. Taylor Eisenberg will move from supine to sit and sit to supine  with MINIMAL ASSIST within 3 day(s). (2.)Ms. Taylor Eisenberg will transfer from bed to chair and chair to bed with CONTACT GUARD ASSIST using the least restrictive device within 3 day(s). (3.)Ms. Taylor Eisenberg will ambulate with CONTACT GUARD ASSIST for 25 feet with the least restrictive device with stable O2 sat >90% within 3 day(s). LTG:  (1.)Ms. Taylor Eisenberg will move from supine to sit and sit to supine  in bed with CONTACT GUARD ASSIST within 6 day(s). (2.)Ms. Taylor Eisenberg will tolerate sitting up in chair for 3 hrs to improve endurance within 6 day(s). (3.)Ms. Taylor Eisenberg will increase B LE strength 1/2 grade within 6 day(s). (4.)Pt. Will ambulate 100' with RW and CGA with stable O2 sat >90% within 6 days  ________________________________________________________________________________________________    PHYSICAL THERAPY: Daily Note, Treatment Day: 1st, AM 1/19/2018  OBSERVATION: Hospital Day: 3  Payor: [address] [place] [address] / Plan: [address] / Product Type: Managed Care Medicare /      NAME/AGE/GENDER: Keisha Palencia is a 80 y.o. female   PRIMARY DIAGNOSIS: Hypoxia Acute respiratory failure with hypoxia ([address] Acute respiratory failure with hypoxia ([address]        ICD-10: Treatment Diagnosis:   · Generalized Muscle Weakness (M62.81)  · Difficulty in walking, Not elsewhere classified (R26.2)  · Other abnormalities of gait and mobility (R26.89)   Precaution/Allergies:  Methotrexate and Sulfa (sulfonamide antibiotics)      ASSESSMENT:     Ms. Taylor Eisenberg presents with decreased general strength and endurance, decreased standing balance and functional mobility. She has audible wheezing as I enter room. After we ambulated 5' , her O2 sats dropped to 88% on O2. She would benefit from further PT while here. She was hospitalized in dec. And since then has had 24 hr sitters. She really wouldn't ambulate much and used a BSC for toileting. Prior to that illness, she had someone come in am and then in pm from 6-10. I feel that she could potentially benefit from rehab if that was option with her insurance, otherwise  PT and 24 hr sitters would be my recommendation. She has the necessary DME at home. It appears that she is being DCd today. I definitely recommend [facility] for her. She did well with exs., but is short of breath. I had her sit on EOB and do some exs as well. Did a little better with ambulation 8' to the recliner with min assist.    This section established at most recent assessment   PROBLEM LIST (Impairments causing functional limitations):  1. Decreased Strength  2. Decreased Transfer Abilities  3. Decreased Ambulation Ability/Technique  4. Decreased Balance  5. Decreased Activity Tolerance  6. Decreased Pacing Skills  7. Decreased Work Simplification/Energy Conservation Techniques  8. Increased Fatigue  9. Increased Shortness of Breath  10. Decreased Flexibility/Joint Mobility  11. Decreased Amorita with Home Exercise Program   INTERVENTIONS PLANNED: (Benefits and precautions of physical therapy have been discussed with the patient.)  1. Balance Exercise  2. Bed Mobility  3. Gait Training  4. Home Exercise Program (HEP)  5. Range of Motion (ROM)  6. Therapeutic Activites  7. Therapeutic Exercise/Strengthening  8. Transfer Training  9. endurance activities     TREATMENT PLAN: Frequency/Duration: daily for duration of hospital stay  Rehabilitation Potential For Stated Goals: [facility] REHABILITATION/EQUIPMENT: (at time of discharge pending progress): Due to the probability of continued deficits (see above) this patient will likely need continued skilled physical therapy after discharge.   Equipment:    has a TTB, BSC and RW in the home              HISTORY:   History of Present Injury/Illness (Reason for Referral): Admitted with SOB and wheezing that has been increasing since Mon. Was hospitalized in Dec. For similar episode  Past Medical History/Comorbidities:   Ms. Shailesh Staples  has a past medical history of Arthritis; Cancer ([address] Exertional dyspnea; HBP (high blood pressure); Hypothyroid; PE (pulmonary embolism) (); and TB (pulmonary tuberculosis) (1930). She also has no past medical history of Adverse effect of anesthesia; Difficult intubation; Hematuria, microscopic; Kidney stone; Malignant hyperthermia due to anesthesia; Nausea & vomiting; or Pseudocholinesterase deficiency. Ms. Shailesh Staples  has a past surgical history that includes hx appendectomy (1940); hx tonsil and adenoidectomy (193); hx hip replacement (Left, 2007); hx  section; hx carpal tunnel release (Bilateral); and hx back surgery (2016). Social History/Living Environment:   Home Environment: Private residence  # Steps to Enter: 1  Rails to Enter: No  One/Two Story Residence: One story  Living Alone: Yes  Support Systems: Home care staff (24 hour sitters)  Patient Expects to be Discharged to[de-identified] Private residence  Current DME Used/Available at Home: Tub transfer bench, Walker, rolling, Commode, bedside  Tub or Shower Type: Tub/Shower combination  Prior Level of Function/Work/Activity:  She required assist with ADLs prior to admission and walked through home with RW. Since Dec., she has been dependent for ADLs, uses BSC and doesn't ambulate much at all  Dominant Side:         RIGHT   Number of Personal Factors/Comorbidities that affect the Plan of Care: 1-2: MODERATE COMPLEXITY   EXAMINATION:   Most Recent Physical Functioning:   Gross Assessment:  AROM: Generally decreased, functional (B LEs)  Strength: Generally decreased, functional (L hip 3-/5; R LE and L knee/ankle 3+-4/5)  Sensation: Intact (B LEs)               Posture:     Balance:  Sitting: Intact  Standing: Pull to stand; With support Bed Mobility:  Supine to Sit: Moderate assistance  Scooting: Moderate assistance  Wheelchair Mobility:     Transfers:  Sit to Stand: Minimum assistance  Stand to Sit: Minimum assistance  Bed to Chair: Minimum assistance (used RW)  Duration: 15 Minutes  Gait:     Speed/Bessy: Slow  Gait Abnormalities: Antalgic;Decreased step clearance  Distance (ft): 8 Feet (ft)  Assistive Device: Walker, rolling  Ambulation - Level of Assistance: Minimal assistance  Interventions: Safety awareness training;Verbal cues      Body Structures Involved:  1. Lungs  2. Joints  3. Muscles Body Functions Affected:  1. Respiratory  2. Neuromusculoskeletal  3. Movement Related Activities and Participation Affected:  1. Mobility  2. Self Care   Number of elements that affect the Plan of Care: 4+: HIGH COMPLEXITY   CLINICAL PRESENTATION:   Presentation: Evolving clinical presentation with changing clinical characteristics: MODERATE COMPLEXITY   CLINICAL DECISION MAKIN [facility] Inpatient Short Form  How much difficulty does the patient currently have. .. Unable A Lot A Little None   1. Turning over in bed (including adjusting bedclothes, sheets and blankets)? [] 1   [] 2   [x] 3   [] 4   2. Sitting down on and standing up from a chair with arms ( e.g., wheelchair, bedside commode, etc.)   [] 1   [] 2   [x] 3   [] 4   3. Moving from lying on back to sitting on the side of the bed? [] 1   [x] 2   [] 3   [] 4   How much help from another person does the patient currently need. .. Total A Lot A Little None   4. Moving to and from a bed to a chair (including a wheelchair)? [] 1   [] 2   [x] 3   [] 4   5. Need to walk in hospital room? [] 1   [] 2   [x] 3   [] 4   6. Climbing 3-5 steps with a railing? [] 1   [x] 2   [] 3   [] 4   © , [facility] of [address] [place] [address] [address] [address], under license to [employer].  All rights reserved      Score:  Initial: 16 Most Recent: 16 (Date: 18 )    Interpretation of Tool:  Represents activities that are increasingly more difficult (i.e. Bed mobility, Transfers, Gait). Score 24 23 22-20 19-15 14-10 9-7 6     Modifier CH CI CJ CK CL CM CN      ? Mobility - Walking and Moving Around:     - CURRENT STATUS: CK - 40%-59% impaired, limited or restricted    - GOAL STATUS: CJ - 20%-39% impaired, limited or restricted    - D/C STATUS:  CK - 40%-59% impaired, limited or restricted  Payor: [employer] / Plan: [employer] / Product Type: [employer] Medicare /      Medical Necessity:     · Patient demonstrates fair rehab potential due to higher previous functional level. Reason for Services/Other Comments:  · Patient continues to require present interventions due to patient's inability to perform functional mobility with CGA. Use of outcome tool(s) and clinical judgement create a POC that gives a: Questionable prediction of patient's progress: MODERATE COMPLEXITY            TREATMENT:   (In addition to Assessment/Re-Assessment sessions the following treatments were rendered)   Pre-treatment Symptoms/Complaints:  SOB  Pain: Initial:   Pain Intensity 1: 0  Post Session:  0     Therapeutic Activity: (  15 Minutes ):  Therapeutic activities including Bed transfers, Chair transfers, Ambulation on level ground and bed mobility to improve mobility, strength, balance and endurance. Required minimal Safety awareness training;Verbal cues to maintain stable O2 sats and move safely. Therapeutic Exercise: (15 Minutes):  Exercises per grid below to improve mobility, strength, coordination and endurance. Required minimal visual, verbal and tactile cues to promote proper body alignment and promote proper body breathing techniques. Progressed range, repetitions and complexity of movement as indicated. Date:  1/19/18 Date:   Date:     Activity/Exercise Parameters Parameters Parameters   Supine:  Ankle pumps 10           Hip AB/AD 10          Heel Slides 10AA          SLR 10AA Sitting: LAQ 10          Shldr. Flex/ext 10           Shldr. Ab/AD 10          Elbow Flex/ext 10           Wrist Flex/ext 10                                  Braces/Orthotics/Lines/Etc:   · O2 Device: Nasal cannula  Treatment/Session Assessment:    · Response to Treatment:  Some SOB noted  · Interdisciplinary Collaboration:   o Physical Therapist  o Registered Nurse  o Certified Nursing Assistant/Patient Care Technician  · After treatment position/precautions:   o Up in chair  o Bed alarm/tab alert on  o Bed/Chair-wheels locked  o Bed in low position  o Call light within reach  o RN notified  o CNA aware   · Compliance with Program/Exercises: Will assess as treatment progresses. · Recommendations/Intent for next treatment session: \"Next visit will focus on advancements to more challenging activities and reduction in assistance provided\".  Possible DC home today  Total Treatment Duration:  PT Patient Time In/Time Out  Time In: 0930  Time Out: 1000    Demar Navarrete PT

## 2018-01-19 NOTE — CONSULTS
CONSULT NOTE    Emil Oreilly    1/19/2018    Date of Admission:  1/17/2018    The patient's chart is reviewed and the patient is discussed with the staff. Subjective:     Patient is a 80 y.o.  female seen and evaluated at the request of Dr. Michelle Bradford  for the evaluation of pulmonary fibrosis and hypoxemia. Histroy obtained from her caregiver. The patient was diagnosed with pulmonary fibrosis  Sometimes last year. She has never seen pulmonologist. She is not on home 02. She is on nebs and Symbicort. The patient has wheezing and SOB at baseline. Not very active, has multiple care givers. She had progressive SOB ,cough and wheezing and she is now hypoxic. Review of Systems  A comprehensive review of systems was negative except for that written in the HPI. Patient Active Problem List   Diagnosis Code    Rheumatoid arthritis ([facility] M06.9    IPMN (intraductal papillary mucinous neoplasm) D49.0    Urge incontinence N39.41    Nocturnal enuresis N39.44    Mild persistent asthma without complication J13.75    Paroxysmal atrial fibrillation (HCC) I48.0    Bronchitis J40    Chronic diastolic congestive heart failure (HCC) I50.32    Stage 3 chronic kidney disease N18.3    Pulmonary fibrosis (HCC) J84.10    Acute on chronic respiratory failure with hypoxia ([facility]) J96.21    URI (upper respiratory infection) J06.9    Oropharyngeal dysphagia R13.12           Prior to Admission Medications   Prescriptions Last Dose Informant Patient Reported? Taking?   aspirin delayed-release 81 mg tablet   No Yes   Sig: Take 1 Tab by mouth daily. azithromycin (ZITHROMAX) 500 mg tab   No Yes   Sig: Take 1 Tab by mouth daily. budesonide-formoterol (SYMBICORT) 160-4.5 mcg/actuation HFA inhaler   No Yes   Sig: Take 2 Puffs by inhalation two (2) times a day. dilTIAZem CD (CARDIZEM CD) 120 mg ER capsule   No Yes   Sig: Take 1 Cap by mouth daily.    furosemide (LASIX) 40 mg tablet   No Yes   Sig: Take 1 Tab by mouth daily. glucosamine sulfate (GLUCOSAMINE) 500 mg tablet   No Yes   Sig: Take 1 Tab by mouth two (2) times a day. levothyroxine (SYNTHROID) 75 mcg tablet   No Yes   Sig: TAKE 1 TABLET BY MOUTH  DAILY BEFORE BREAKFAST   magnesium oxide (MAG-OX) 400 mg tablet   No Yes   Sig: Take 1 Tab by mouth two (2) times a day. metoprolol tartrate (LOPRESSOR) 50 mg tablet   No Yes   Sig: Take 1 Tab by mouth every twelve (12) hours. mirabegron ER (MYRBETRIQ) 25 mg ER tablet   No Yes   Sig: Take 1 Tab by mouth daily. Facility-Administered Medications: None       Past Medical History:   Diagnosis Date    Arthritis     Cancer ([address]     pre-cancerous cyst on pancreas    Exertional dyspnea     HBP (high blood pressure)     Hypothyroid     PE (pulmonary embolism)     In [facility] - blood thinner    TB (pulmonary tuberculosis) 1930     Past Surgical History:   Procedure Laterality Date    HX APPENDECTOMY  1940    HX BACK SURGERY  2016    cyst removal; burst     HX CARPAL TUNNEL RELEASE Bilateral     HX  SECTION      HX HIP REPLACEMENT Left 2007    HX TONSIL AND ADENOIDECTOMY  193     Social History     Social History    Marital status: SINGLE     Spouse name: N/A    Number of children: N/A    Years of education: N/A     Occupational History    Not on file.      Social History Main Topics    Smoking status: Never Smoker    Smokeless tobacco: Never Used    Alcohol use No    Drug use: No    Sexual activity: Not on file     Other Topics Concern    Not on file     Social History Narrative     Family History   Problem Relation Age of Onset    Tuberculosis Father     Cancer Mother     No Known Problems Sister     No Known Problems Brother      Allergies   Allergen Reactions    Methotrexate Other (comments)     Damaged kidney and liver    Sulfa (Sulfonamide Antibiotics) Hives       Current Facility-Administered Medications   Medication Dose Route Frequency    [START ON 1/20/2018] predniSONE (DELTASONE) tablet 60 mg  60 mg Oral DAILY WITH BREAKFAST    methylPREDNISolone (PF) (SOLU-MEDROL) injection 40 mg  40 mg IntraVENous NOW    lisinopril (PRINIVIL, ZESTRIL) tablet 10 mg  10 mg Oral DAILY    hydrALAZINE (APRESOLINE) 20 mg/mL injection 20 mg  20 mg IntraVENous Q6H PRN    PPD read reminder  1 Each Other Q24H    aspirin delayed-release tablet 81 mg  81 mg Oral DAILY    azithromycin (ZITHROMAX) tablet 500 mg  500 mg Oral DAILY    budesonide (PULMICORT) 500 mcg/2 ml nebulizer suspension  500 mcg Nebulization BID RT    dilTIAZem CD (CARDIZEM CD) capsule 120 mg  120 mg Oral DAILY    furosemide (LASIX) tablet 40 mg  40 mg Oral DAILY    levothyroxine (SYNTHROID) tablet 75 mcg  75 mcg Oral ACB    magnesium oxide (MAG-OX) tablet 400 mg  400 mg Oral BID    metoprolol tartrate (LOPRESSOR) tablet 50 mg  50 mg Oral Q12H    mirabegron ER (MYRBETRIQ) tablet 25 mg (Patient Supplied)  25 mg Oral DAILY    sodium chloride (NS) flush 5-10 mL  5-10 mL IntraVENous Q8H    sodium chloride (NS) flush 5-10 mL  5-10 mL IntraVENous PRN    acetaminophen (TYLENOL) tablet 650 mg  650 mg Oral Q4H PRN    ondansetron (ZOFRAN) injection 4 mg  4 mg IntraVENous Q4H PRN    heparin (porcine) injection 5,000 Units  5,000 Units SubCUTAneous Q8H    albuterol-ipratropium (DUO-NEB) 2.5 MG-0.5 MG/3 ML  3 mL Nebulization Q4H RT         Objective:     Vitals:    01/19/18 0313 01/19/18 0752 01/19/18 0822 01/19/18 1157   BP: 167/89  182/81 124/74   Pulse: (!) 101  (!) 110 77   Resp: 24  22 22   Temp: 97.8 °F (36.6 °C)  96 °F (35.6 °C) 96 °F (35.6 °C)   SpO2: 96% 98% 98% 95%   Weight:       Height:           PHYSICAL EXAM     Constitutional:  the patient is well developed and in no acute distress  EENMT:  Sclera clear, pupils equal, oral mucosa moist  Respiratory: wheezing Cardiovascular:  RRR without M,G,R  Gastrointestinal: soft and non-tender; with positive bowel sounds.   Musculoskeletal: warm without cyanosis. There is no lower leg edema.   Skin:  no jaundice or rashes, no wounds   Neurologic: no gross neuro deficits     Psychiatric:  alert and oriented x 3      Chest CT         Recent Labs      01/18/18   0405  01/17/18   1705   WBC  10.2  9.8   HGB  11.2*  11.3*   HCT  36.2  36.5   PLT  325  365     Recent Labs      01/19/18   0609  01/18/18   0405  01/17/18   1705   NA  136  135*  138   K  4.4  4.2  5.1   CL  97*  97*  99   GLU  170*  211*  174*   CO2  30  26  29   BUN  32*  29*  31*   CREA  1.57*  1.86*  1.68*   CA  8.4  8.4  8.4   TROIQ   --    --   0.07*   ALB   --    --   2.9*   TBILI   --    --   0.2   ALT   --    --   17   SGOT   --    --   30       Assessment:  (Medical Decision Making)     Hospital Problems  Date Reviewed: 12/25/2017          Codes Class Noted POA    Oropharyngeal dysphagia (Chronic) ICD-10-CM: R13.12  ICD-9-CM: 787.22  1/19/2018 Yes    Overview Signed 1/19/2018  8:15 AM by Edyta Knight MD     Per SLP 1/18/18 modified diet, mechanical soft with nectar thickened liquids             * (Principal)Acute on chronic respiratory failure with hypoxia ([facility] ICD-10-CM: J96.21  ICD-9-CM: 518.84, 799.02  1/17/2018 Yes        URI (upper respiratory infection) ICD-10-CM: J06.9  ICD-9-CM: 465.9  1/17/2018 Yes        Pulmonary fibrosis (HCC) (Chronic) ICD-10-CM: J84.10  ICD-9-CM: 327  12/25/2017 Yes        Paroxysmal atrial fibrillation (HCC) (Chronic) ICD-10-CM: I48.0  ICD-9-CM: 427.31  12/23/2017 Yes        Chronic diastolic congestive heart failure (HCC) (Chronic) ICD-10-CM: I50.32  ICD-9-CM: 428.32, 428.0  12/23/2017 Yes        Stage 3 chronic kidney disease (Chronic) ICD-10-CM: N18.3  ICD-9-CM: 585.3  12/23/2017 Yes              Plan:  (Medical Decision Making)       -agree with steroids, nebs  -, her CT scan in 12/2017 was consistent with UIP and emphysema as well  -she will need home 02  - will check back on her on Monday     More than 50% of the time documented was spent in face-to-face contact with the patient and in the care of the patient on the floor/unit where the patient is located. Thank you very much for this referral.  We appreciate the opportunity to participate in this patient's care. Will follow along with above stated plan.     Abdullahi Douglass MD

## 2018-01-20 NOTE — DISCHARGE INSTRUCTIONS
DISCHARGE SUMMARY from Nurse    PATIENT INSTRUCTIONS:    After general anesthesia or intravenous sedation, for 24 hours or while taking prescription Narcotics:  · Limit your activities  · Do not drive and operate hazardous machinery  · Do not make important personal or business decisions  · Do  not drink alcoholic beverages  · If you have not urinated within 8 hours after discharge, please contact your surgeon on call. Report the following to your surgeon:  · Excessive pain, swelling, redness or odor of or around the surgical area  · Temperature over 100.5  · Nausea and vomiting lasting longer than 4 hours or if unable to take medications  · Any signs of decreased circulation or nerve impairment to extremity: change in color, persistent  numbness, tingling, coldness or increase pain  · Any questions    What to do at Home:  Recommended activity: Activity as tolerated,     If you experience any of the following symptoms increased shortness of breath, please follow up with primary care or ER. *  Please give a list of your current medications to your Primary Care Provider. *  Please update this list whenever your medications are discontinued, doses are      changed, or new medications (including over-the-counter products) are added. *  Please carry medication information at all times in case of emergency situations. These are general instructions for a healthy lifestyle:    No smoking/ No tobacco products/ Avoid exposure to second hand smoke  Surgeon General's Warning:  Quitting smoking now greatly reduces serious risk to your health.     Obesity, smoking, and sedentary lifestyle greatly increases your risk for illness    A healthy diet, regular physical exercise & weight monitoring are important for maintaining a healthy lifestyle    You may be retaining fluid if you have a history of heart failure or if you experience any of the following symptoms:  Weight gain of 3 pounds or more overnight or 5 pounds in a week, increased swelling in our hands or feet or shortness of breath while lying flat in bed. Please call your doctor as soon as you notice any of these symptoms; do not wait until your next office visit. Recognize signs and symptoms of STROKE:    F-face looks uneven    A-arms unable to move or move unevenly    S-speech slurred or non-existent    T-time-call 911 as soon as signs and symptoms begin-DO NOT go       Back to bed or wait to see if you get better-TIME IS BRAIN. Warning Signs of HEART ATTACK     Call 911 if you have these symptoms:   Chest discomfort. Most heart attacks involve discomfort in the center of the chest that lasts more than a few minutes, or that goes away and comes back. It can feel like uncomfortable pressure, squeezing, fullness, or pain.  Discomfort in other areas of the upper body. Symptoms can include pain or discomfort in one or both arms, the back, neck, jaw, or stomach.  Shortness of breath with or without chest discomfort.  Other signs may include breaking out in a cold sweat, nausea, or lightheadedness. Don't wait more than five minutes to call 911 - MINUTES MATTER! Fast action can save your life. Calling 911 is almost always the fastest way to get lifesaving treatment. Emergency Medical Services staff can begin treatment when they arrive -- up to an hour sooner than if someone gets to the hospital by car. The discharge information has been reviewed with the {PATIENT PARENT GUARDIAN:[id number]}. The {PATIENT PARENT GUARDIAN:[id number]} verbalized understanding. Discharge medications reviewed with the {Dishcarge meds reviewed RZUV:[id number]} and appropriate educational materials and side effects teaching were provided.   ___________________________________________________________________________________________________________________________________

## 2018-01-20 NOTE — PROGRESS NOTES
Lying supine, HOB elevated, nasal O2 2 liter, no acute distress, bilateral inspiratory wheezing and rhonchi, SOB on exertion, edema +1, pitting, neurovascular checks WDL, without complaints of pain.

## 2018-01-20 NOTE — PROGRESS NOTES
Discharge instructions reviewed with son and copy provided. Iv was removed without difficulty. Pt will go home on oxygen. Pt son verbalized understanding.

## 2018-01-20 NOTE — PROGRESS NOTES
Pt resting in bed and is alert and oriented x 2. She denies pain and is on 2 L NC. RR even and unlabored. Call light in reach and pt instructed to call for assistance if needed. Will monitor. Bed alarm on.

## 2018-01-22 NOTE — PROGRESS NOTES
ERAN received call from Zhen Atkinson with [facility] stating pt is at home and they do not have a d/c summary, unsure if [facility] services set up, also pt does not have home O2. ERAN confirmed with [address] that O2 was delivered to pt's home on Sat. Referral to [facility] was made on Friday by Brandy Gary. ERAN faxed d/c summary to [address] provided update of above.

## 2018-02-08 NOTE — PROGRESS NOTES
William Gerald  : 10/30/1924  Primary: Yasmani Granados Medicare Complete  Secondary:  [address]  at [facility], [facility], [address]  [phone]   [phone]        OUTPATIENT SPEECH LANGUAGE PATHOLOGY: MODIFIED BARIUM SWALLOW    ICD-10: Treatment Diagnosis: Oropharyngeal dysphagia (R13.12)  DATE: 2018  REFERRING PHYSICIAN: Anai Rothman MD MD Orders: Modified Barium Swallow   PAST MEDICAL HISTORY:   Ms. Remington Recinos is a 80 y.o. female who  has a past medical history of Arthritis; Cancer ([address] Exertional dyspnea; HBP (high blood pressure); Hypothyroid; PE (pulmonary embolism) (); and TB (pulmonary tuberculosis) (1930). She also has no past medical history of Adverse effect of anesthesia; Difficult intubation; Hematuria, microscopic; Kidney stone; Malignant hyperthermia due to anesthesia; Nausea & vomiting; or Pseudocholinesterase deficiency. She also  has a past surgical history that includes hx appendectomy (1940); hx tonsil and adenoidectomy (193); hx hip replacement (Left, 2007); hx  section; hx carpal tunnel release (Bilateral); and hx back surgery (2016). RADIOLOGIST:  Dr. Nickolas George  MEDICAL/REFERRING DIAGNOSIS: Dysphagia, unspecified type [R13.10]    PRECAUTIONS/ALLERGIES: Methotrexate and Sulfa (sulfonamide antibiotics)   ASSESSMENT/PLAN OF CARE:  Based on the objective data described below, the patient presents with trace transient laryngeal penetration without aspiration of serial swallows of thin liquids that improves with single cup sips. NO laryngeal penetration or aspiration observed with pudding, mixed consistency or cracker. Recommend regular textures and thin liquids with single cup sips. Patient should clear throat and dry swallow at conclusion of PO intake. Patient will benefit from skilled intervention to address the above impairments for laryngeal strengthening exercises.   RECOMMENDATIONS AND PLANNED INTERVENTIONS (Benefits and precautions of therapy have been discussed with the patient.):  · PO:  Regular  · Liquids:  regular thin  MEDICATIONS:  · whole in puree  COMPENSATORY STRATEGIES/MODIFICATIONS INCLUDING:  · Small sips and bites  OTHER RECOMMENDATIONS (including follow up treatment recommendations):   · Laryngeal exercises    Thank you for this referral,  Alisa Monroe MA, CCC-SLP  SUBJECTIVE:  Patient pleasant and cooperative. She is accompanied by 2 caregivers. Present Symptoms: choking with liquids      Current Dietary Status:  Regular textures, nectar liquids      Work/Activity History: retired    OBJECTIVE:  Objective Measure: Tool Used: National Outcomes Measurement System: Functional Communication Measures: SWALLOWING  Score:  Initial: 5 Most Recent: X (Date: -- )   Interpretation of Tool: This measure describes the change in functional communication status subsequent to speech-language pathology treatment of patients with dysphagia.  o Level 1:  Individual is not able to swallow anything safely by mouth. All nutrition and hydration is received through non-oral means (e.g., nasogastric tube, PEG). o Level 2: Individual is not able to swallow safely by mouth for nutrition and hydration, but may take some consistency with consistent maximal cues in therapy only. Alternative method of feeding required. o Level 3:  Alternative method of feeding required as individual takes less than 50% of nutrition and hydration by mouth, and/or swallowing is safe with consistent use of moderate cues to use compensatory strategies and/or requires maximum diet restriction. o Level 4:  Swallowing is safe, but usually requires moderate cues to use compensatory strategies, and/or the individual has moderate diet restrictions and/or still requires tube feeding and/or oral supplements.   o Level 5:  Swallowing is safe with minimal diet restriction and/or occasionally requires minimal cueing to use compensatory strategies. The individual may occasionally self-cue. All nutrition and hydration needs are met by mouth at mealtime. o Level 6:  Swallowing is safe, and the individual eats and drinks independently and may rarely require minimal cueing. The individual usually self-cues when difficulty occurs. May need to avoid specific food items (e.g., popcorn and nuts), or require additional time (due to dysphagia). o Level 7: The individuals ability to eat independently is not limited by swallow function. Swallowing would be safe and efficient for all consistencies. Compensatory strategies are effectively used when needed. Score Level 7 Level 6 Level 5 Level 4 Level 3 Level 2 Level 1   Modifier CH CI CJ CK CL CM CN   ? Swallowing:     - CURRENT STATUS: CJ - 20%-39% impaired, limited or restricted    - GOAL STATUS:  CI - 1%-19% impaired, limited or restricted    - D/C STATUS:  CJ - 20%-39% impaired, limited or restricted      Cognitive/Communication Status:  Mental Status  Neurologic State: Alert  Orientation Level: Appropriate for age  Cognition: Appropriate decision making  Perception: Appears intact  Perseveration: No perseveration noted  Safety/Judgement: Awareness of environment    Oral Assessment:  Oral Assessment  Labial: No impairment  Dentition: Natural  Lingual: No impairment  Velum: No impairment    Vocal Quality: adequate    Patient Viewed:    Film Views: Lateral, Fluoro    Oral Prepatory:  The patient was given the following: Consistency Presented:  Thin liquid, Solid, Pudding, Mixed consistency  How Presented: Self-fed/presented, SLP-fed/presented, Cup/sip, Cup/gulp, Spoon, Straw, Successive swallows    Oral Phase:  Bolus Acceptance: No impairment  Bolus Formation/Control: No impairment  Propulsion: No impairment     Oral Residue: None  Initiation of Swallow: No impairment  Oral Phase Severity: Minimal    Pharyngeal Phase:  Timing: No impairment  Decreased Tongue Base Retraction?: No  Laryngeal Elevation: WFL (within functional limits)  Penetration: Flash/transient, Trace  Aspiration/Timing: No evidence of aspiration  Aspiration/Penetration Score: 2 (Penetration/No residue-Contrast enters the airway penetrates, remains above the folds/cords, and is cleared)  Pharyngeal Symmetry: Not assessed  Pharyngeal Dysfunction: None  Pharyngeal Phase Severity: Minimal  Pharyngeal-Esophageal Segment: No impairment    Assessment/Reassessment only, no treatment provided today   __________________________________________________________  Recommendations for treatment: laryngeal strengthening exercises  Total Treatment Duration:  Time In: 0900   Time Out: [address], [place], CCC-SLP

## 2018-04-10 PROBLEM — R53.81 DEBILITY: Status: ACTIVE | Noted: 2018-01-01

## 2018-04-10 PROBLEM — Z51.5 PALLIATIVE CARE PATIENT: Status: ACTIVE | Noted: 2018-01-01

## 2018-04-10 PROBLEM — M48.05 SPINAL STENOSIS OF THORACOLUMBAR REGION: Status: ACTIVE | Noted: 2018-01-01

## 2018-07-17 PROBLEM — J40 BRONCHITIS: Status: RESOLVED | Noted: 2017-01-01 | Resolved: 2018-01-01

## 2018-07-17 PROBLEM — J06.9 URI (UPPER RESPIRATORY INFECTION): Status: RESOLVED | Noted: 2018-01-01 | Resolved: 2018-01-01

## 2018-08-13 PROBLEM — R55 SYNCOPE: Status: ACTIVE | Noted: 2018-01-01

## 2018-08-13 NOTE — IP AVS SNAPSHOT
Mariaelena Salter 
 
 
 [address] [facility] [address] [facility] 
[phone] Patient: Wes Mcpherson MRN: [id number] :10/30/1924 About your hospitalization You were admitted on:  2018 You last received care in the:  [facility] 7 MED SURG You were discharged on:  2018 Why you were hospitalized Your primary diagnosis was:  Syncope Your diagnoses also included:  Paroxysmal Atrial Fibrillation (Hcc), Stage 3 Chronic Kidney Disease, Pulmonary Fibrosis (Hcc), Anemia Follow-up Information Follow up With Details Comments Contact Info Mamta Frazier MD On 2018 3:45 pm [address] [facility] [address]  [facility] [address] 
[phone] Jasmin Guzman MD On 2018 1:30 pm 2 [facility] 400 [facility] [address] 
[phone] Your Scheduled Appointments   3:45 PM EDT Office Visit with Mamta Frazier MD  
[address] [facility] (9 [facility]) [address] [facility] [address]  
[phone]   1:30 PM EDT HOSPITAL FOLLOW-UP with Jasmin Guzman MD  
One [facility] ([facility]) 2 [facility]  
[phone] Discharge Orders None A check surya indicates which time of day the medication should be taken. My Medications START taking these medications Instructions Each Dose to Equal  
 Morning Noon Evening Bedtime  
 predniSONE 20 mg tablet Commonly known as:  Orlean Aas Start taking on:  2018 Your next dose is:  Tomorrow Morning Take 2 Tabs by mouth daily (with breakfast) for 3 days. 40 mg CHANGE how you take these medications Instructions Each Dose to Equal  
 Morning Noon Evening Bedtime albuterol-ipratropium 2.5 mg-0.5 mg/3 ml Nebu Commonly known as:  Carlos Ewing What changed:  reasons to take this Your next dose is: Take on as needed schedule 3 mL by Nebulization route every six (6) hours as needed. 3 mL  
    
   
   
   
  
 furosemide 40 mg tablet Commonly known as:  LASIX What changed:  additional instructions Your next dose is:  Tomorrow Morning TAKE 1 TABLET BY MOUTH EVERY MORNING, AND A 1/2 TABLET IN THE AFTERNOON. Afternoon CONTINUE taking these medications Instructions Each Dose to Equal  
 Morning Noon Evening Bedtime  
 albuterol 90 mcg/actuation inhaler Commonly known as:  PROAIR HFA Your next dose is: Take on as needed schedule Take 1 Puff by inhalation every six (6) hours as needed for Wheezing. 1 Puff  
    
   
   
   
  
 aspirin delayed-release 81 mg tablet Your next dose is:  Tomorrow Morning Take 1 Tab by mouth daily. 81 mg  
    
  
   
   
   
  
 budesonide-formoterol 160-4.5 mcg/actuation Hfaa Commonly known as:  SYMBICORT Your next dose is: This evening INHALE TWO PUFFS BY MOUTH TWICE A DAY  
     
  
   
   
  
   
  
 carvedilol 3.125 mg tablet Commonly known as:  Arianna Spry Your next dose is: This evening TAKE 1 TABLET BY MOUTH TWICE DAILY WITH MEALS Comp. Stocking,Thigh,Reg,Large Misc  
   
 2 Each by Does Not Apply route daily. 2 Each  
    
   
   
   
  
 levothyroxine 75 mcg tablet Commonly known as:  SYNTHROID Your next dose is:  Tomorrow Morning TAKE 1 TABLET BY MOUTH DAILY BEFORE BREAKFAST  
     
  
   
   
   
  
 lisinopril 20 mg tablet Commonly known as:  Lyon Princewick Your next dose is:  Tomorrow Morning\ TAKE 1 TABLET BY MOUTH DAILY  
     
  
   
   
   
  
 magnesium oxide 400 mg tablet Commonly known as:  MAG-OX Your next dose is: This evening Take 1 Tab by mouth two (2) times a day. 400 mg OSTEO BI-FLEX PO Your next dose is:  Tomorrow Morning Take 2 Caps by mouth daily. 2 Cap OXYGEN-AIR DELIVERY SYSTEMS Take 2 L by inhalation continuous. via NC  
 2 L  
    
   
   
   
  
 SALINE MIST NA  
   
 1 Spray by Nasal route as needed (dryness ). 1 Spray STOP taking these medications OTHER(NON-FORMULARY) Where to Get Your Medications Information on where to get these meds will be given to you by the nurse or doctor. ! Ask your nurse or doctor about these medications  
  predniSONE 20 mg tablet Discharge Instructions Vasovagal Syncope: Care Instructions Your Care Instructions Vasovagal syncope (say \"pok-ptq-AWK-gul EDPG-nvt-fty\")is sudden dizziness or fainting that can be set off by things such as pain, stress, fear, or trauma. You may sweat or feel lightheaded, sick to your stomach, or tingly. The problem causes the heart rate to slow and the blood vessels to widen, or dilate, for a short time. When this happens, blood pools in the lower body, and less blood goes to the brain. You can usually get relief by lying down with your legs raised (elevated). This helps more blood to flow to your brain and may help relieve symptoms like feeling dizzy. Some doctors may recommend a technique that involves tensing your fists and arms. This type of fainting is often easy to predict. For example, it happens to some people when they see blood or have to get a shot. They may feel symptoms before they faint. An episode of vasovagal syncope usually responds well to self-care. Other treatment often isn't needed. But if the fainting keeps happening, your doctor may suggest further treatments. Follow-up care is a key part of your treatment and safety.  Be sure to make and go to all appointments, and call your doctor if you are having problems. It's also a good idea to know your test results and keep a list of the medicines you take. How can you care for yourself at home? · Drink plenty of fluids to prevent dehydration. If you have kidney, heart, or liver disease and have to limit fluids, talk with your doctor before you increase your fluid intake. · Try to avoid things that you think may set off vasovagal syncope. · Talk to your doctor about any medicines you take. Some medicines may increase the chance of this condition occurring. · If you feel symptoms, lie down with your legs raised. Talk to your doctor about what to do if your symptoms come back. When should you call for help? Call 911 anytime you think you may need emergency care. For example, call if: 
  · You have symptoms of a heart problem. These may include: ¨ Chest pain or pressure. ¨ Severe trouble breathing. ¨ A fast or irregular heartbeat.  
 Watch closely for changes in your health, and be sure to contact your doctor if: 
  · You have more episodes of fainting at home.  
  · You do not get better as expected. Where can you learn more? Go to http://aleksandr-sumaya.info/. Enter L754 in the search box to learn more about \"Vasovagal Syncope: Care Instructions. \" Current as of: November 20, 2017 Content Version: 11.7 © 7202-1251 Healthwise, Incorporated. Care instructions adapted under license by [employer] (which disclaims liability or warranty for this information). If you have questions about a medical condition or this instruction, always ask your healthcare professional. [place] [address] any warranty or liability for your use of this information. DISCHARGE SUMMARY from Nurse PATIENT INSTRUCTIONS: 
 
 
F-face looks uneven A-arms unable to move or move unevenly S-speech slurred or non-existent T-time-call 911 as soon as signs and symptoms begin-DO NOT go Back to bed or wait to see if you get better-TIME IS BRAIN. Warning Signs of HEART ATTACK Call 911 if you have these symptoms: 
? Chest discomfort. Most heart attacks involve discomfort in the center of the chest that lasts more than a few minutes, or that goes away and comes back. It can feel like uncomfortable pressure, squeezing, fullness, or pain. ? Discomfort in other areas of the upper body. Symptoms can include pain or discomfort in one or both arms, the back, neck, jaw, or stomach. ? Shortness of breath with or without chest discomfort. ? Other signs may include breaking out in a cold sweat, nausea, or lightheadedness. Don't wait more than five minutes to call [address]! Fast action can save your life. Calling 911 is almost always the fastest way to get lifesaving treatment. Emergency Medical Services staff can begin treatment when they arrive  up to an hour sooner than if someone gets to the hospital by car. The discharge information has been reviewed with the patient. The patient verbalized understanding. Discharge medications reviewed with the patient and appropriate educational materials and side effects teaching were provided. ___________________________________________________________________________________________________________________________________ Phil[employer] Announcement We are excited to announce that we are making your provider's discharge notes available to you in Matrix Electronic Measuringt. You will see these notes when they are completed and signed by the physician that discharged you from your recent hospital stay.   If you have any questions or concerns about any information you see in [employer]asuring[employer], please call the Health Information Department where you were seen or reach out to your Primary Care Provider for more information about your plan of care. Introducing \[facility]\"" & HEALTH SERVICES! July Smalls introduces BookMyShowt patient portal. Now you can access parts of your medical record, email your doctor's office, and request medication refills online. 1. In your internet browser, go to [email or web address] [email or web address] 2. Click on the First Time User? Click Here link in the Sign In box. You will see the New Member Sign Up page. 3. Enter your IOCS Access Code exactly as it appears below. You will not need to use this code after youve completed the sign-up process. If you do not sign up before the expiration date, you must request a new code. · IOCS Access Code: [email or web address] Expires: 8/26/2018  6:23 PM 
 
4. Enter the last four digits of your Social Security Number (xxxx) and Date of Birth (mm/dd/yyyy) as indicated and click Submit. You will be taken to the next sign-up page. 5. Create a IOCS ID. This will be your IOCS login ID and cannot be changed, so think of one that is secure and easy to remember. 6. Create a IOCS password. You can change your password at any time. 7. Enter your Password Reset Question and Answer. This can be used at a later time if you forget your password. 8. Enter your e-mail address. You will receive e-mail notification when new information is available in [address] 9. Click Sign Up. You can now view and download portions of your medical record. 10. Click the Download Summary menu link to download a portable copy of your medical information. If you have questions, please visit the Frequently Asked Questions section of the IOCS website. Remember, IOCS is NOT to be used for urgent needs. For medical emergencies, dial 911. Now available from your iPhone and Android! Introducing Bird Han As a [facility] patient, I wanted to make you aware of our electronic visit tool called Bird Han. 6Waves/Synapsify allows you to connect within minutes with a medical provider 24 hours a day, seven days a week via a mobile device or tablet or logging into a secure website from your computer. You can access [employer] from anywhere in the [place]. A virtual visit might be right for you when you have a simple condition and feel like you just dont want to get out of bed, or cant get away from work for an appointment, when your regular [facility] provider is not available (evenings, weekends or holidays), or when youre out of town and need minor care. Electronic visits cost only $49 and if the 6Waves/Synapsify provider determines a prescription is needed to treat your condition, one can be electronically transmitted to a nearby pharmacy*. Please take a moment to enroll today if you have not already done so. The enrollment process is free and takes just a few minutes. To enroll, please download the Scuttledog ester to your tablet or phone, or visit [email or web address] to enroll on your computer. And, as an [address] [place] [address] patient with a Veeker account, the results of your visits will be scanned into your electronic medical record and your primary care provider will be able to view the scanned results. We urge you to continue to see your regular [facility] provider for your ongoing medical care. And while your primary care provider may not be the one available when you seek a 15MinutesNOWsebastiánfin virtual visit, the peace of mind you get from getting a real diagnosis real time can be priceless. For more information on 15MinutesNOWsebastiánfin, view our Frequently Asked Questions (FAQs) at [email or web address]. Sincerely, 
 
Laya Patel MD 
Chief Medical Officer [employer] *:  certain medications cannot be prescribed via [employer] Unresulted Labs-Please follow up with your PCP about these lab tests Order Current Status CULTURE, BLOOD Preliminary result CULTURE, BLOOD Preliminary result Providers Seen During Your Hospitalization Provider Specialty Primary office phone Celia Malik MD Emergency Medicine [phone] Laurita Cameron MD Internal Medicine [phone] Your Primary Care Physician (PCP) Primary Care Physician Office Phone Office Fax Teresa Prajapati [phone] You are allergic to the following Allergen Reactions Methotrexate Other (comments) Damaged kidney and liver Sulfa (Sulfonamide Antibiotics) Hives Recent Documentation Height Weight Breastfeeding? BMI OB Status Smoking Status 1.626 m 83 kg No 31.41 kg/m2 Postmenopausal Never Smoker Emergency Contacts Name Discharge Info Relation Home Work Mobile [facility] DISCHARGE CAREGIVER [3] Son [22] [phone] [id number] RennyAicha DISCHARGE CAREGIVER [3] Friend [5] [phone] Patient Belongings The following personal items are in your possession at time of discharge: 
  Dental Appliances: None  Visual Aid: Glasses      Home Medications: None   Jewelry: None  Clothing: Pajamas, Undergarments, With patient    Other Valuables: None Please provide this summary of care documentation to your next provider. Signatures-by signing, you are acknowledging that this After Visit Summary has been reviewed with you and you have received a copy. Patient Signature:  ____________________________________________________________ Date:  ____________________________________________________________  
  
Yana Martinez Provider Signature:  ____________________________________________________________ Date:  ____________________________________________________________

## 2018-08-13 NOTE — PROGRESS NOTES
Visited, as requested by staff, to offer spiritual/emotional support. Pt was sitting-up in the bed and finishing her lunch. Her private duty caregiver was present and readily engaged  in conversation along with pt. Patient appeared to be in good spirits and she expressed no specific concerns/problems. She stated that she has two sons, one who lives nearby in [place] and one who lies in [place]. (Both of her sons attended [employer], which led her to move to the [facility]. assured pt of his prayers. Provided a visitation card with chaplains contact information and offered to follow-up, if needed.     Arik Deleon MDiv, [address] [place] [address]

## 2018-08-13 NOTE — PROGRESS NOTES
Problem: Pressure Injury - Risk of  Goal: *Prevention of pressure injury  Document Alonso Scale and appropriate interventions in the flowsheet.    Outcome: Progressing Towards Goal  Pressure Injury Interventions:  Sensory Interventions: Assess changes in LOC, Keep linens dry and wrinkle-free, Maintain/enhance activity level, Minimize linen layers, Pad between skin to skin, Pressure redistribution bed/mattress (bed type)    Moisture Interventions: Absorbent underpads, Apply protective barrier, creams and emollients, Limit adult briefs, Maintain skin hydration (lotion/cream), Minimize layers, Moisture barrier    Activity Interventions: Increase time out of bed, Pressure redistribution bed/mattress(bed type), PT/OT evaluation    Mobility Interventions: HOB 30 degrees or less, Pressure redistribution bed/mattress (bed type), PT/OT evaluation    Nutrition Interventions: Document food/fluid/supplement intake    Friction and Shear Interventions: Apply protective barrier, creams and emollients, Foam dressings/transparent film/skin sealants, HOB 30 degrees or less, Minimize layers, Lift team/patient mobility team

## 2018-08-13 NOTE — PROGRESS NOTES
AM: Physical therapy evaluation attempted. Lab at bedside. PM: Re-attempted evaluation; ECHO at bedside. Will continue to follow and re-attempt.  Thank you,    Ileana Guerrero, PT, DPT  8/13/18 11:45AM and 13:54PM

## 2018-08-13 NOTE — PROGRESS NOTES
In accordance with Medicare guidelines, the Medicare Outpatient Observation Notice was provided to the patient's son. Oral explanation was provided and all questions answered. Document signed by pt's son was placed in the medical record under media tab. Copy provided to patient's son. Care manager notified.

## 2018-08-13 NOTE — PROGRESS NOTES
TRANSFER - IN REPORT:    Verbal report received from Hollis Wang RN(name) on Elisabeth Pruitt  being received from ED(unit) for routine progression of care      Report consisted of patients Situation, Background, Assessment and   Recommendations(SBAR). Information from the following report(s) SBAR, Kardex, ED Summary, Procedure Summary, MAR and Recent Results was reviewed with the receiving nurse. Opportunity for questions and clarification was provided. Assessment completed upon patients arrival to unit and care assumed.

## 2018-08-13 NOTE — H&P
History and Physical    Patient: Juan Diego Hensley MRN: [id number]  SSN: [id number]    YOB: 1924  Age: 80 y.o. Sex: female        Date of Admission: 8/13/2018  4:57 AM  Primary Care Physician: Mary Ellen Ramirez MD  Attending on Admission: No att. providers found     Subjective   History of Present Illness: Juan Diego Hensley is a 80 y.o. female with PMH of afib and pulmonary fibrosis on 2L of O2 continuously who was brought to the ED by EMS after a syncopal event. Pt lives alone with 24h care from [address] [place] [address]. The caregiver who was with her when this occurred at 2am is not present, but the day shift caregiver is at the bedside. Pt went to the bathroom but due to chronic weakness in her L knee she had trouble standing from the toilet. She slumped down and fell back on the toilet. It is unclear if she lost consciousness. When EMS arrived, pt was found to be bradycardic in the 30s with O2 sat of 68%. EMS bagged her and then brought her to the ED. HR now in the 70s and she is sating well on 3L O2. Pt denies CP, palpitations, HA, dizziness, dysuria, increased urinary frequency, diarrhea, abdominal pain, fevers or arm/jaw pain. She does have dyspnea at baseline from pulmonary fibrosis. She also has a chronic cough that is worse in the ams. She complains only of being weak at this time. In the ED lactate was found to be elevated to 4.1, K 5.5 and TSH 7.9. She was given vanc and Zosyn and a liter of NS in the ED. Review of Systems:  10 point review of systems was obtained and was positive for probable syncope, bradycardia, dyspnea, cough, hypoxemia but was otherwise negative unless mentioned in the HPI.      Objective   Past Medical History:   Past Medical History:   Diagnosis Date    Arthritis     Cancer ([address]     pre-cancerous cyst on pancreas    Exertional dyspnea     HBP (high blood pressure)     Hypothyroid     PE (pulmonary embolism) 2000    In [facility] 10 days - blood thinner    TB (pulmonary tuberculosis) 1930   CKD    Past Surgical History:  Past Surgical History:   Procedure Laterality Date    HX APPENDECTOMY  1940    HX BACK SURGERY  2016    cyst removal; burst     HX CARPAL TUNNEL RELEASE Bilateral     HX  SECTION      HX HIP REPLACEMENT Left 2007    HX TONSIL AND ADENOIDECTOMY  1931       Past Family and Social History:  Social History     Social History    Marital status: SINGLE     Spouse name: N/A    Number of children: N/A    Years of education: N/A     Occupational History    Not on file. Social History Main Topics    Smoking status: Never Smoker    Smokeless tobacco: Never Used      Comment: pt states that she smoked socailly for 3 years    Alcohol use No    Drug use: No    Sexual activity: Not on file     Other Topics Concern    Pt lives alone with 24h care from [address] [place] [address]. Social History Narrative     Family History   Problem Relation Age of Onset    Tuberculosis Father     Cancer Mother     No Known Problems Sister     No Known Problems Brother        Allergy:     Allergies   Allergen Reactions    Methotrexate Other (comments)     Damaged kidney and liver    Sulfa (Sulfonamide Antibiotics) Hives       Medications:  UNVERIFIED    No current facility-administered medications on file prior to encounter. Current Outpatient Prescriptions on File Prior to Encounter   Medication Sig Dispense Refill    levothyroxine (SYNTHROID) 75 mcg tablet TAKE 1 TABLET BY MOUTH DAILY BEFORE BREAKFAST 90 Tab 0    carvedilol (COREG) 3.125 mg tablet TAKE 1 TABLET BY MOUTH TWICE DAILY WITH MEALS 180 Tab 2    budesonide-formoterol (SYMBICORT) 160-4.5 mcg/actuation HFAA INHALE TWO PUFFS BY MOUTH TWICE A DAY 30.6 Inhaler 11    albuterol (PROAIR HFA) 90 mcg/actuation inhaler Take 1 Puff by inhalation every six (6) hours as needed for Wheezing. 1 Inhaler 11    naproxen sodium (ALEVE) 220 mg cap Take  by mouth.       furosemide (LASIX) 40 mg tablet TAKE 1 TABLET BY MOUTH EVERY MORNING, AND A 1/2 TABLET IN THE AFTERNOON. (Patient taking differently: TAKE 1 TABLET BY MOUTH EVERY MORNING,) 135 Tab 1    sodium chloride (SALINE MIST NA) 1 Spray by Nasal route as needed (dryness ).  glucosamine/chondr fishman A sod (OSTEO BI-FLEX PO) Take 2 Caps by mouth daily.  lisinopril (PRINIVIL, ZESTRIL) 20 mg tablet TAKE 1 TABLET BY MOUTH DAILY 90 Tab 11    OTHER,NON-FORMULARY, Osteo-biflex      COMPR. STOCKING,THIGH,REG,LARGE (COMP. STOCKING,THIGH,REG,LARGE) misc 2 Each by Does Not Apply route daily. 2 Each 0    magnesium oxide (MAG-OX) 400 mg tablet Take 1 Tab by mouth two (2) times a day. 180 Tab 3    aspirin delayed-release 81 mg tablet Take 1 Tab by mouth daily. 90 Tab 3    OXYGEN-AIR DELIVERY SYSTEMS Take 2 L by inhalation continuous. via NC      albuterol-ipratropium (DUO-NEB) 2.5 mg-0.5 mg/3 ml nebu 3 mL by Nebulization route every six (6) hours as needed. (Patient taking differently: 3 mL by Nebulization route every six (6) hours as needed (wheezing and shortness of breath). ) 30 Nebule 2        Physical Examination   Vital signs:   Visit Vitals    /66    Pulse 85    Temp 97.5 °F (36.4 °C)    Resp 18    Ht 5' 4\" (1.626 m)    Wt 78.9 kg (174 lb)    SpO2 100%    BMI 29.87 kg/m2    Weight: 78.9 kg (174 lb) with Body mass index is 29.87 kg/(m^2). General: elderly female sitting up in bed, NAD   HENT: Normocephalic and atraumatic. Pupils are equal, round, and reactive to light. Extraocular movements intact. Oropharynx pink/slightly dry. Neck: Normal range of motion. Neck supple. Cardiovascular: Regular rate and rhythm; no murmurs, rubs, gallops. Pulmonary/Chest: No increased work of breathing. No respiratory distress. Fines crackles in all posterior lung fields, expiratory wheezing. Abdominal: Soft, non distended, non tender. Obese. Extremities: No edema. Neurological: Cranial nerves II-XII intact. No focal deficits.     Skin: Warm, intact. No rashes/lesions. Data   Pertinent Labs:  Recent Results (from the past 12 hour(s))   EKG, 12 LEAD, INITIAL    Collection Time: 08/13/18  4:55 AM   Result Value Ref Range    Ventricular Rate 108 BPM    Atrial Rate 326 BPM    QRS Duration 74 ms    Q-T Interval 300 ms    QTC Calculation (Bezet) 402 ms    Calculated R Axis 100 degrees    Calculated T Axis 4 degrees    Diagnosis       !! AGE AND GENDER SPECIFIC ECG ANALYSIS !! Atrial fibrillation with rapid ventricular response  Rightward axis  Nonspecific T wave abnormality , probably digitalis effect  Abnormal ECG  When compared with ECG of 17-JAN-2018 17:17,  Nonspecific T wave abnormality, worse in Inferior leads  Nonspecific T wave abnormality now evident in Lateral leads  QT has shortened     POC LACTIC ACID    Collection Time: 08/13/18  5:03 AM   Result Value Ref Range    Lactic Acid (POC) 4.1 (H) 0.5 - 1.9 mmol/L   POC TROPONIN-I    Collection Time: 08/13/18  5:04 AM   Result Value Ref Range    Troponin-I (POC) 0.04 0.02 - 0.05 ng/ml   BNP    Collection Time: 08/13/18  5:07 AM   Result Value Ref Range     pg/mL   CBC WITH AUTOMATED DIFF    Collection Time: 08/13/18  5:07 AM   Result Value Ref Range    WBC 13.0 (H) 4.3 - 11.1 K/uL    RBC 3.77 (L) 4.05 - 5.2 M/uL    HGB 10.6 (L) 11.7 - 15.4 g/dL    HCT 35.3 (L) 35.8 - 46.3 %    MCV 93.6 79.6 - 97.8 FL    MCH 28.1 26.1 - 32.9 PG    MCHC 30.0 (L) 31.4 - 35.0 g/dL    RDW 16.3 %    PLATELET 681 136 - 150 K/uL    MPV 11.9 9.4 - 12.3 FL    ABSOLUTE NRBC 0.00 0.0 - 0.2 K/uL    DF AUTOMATED      NEUTROPHILS 85 (H) 43 - 78 %    LYMPHOCYTES 8 (L) 13 - 44 %    MONOCYTES 5 4.0 - 12.0 %    EOSINOPHILS 2 0.5 - 7.8 %    BASOPHILS 0 0.0 - 2.0 %    IMMATURE GRANULOCYTES 1 0.0 - 5.0 %    ABS. NEUTROPHILS 11.0 K/UL    ABS. LYMPHOCYTES 1.0 K/UL    ABS. MONOCYTES 0.6 K/UL    ABS. EOSINOPHILS 0.3 K/UL    ABS. BASOPHILS 0.1 K/UL    ABS. IMM.  GRANS. 0.1 K/UL   METABOLIC PANEL, COMPREHENSIVE    Collection Time: 08/13/18  5:07 AM   Result Value Ref Range    Sodium 143 136 - 145 mmol/L    Potassium 5.5 (H) 3.5 - 5.1 mmol/L    Chloride 107 98 - 107 mmol/L    CO2 26 21 - 32 mmol/L    Anion gap 10 7 - 16 mmol/L    Glucose 167 (H) 65 - 100 mg/dL    BUN 34 (H) 8 - 23 MG/DL    Creatinine 1.89 (H) 0.6 - 1.0 MG/DL    GFR est AA 32 (L) >60 ml/min/1.73m2    GFR est non-AA 26 (L) >60 ml/min/1.73m2    Calcium 8.2 (L) 8.3 - 10.4 MG/DL    Bilirubin, total 0.3 0.2 - 1.1 MG/DL    ALT (SGPT) 16 12 - 65 U/L    AST (SGOT) 36 15 - 37 U/L    Alk. phosphatase 67 50 - 136 U/L    Protein, total 6.9 6.3 - 8.2 g/dL    Albumin 2.4 (L) 3.2 - 4.6 g/dL    Globulin 4.5 (H) 2.3 - 3.5 g/dL    A-G Ratio 0.5 (L) 1.2 - 3.5     PROTHROMBIN TIME + INR    Collection Time: 08/13/18  5:07 AM   Result Value Ref Range    Prothrombin time 14.3 11.5 - 14.5 sec    INR 1.2     PROCALCITONIN    Collection Time: 08/13/18  5:07 AM   Result Value Ref Range    Procalcitonin <0.1 ng/mL   TSH 3RD GENERATION    Collection Time: 08/13/18  5:07 AM   Result Value Ref Range    TSH 7.870 (H) 0.358 - 3.740 uIU/mL       Imaging Studies:     CT HEAD WO CONT   Final Result   IMPRESSION:      No acute intracranial abnormalities. Date of Dictation: 8/13/2018 5:49 AM         XR CHEST PORT   Final Result   IMPRESSION: Interstitial pulmonary edema worse in the interval.                Chest X-Ray: personally reviewed, read as pulmonary edema but interstitial changes may be from pulmonary fibrosis. EKG: personally reviewed, afib with HRs in the 100s    Emergency Department course:     Medications   sodium chloride 0.9 % bolus infusion 1,000 mL (0 mL IntraVENous IV Completed 8/13/18 0808)   vancomycin (VANCOCIN) 1750 mg in  ml infusion (0 mg IntraVENous IV Completed 8/13/18 5579)        Assessment and Plan   Omar Worthy is a 80 y.o. female with PMH of afib, PE and pulmonary fibrosis being admitted for syncopal evnt.     Syncope - Suspected, but unclear if she lost consciousness. VS changes upon EMS arrival may be from vasovagal syncopal episode. Monitor on telemetry. Infectious etiology of low probability but lactate elevated which could just be from syncopal episode/dehdyration. Echo. BCx and UA pending. PE also considered but no other symptoms and WELLS score only 1.5 for h/o PE 18 years ago. Pulmonary fibrosis with hypoxemia - Caregiver who was present at time of episode is no longer here. It is unclear whether pt was wearing O2 when EMS arrived but she normally wears it 24/7. Pt now sating well. She does have some wheezing on exam. Schedule Duonebs, continue home Symbicort, prn albuterol. Will give prednisone for a few days. Continue with supplemental O2. Afib - Pt bradycardic this am but HR now in the 70s. Hold BB for now. If HR rises, will resume. Pt not on AC. Hyperkalemia - Repeat level this afternoon. Hold home Lasix with appearance of hypovolemia on exam.     CKD - Cr around baseline of 1.7. Monitor. Hypothyroidism - TSH elevated but can be an acute phase reactant. This needs to be repeated as an OP in 4 weeks. Continue home dose of Synthroid. Anemia - Hgb up from last value recorded. Chronic anemia. DVT ppx - heparin SQ  DNR  POA is katerina Contreras [phone]    Patient will be admitted as observation as expected hospitalization is less than 2 midnights for workup of syncope. On the date of admission approximately 55 minutes was spent with this patient with greater than 50% of time spent on counseling and coordination of care.     Loli Juares MD  [facility] Hospitalist  08/13/18, 8:17 AM

## 2018-08-13 NOTE — ED TRIAGE NOTES
Pt at home. Caregiver called for pt being unconscious. Per the caregiver- pt walked up to the bathroom and went limp. Pt was hypoxic at 60%. opa placed and ems started to bag. Pts hr was also in the 30s. After the pt was bagged, her spo2 went up and hr went up. Per caregiver- pt has bedsores that aren't healing. Afebrile with ems. Pt c/o left leg pain. Zosyn 3.375 given. 18 left ac. 18 right forearm.

## 2018-08-13 NOTE — PROGRESS NOTES
08/13/18 0934   Dual Skin Pressure Injury Assessment   Dual Skin Pressure Injury Assessment X   Second Care Provider (Based on [address] [place] [address]) Latasha Jung RN   Sacrum  Left Side;Right Side

## 2018-08-13 NOTE — ED PROVIDER NOTES
HPI Comments: Patient with history of high blood pressure and atrial fibrillation. On Coreg. tthis is managed by her primary care doctor. She stood up in the middle the night to use the restroom. She is with her caretaker at the time. She became very weak  And was guided to the floor. She then became poorly responsive and EMS was called out. EMS noted heart rate in the 30s  And hypoxic in the upper 60s. Immediately started  Bagging the patient with improvement in both hypoxia and respiratory rate. Patient was then tachycardic with her atrial fibrillation. She was improving in route able to talk with EMS personnel. Patient able to answer my questions with no chest pain or shortness of breath. Denies any abdominal pain. Denies any headache. Still slightly disoriented. Patient is a 80 y.o. female presenting with syncope and altered mental status. The history is provided by the patient and the EMS personnel. No  was used. Syncope    This is a new problem. The current episode started less than 1 hour ago. The problem occurs constantly. The problem has been resolved. She lost consciousness for a period of 1 to 5 minutes. The problem is associated with standing up. Associated symptoms include confusion and weakness. Pertinent negatives include no visual change, no chest pain, no abdominal pain, no headaches, no seizures and no head injury. She has tried nothing for the symptoms. Her past medical history is significant for HTN. Altered mental status    This is a new problem. The current episode started less than 1 hour ago. The problem has been gradually improving. Associated symptoms include confusion, unresponsiveness and weakness. Pertinent negatives include no seizures. Her past medical history is significant for hypertension.         Past Medical History:   Diagnosis Date    Arthritis     Cancer ([facility] [address]     pre-cancerous cyst on pancreas    Exertional dyspnea     HBP (high blood pressure)     Hypothyroid     PE (pulmonary embolism) 2000    In [facility] - blood thinner    TB (pulmonary tuberculosis) 1930       Past Surgical History:   Procedure Laterality Date    HX APPENDECTOMY  1940    HX BACK SURGERY  2016    cyst removal; burst     HX CARPAL TUNNEL RELEASE Bilateral     HX  SECTION      HX HIP REPLACEMENT Left 2007    HX TONSIL AND ADENOIDECTOMY  1931         Family History:   Problem Relation Age of Onset    Tuberculosis Father     Cancer Mother     No Known Problems Sister     No Known Problems Brother        Social History     Social History    Marital status: SINGLE     Spouse name: N/A    Number of children: N/A    Years of education: N/A     Occupational History    Not on file. Social History Main Topics    Smoking status: Never Smoker    Smokeless tobacco: Never Used      Comment: pt states that she smoked socailly for 3 years    Alcohol use No    Drug use: No    Sexual activity: Not on file     Other Topics Concern    Not on file     Social History Narrative         ALLERGIES: Methotrexate and Sulfa (sulfonamide antibiotics)    Review of Systems   Unable to perform ROS: Mental status change   Cardiovascular: Positive for syncope. Negative for chest pain. Gastrointestinal: Negative for abdominal pain. Neurological: Positive for weakness. Negative for seizures and headaches. Psychiatric/Behavioral: Positive for confusion. Vitals:    18 0456   BP: 97/52   Pulse: (!) 106   Resp: 18   SpO2: 90%   Weight: 78.9 kg (174 lb)   Height: 5' 4\" (1.626 m)            Physical Exam   Constitutional: She appears well-developed and well-nourished. HENT:   Head: Normocephalic and atraumatic. Eyes: Conjunctivae and EOM are normal. Pupils are equal, round, and reactive to light. Neck: Normal range of motion. Neck supple. Cardiovascular: An irregularly irregular rhythm present. Tachycardia present.     No murmur heard.  Pulmonary/Chest: Effort normal and breath sounds normal. She has no wheezes. Abdominal: Soft. Bowel sounds are normal. There is no tenderness. Musculoskeletal: Normal range of motion. She exhibits no edema. Neurological: She is alert. She is disoriented. No cranial nerve deficit. Skin: Skin is warm and dry. Nursing note and vitals reviewed. MDM  Number of Diagnoses or Management Options  Diagnosis management comments: Patient with an episode of unresponsiveness bradycardia and hypoxia. Improved with respiratory support from EMS. Has atrial fibrillation here seen in prior notes. Has pulmonary edema on chest x-ray with BNP of 264. Has mild hyperkalemia. Has hypothyroidism with elevated TSH. Will admit for further treatment. Amount and/or Complexity of Data Reviewed  Clinical lab tests: ordered and reviewed  Tests in the radiology section of CPT®: ordered and reviewed  Tests in the medicine section of CPT®: ordered and reviewed    Patient Progress  Patient progress: stable        ED Course       Procedures    EKG: nonspecific ST and T waves changes, atrial fibrillation, rate 108. CT HEAD WO CONT (Final result) Result time: 08/13/18 05:50:45     Final result by Tabitha Ellison MD (08/13/18 05:50:45)     Impression:     IMPRESSION:    No acute intracranial abnormalities. Date of Dictation: 8/13/2018 5:49 AM       Narrative:     CT HEAD WITHOUT CONTRAST     HISTORY:  Transient alteration of awareness. COMPARISON: 5/20/2017    TECHNIQUE: Axial imaging was performed without intravenous contrast utilizing  5mm slice thickness. Sagittal and coronal reformats were performed. Radiation  dose reduction techniques were used for this study. Our CT scanner uses one or  all of the following:    Automated exposure control, adjustment of the MAS or KUB according to patient's  size and iterative reconstruction.     FINDINGS:        *BRAIN:      -  There are no early signs of territorial or lacunar infarction by CT.     -  Symmetric supratentorial atrophy.     -  For patient's age, the scattered areas of white matter hypodensities may  represent a chronic small vessel white matter ischemia.  However this is  nonspecific. *VISUALIZED PARANASAL SINUSES: Well aerated. *MASTOIDS:  Clear. *CALVARIUM AND SCALP: Unremarkable.                   XR CHEST PORT (Final result) Result time: 08/13/18 05:48:24     Final result by Efraín Bailey MD (08/13/18 05:48:24)     Impression:     IMPRESSION: Interstitial pulmonary edema worse in the interval.         Narrative:     EXAM:  XR CHEST PORT    INDICATION:  sob    COMPARISON:  1/17/2018    FINDINGS: A portable AP radiograph of the chest was obtained at 0457 hours.  The  patient is on a cardiac monitor.  Diffuse increased interstitial markings worse  in the interval.  The cardiac and mediastinal contours and pulmonary vascularity  are normal.  The bones and soft tissues are grossly within normal limits.                 Results Include:    Recent Results (from the past 24 hour(s))   POC LACTIC ACID    Collection Time: 08/13/18  5:03 AM   Result Value Ref Range    Lactic Acid (POC) 4.1 (H) 0.5 - 1.9 mmol/L   POC TROPONIN-I    Collection Time: 08/13/18  5:04 AM   Result Value Ref Range    Troponin-I (POC) 0.04 0.02 - 0.05 ng/ml   BNP    Collection Time: 08/13/18  5:07 AM   Result Value Ref Range     pg/mL   CBC WITH AUTOMATED DIFF    Collection Time: 08/13/18  5:07 AM   Result Value Ref Range    WBC 13.0 (H) 4.3 - 11.1 K/uL    RBC 3.77 (L) 4.05 - 5.2 M/uL    HGB 10.6 (L) 11.7 - 15.4 g/dL    HCT 35.3 (L) 35.8 - 46.3 %    MCV 93.6 79.6 - 97.8 FL    MCH 28.1 26.1 - 32.9 PG    MCHC 30.0 (L) 31.4 - 35.0 g/dL    RDW 16.3 %    PLATELET 966 304 - 544 K/uL    MPV 11.9 9.4 - 12.3 FL    ABSOLUTE NRBC 0.00 0.0 - 0.2 K/uL    DF AUTOMATED      NEUTROPHILS 85 (H) 43 - 78 %    LYMPHOCYTES 8 (L) 13 - 44 %    MONOCYTES 5 4.0 - 12.0 %    EOSINOPHILS 2 0.5 - 7.8 %    BASOPHILS 0 0.0 - 2.0 %    IMMATURE GRANULOCYTES 1 0.0 - 5.0 %    ABS. NEUTROPHILS 11.0 K/UL    ABS. LYMPHOCYTES 1.0 K/UL    ABS. MONOCYTES 0.6 K/UL    ABS. EOSINOPHILS 0.3 K/UL    ABS. BASOPHILS 0.1 K/UL    ABS. IMM. GRANS. 0.1 K/UL   METABOLIC PANEL, COMPREHENSIVE    Collection Time: 08/13/18  5:07 AM   Result Value Ref Range    Sodium 143 136 - 145 mmol/L    Potassium 5.5 (H) 3.5 - 5.1 mmol/L    Chloride 107 98 - 107 mmol/L    CO2 26 21 - 32 mmol/L    Anion gap 10 7 - 16 mmol/L    Glucose 167 (H) 65 - 100 mg/dL    BUN 34 (H) 8 - 23 MG/DL    Creatinine 1.89 (H) 0.6 - 1.0 MG/DL    GFR est AA 32 (L) >60 ml/min/1.73m2    GFR est non-AA 26 (L) >60 ml/min/1.73m2    Calcium 8.2 (L) 8.3 - 10.4 MG/DL    Bilirubin, total 0.3 0.2 - 1.1 MG/DL    ALT (SGPT) 16 12 - 65 U/L    AST (SGOT) 36 15 - 37 U/L    Alk.  phosphatase 67 50 - 136 U/L    Protein, total 6.9 6.3 - 8.2 g/dL    Albumin 2.4 (L) 3.2 - 4.6 g/dL    Globulin 4.5 (H) 2.3 - 3.5 g/dL    A-G Ratio 0.5 (L) 1.2 - 3.5     PROTHROMBIN TIME + INR    Collection Time: 08/13/18  5:07 AM   Result Value Ref Range    Prothrombin time 14.3 11.5 - 14.5 sec    INR 1.2     TSH 3RD GENERATION    Collection Time: 08/13/18  5:07 AM   Result Value Ref Range    TSH 7.870 (H) 0.358 - 3.740 uIU/mL

## 2018-08-14 NOTE — PROGRESS NOTES
Problem: Self Care Deficits Care Plan (Adult)  Goal: *Acute Goals and Plan of Care (Insert Text)    OCCUPATIONAL THERAPY: Initial Assessment and Discharge 8/14/2018  OBSERVATION: Hospital Day: 2  Payor: Gonzalo Kocher / Plan: Gorge Daniel MEDICARE COMPLETE / Product Type: Managed Care Medicare /      NAME/AGE/GENDER: Maddy Aguilar is a 80 y.o. female   PRIMARY DIAGNOSIS:  Syncope Syncope Syncope        ICD-10: Treatment Diagnosis:    · History of falling (Z91.81)  · Dizziness and Giddiness (R42)   Precautions/Allergies:     Methotrexate and Sulfa (sulfonamide antibiotics)      ASSESSMENT:     Ms. Osorio Dos Santos is a 80year old female admitted with syncope and hypoxia. At baseline she lives in her own home with 24/7 caregivers. The caregivers provide assistance with all ADLs including sponge bathing, dressing, toileting, supervision for feeding, and transfers. She lives a sedentary lifestyle and spends most of her time in the recliner. She is non ambulatory and only transfers from chair to bedside commode with assist x1. History provided by caregiver at bedside. Patient seated in chair upon arrival, agreeable to OT evaluation. Appears somewhat confused but is oriented to person, place, and year. /70. Reports no pain. BUE assessment reveals ROM, strength, coordination all decreased but functional. Limited ROM to L digits due to arthritis. Patient appears to be functioning at her baseline for ADLs. Will defer to PT to address transfers. No other acute OT needs identified. This section established at most recent assessment   PROBLEM LIST (Impairments causing functional limitations):  1. Decreased Transfer Abilities     Defer to PT to address    INTERVENTIONS PLANNED: (Benefits and precautions of occupational therapy have been discussed with the patient.)  1.  None        RECOMMENDED REHABILITATION/EQUIPMENT: (at time of discharge pending progress): Due to the probability of continued deficits (see above) this patient will not likely need continued skilled occupational therapy after discharge. Equipment:    None at this time              OCCUPATIONAL PROFILE AND HISTORY:   History of Present Injury/Illness (Reason for Referral):  See H&P  Past Medical History/Comorbidities:   Ms. Cayla Alaniz  has a past medical history of Arthritis; Cancer ([address] Exertional dyspnea; HBP (high blood pressure); Hypothyroid; PE (pulmonary embolism) (); and TB (pulmonary tuberculosis) (1930). She also has no past medical history of Adverse effect of anesthesia; Difficult intubation; Hematuria, microscopic; Kidney stone; Malignant hyperthermia due to anesthesia; Nausea & vomiting; or Pseudocholinesterase deficiency. Ms. Cayla Alaniz  has a past surgical history that includes hx appendectomy (1940); hx tonsil and adenoidectomy (193); hx hip replacement (Left, 2007); hx  section; hx carpal tunnel release (Bilateral); and hx back surgery (2016). Social History/Living Environment:   Home Environment: Private residence  # Steps to Enter: 1  One/Two Story Residence: One story  Living Alone: Yes  Support Systems: Home care staff  Patient Expects to be Discharged to[de-identified] Private residence  Current DME Used/Available at Home: [employer], bedside, [facility], [address] [facility] bed  Tub or Shower Type:  (sponge bath )  Prior Level of Function/Work/Activity:  At baseline she lives in her own home with 24/7 caregivers. The caregivers provide assistance with all ADLs including sponge bathing, dressing, toileting, supervision for feeding, and transfers. She lives a sedentary lifestyle and spends most of her time in the recliner. She is non ambulatory and only transfers from chair to bedside commode with assist x1.       Number of Personal Factors/Comorbidities that affect the Plan of Care: Extensive review of physical, cognitive, and psychosocial performance (3+):  HIGH COMPLEXITY   ASSESSMENT OF OCCUPATIONAL PERFORMANCE[de-identified]   Activities of Daily Living:   Basic ADLs (From Assessment) Complex ADLs (From Assessment)   Feeding: Minimum assistance  Oral Facial Hygiene/Grooming: Minimum assistance  Bathing: Maximum assistance  Upper Body Dressing: Maximum assistance  Lower Body Dressing: Total assistance  Toileting: Maximum assistance Instrumental ADL  Meal Preparation: Total assistance  Homemaking: Total assistance  Medication Management: Total assistance  Financial Management: Total assistance   Grooming/Bathing/Dressing Activities of Daily Living     Cognitive Retraining  Safety/Judgement: Awareness of environment; Fall prevention                       Bed/Mat Mobility  Supine to Sit: Minimum assistance  Sit to Stand: Moderate assistance;Assist x2  Bed to Chair: Moderate assistance;Assist x2  Scooting: Maximum assistance       Most Recent Physical Functioning:   Gross Assessment:  AROM: Generally decreased, functional (BUE except limited L hand due to arthritis )  Strength: Generally decreased, functional  Coordination: Generally decreased, functional               Posture:  Posture (WDL): Exceptions to WDL  Posture Assessment: Kyphosis, Forward head, Rounded shoulders  Balance:  Sitting: Impaired  Sitting - Static: Fair (occasional)  Sitting - Dynamic: Fair (occasional)  Standing: Impaired  Standing - Static: Poor  Standing - Dynamic : Poor Bed Mobility:  Supine to Sit: Minimum assistance  Scooting: Maximum assistance  Wheelchair Mobility:     Transfers:  Sit to Stand: Moderate assistance;Assist x2  Stand to Sit: Moderate assistance;Assist x2  Bed to Chair: Moderate assistance;Assist x2  Interventions: Safety awareness training; Tactile cues; Verbal cues            Patient Vitals for the past 6 hrs:   BP BP Patient Position SpO2 Pulse   08/14/18 0800 (!) 138/95 At rest 99 % (!) 110   08/14/18 1200 130/79 Sitting 98 % (!) 105       Mental Status  Neurologic State: Alert  Orientation Level: Oriented to person, Disoriented to place, Disoriented to situation  Cognition: Follows commands  Perception: Appears intact  Perseveration: No perseveration noted  Safety/Judgement: Awareness of environment, Fall prevention                          Physical Skills Involved:  1. Balance  2. Strength Cognitive Skills Affected (resulting in the inability to perform in a timely and safe manner):  1. Executive Function  2. Long Term Memory  3. Comprehension Psychosocial Skills Affected:  1. Non   Number of elements that affect the Plan of Care: 3-5:  MODERATE COMPLEXITY   CLINICAL DECISION MAKIN[address] [place] [address] AM-PAC 6 Clicks   Daily Activity Inpatient Short Form  How much help from another person does the patient currently need. .. Total A Lot A Little None   1. Putting on and taking off regular lower body clothing? [x] 1   [] 2   [] 3   [] 4   2. Bathing (including washing, rinsing, drying)? [x] 1   [] 2   [] 3   [] 4   3. Toileting, which includes using toilet, bedpan or urinal?   [x] 1   [] 2   [] 3   [] 4   4. Putting on and taking off regular upper body clothing? [x] 1   [] 2   [] 3   [] 4   5. Taking care of personal grooming such as brushing teeth? [] 1   [x] 2   [] 3   [] 4   6. Eating meals? [] 1   [x] 2   [] 3   [] 4   © , Trustees of [address] [place] [address], under license to [employer]. All rights reserved      Score:  Initial:  8 Most Recent: X (Date: -- )    Interpretation of Tool:  Represents activities that are increasingly more difficult (i.e. Bed mobility, Transfers, Gait). Score 24 23 22-20 19-15 14-10 9-7 6     Modifier CH CI CJ CK CL CM CN      ?  Self Care:     - CURRENT STATUS: CM - 80%-99% impaired, limited or restricted    - GOAL STATUS: CM - 80%-99% impaired, limited or restricted    - D/C STATUS:  CM - 80%-99% impaired, limited or restricted  Payor: Anjum Mccoy / Plan: [address] / Product Type: Managed Care Medicare /       Use of outcome tool(s) and clinical judgement create a POC that gives a: HIGH COMPLEXITY         TREATMENT:   (In addition to Assessment/Re-Assessment sessions the following treatments were rendered)     Pre-treatment Symptoms/Complaints:  None   Pain: Initial:   Pain Intensity 1: 0  Post Session:  none     Assessment/Reassessment only, no treatment provided today    Braces/Orthotics/Lines/Etc:   · O2 Device: Nasal cannula  Treatment/Session Assessment:    · Response to Treatment:  Tolerated well   · Interdisciplinary Collaboration:   o Occupational Therapist  o Registered Nurse  o Certified Nursing Assistant/Patient Care Technician  · After treatment position/precautions:   o Up in chair  o Bed alarm/tab alert on  o Bed/Chair-wheels locked  o Caregiver at bedside  o Call light within reach  o RN notified     Total Treatment Duration:  OT Patient Time In/Time Out  Time In: 1052  Time Out: [place], OTR/L

## 2018-08-14 NOTE — PROGRESS NOTES
Problem: Pressure Injury - Risk of  Goal: *Prevention of pressure injury  Document Alonso Scale and appropriate interventions in the flowsheet. Outcome: Progressing Towards Goal  Pressure Injury Interventions:  Sensory Interventions: Assess changes in LOC, Assess need for specialty bed, Check visual cues for pain, Discuss PT/OT consult with provider, Keep linens dry and wrinkle-free, Maintain/enhance activity level, Minimize linen layers, Monitor skin under medical devices, Pad between skin to skin, Pressure redistribution bed/mattress (bed type), Sit a 90-degree angle/use footstool if needed    Moisture Interventions: Absorbent underpads, Apply protective barrier, creams and emollients, Limit adult briefs, Maintain skin hydration (lotion/cream), Minimize layers    Activity Interventions: Increase time out of bed, Pressure redistribution bed/mattress(bed type), PT/OT evaluation    Mobility Interventions: HOB 30 degrees or less, Pressure redistribution bed/mattress (bed type), PT/OT evaluation    Nutrition Interventions: Document food/fluid/supplement intake    Friction and Shear Interventions: Apply protective barrier, creams and emollients, Feet elevated on foot rest, Foam dressings/transparent film/skin sealants, HOB 30 degrees or less, Minimize layers, Sit at 90-degree angle               Problem: Falls - Risk of  Goal: *Absence of Falls  Document Harry Fall Risk and appropriate interventions in the flowsheet.    Outcome: Progressing Towards Goal  Fall Risk Interventions:  Mobility Interventions: Bed/chair exit alarm, OT consult for ADLs, PT Consult for mobility concerns, Patient to call before getting OOB, PT Consult for assist device competence, Strengthening exercises (ROM-active/passive), Utilize walker, cane, or other assistive device         Medication Interventions: Assess postural VS orthostatic hypotension, Bed/chair exit alarm, Patient to call before getting OOB, Teach patient to arise slowly    Elimination Interventions: Bed/chair exit alarm, Call light in reach, Patient to call for help with toileting needs, Toilet paper/wipes in reach

## 2018-08-14 NOTE — PROGRESS NOTES
Progress Note    Patient: Suzy Hickman MRN: [id number]  SSN: [id number]    YOB: 1924  Age: 80 y.o. Sex: female      Admit Date: 8/13/2018    LOS: 0 days     Subjective:     Pt just awoke and is not sure how she feels today. She is on BL of 2L but with wheezing which she said is present all the time. HR up to the 120s in afib. No other events on telemetry. Objective:     Vitals:    08/14/18 0418 08/14/18 0532 08/14/18 0741 08/14/18 0800   BP: 146/75   (!) 138/95   Pulse: (!) 126 (!) 107  (!) 110   Resp: 18   20   Temp: 98 °F (36.7 °C)   98 °F (36.7 °C)   SpO2: 96%  99% 99%   Weight:       Height:              Physical Exam:   General: elderly female sitting up in bed, NAD   HENT: Normocephalic and atraumatic. Pupils are equal, round, and reactive to light. Extraocular movements intact. Oropharynx pink/slightly dry. Neck: Normal range of motion. Neck supple. Cardiovascular: Regular rate and rhythm; no murmurs, rubs, gallops. Pulmonary/Chest: No increased work of breathing. No respiratory distress. Wheezing in all lung fields posteriorly  Abdominal: Soft, non distended, non tender. Obese. Extremities: No edema. Neurological: Cranial nerves II-XII intact. No focal deficits. Skin: Warm, intact. No rashes/lesions.        Lab/Data Review:  Recent Results (from the past 24 hour(s))   URINALYSIS W/ RFLX MICROSCOPIC    Collection Time: 08/13/18 10:16 AM   Result Value Ref Range    Color YELLOW      Appearance CLEAR      Specific gravity 1.021 1.001 - 1.023      pH (UA) 5.5 5.0 - 9.0      Protein 30 (A) NEG mg/dL    Glucose NEGATIVE  mg/dL    Ketone NEGATIVE  NEG mg/dL    Bilirubin NEGATIVE  NEG      Blood TRACE (A) NEG      Urobilinogen 0.2 0.2 - 1.0 EU/dL    Nitrites NEGATIVE  NEG      Leukocyte Esterase NEGATIVE  NEG      WBC 0-3 0 /hpf    RBC 0-3 0 /hpf    Epithelial cells 0-3 0 /hpf    Bacteria 0 0 /hpf    Casts 3-5 0 /lpf   METABOLIC PANEL, BASIC    Collection Time: 08/13/18 11:46 AM   Result Value Ref Range    Sodium 144 136 - 145 mmol/L    Potassium 4.9 3.5 - 5.1 mmol/L    Chloride 111 (H) 98 - 107 mmol/L    CO2 27 21 - 32 mmol/L    Anion gap 6 (L) 7 - 16 mmol/L    Glucose 124 (H) 65 - 100 mg/dL    BUN 33 (H) 8 - 23 MG/DL    Creatinine 1.73 (H) 0.6 - 1.0 MG/DL    GFR est AA 35 (L) >60 ml/min/1.73m2    GFR est non-AA 29 (L) >60 ml/min/1.73m2    Calcium 8.0 (L) 8.3 - 88.8 MG/DL   METABOLIC PANEL, BASIC    Collection Time: 08/14/18  4:36 AM   Result Value Ref Range    Sodium 145 136 - 145 mmol/L    Potassium 4.5 3.5 - 5.1 mmol/L    Chloride 108 (H) 98 - 107 mmol/L    CO2 28 21 - 32 mmol/L    Anion gap 9 7 - 16 mmol/L    Glucose 139 (H) 65 - 100 mg/dL    BUN 36 (H) 8 - 23 MG/DL    Creatinine 1.97 (H) 0.6 - 1.0 MG/DL    GFR est AA 30 (L) >60 ml/min/1.73m2    GFR est non-AA 25 (L) >60 ml/min/1.73m2    Calcium 8.1 (L) 8.3 - 10.4 MG/DL   CBC W/O DIFF    Collection Time: 08/14/18  4:36 AM   Result Value Ref Range    WBC 11.2 (H) 4.3 - 11.1 K/uL    RBC 3.21 (L) 4.05 - 5.2 M/uL    HGB 9.1 (L) 11.7 - 15.4 g/dL    HCT 30.1 (L) 35.8 - 46.3 %    MCV 93.8 79.6 - 97.8 FL    MCH 28.3 26.1 - 32.9 PG    MCHC 30.2 (L) 31.4 - 35.0 g/dL    RDW 16.5 %    PLATELET 135 332 - 888 K/uL    MPV 12.3 9.4 - 12.3 FL    ABSOLUTE NRBC 0.00 0.0 - 0.2 K/uL       Assessment:   Ms. Carmella Marley is a 80 y.o. female with PMH of pulmonary fibrosis, afib and remote PE admitted for fall with concern for syncope. Plan:   Syncope - Suspected, but unclear if she lost consciousness. VS changes upon EMS arrival may be from vasovagal syncopal episode. HR no longer bradycardic but elevated to the 120s. Echo unremarkable, UA clean. Infectious etiology not suspected. There may be a component of dehydration that drove fall/syncope.  PE also considered but no other symptoms and WELLS score only 1.5 for h/o PE 18 years ago.     Pulmonary fibrosis with hypoxemia - It is unclear whether pt was wearing O2 when EMS arrived but she normally wears it 24/7. Pt sating well on home O2 but has a lot of wheezing. Prednisone started. Scheduled Duonebs, continue home Symbicort, prn albuterol. Lasix will also be resumed at 40mg PO BID.     Afib - Pt bradycardic yesterday and BB held but now with afib with RVR and Coreg will be restarted. With bradycardia on presentation, pt may benefit from an event monitor to r/o tachy/javid syndrome. Pt not on AC.      Hyperkalemia - Resolved.     CKD - Cr up to 1.9 today. Close to baseline but a little high. Restarting Lasix today.     Hypothyroidism - TSH elevated but can be an acute phase reactant. This needs to be repeated as an OP in 4 weeks. Continue home dose of Synthroid.     Anemia - Hgb up from last value recorded. Chronic anemia.        DVT ppx - heparin SQ  DNR    Signed By: Gerardo Cheng MD     August 14, 2018

## 2018-08-14 NOTE — PROGRESS NOTES
Problem: Mobility Impaired (Adult and Pediatric)  Goal: *Acute Goals and Plan of Care (Insert Text)  Acute PT Goals:  1. Patient will perform bed mobility with MINIMAL ASSISTANCE x1 within 7 days. 2. Patient will transfer bed to chair with MINIMAL ASSISTANCE x1 within 7 days. 3. Patient will demonstrate FAIR DYNAMIC STANDING balance within 7 day(s). 4. Patient will tolerate 25+ minutes of therapeutic activity/exercise and/or neuromuscular re-education while maintaining stable vitals to improve functional strength and activity tolerance within 7 days. PHYSICAL THERAPY: Initial Assessment, Treatment Day: Day of Assessment, AM 8/14/2018  OBSERVATION: Hospital Day: 2  Payor: [address] [place] [address] / Plan: [address] / Product Type: Managed Care Medicare /      NAME/AGE/GENDER: Iza Torres is a 80 y.o. female   PRIMARY DIAGNOSIS: Syncope Syncope Syncope        ICD-10: Treatment Diagnosis:    · Generalized Muscle Weakness (M62.81)  · Difficulty in walking, Not elsewhere classified (R26.2)  · History of falling (Z91.81)   Precaution/Allergies:  Methotrexate and Sulfa (sulfonamide antibiotics)      ASSESSMENT:     Ms. Naya Gomez is a 80year old female admitted with syncope. Patient seen this AM for initial physical therapy evaluation: presents supine in bed, oriented x2, and endorses acute on chronic pain in L knee 5/10 pain. Patient lives with 24-7 caregiver support in a single story residence. Per caregiver report, patient is mostly nonambulatory, transfers with assist x1, and required assistance x1 with modified ADLs. Today, functional weakness appreciated in BUE/LE and edema B knees L>R. Patient performed bed mobility with minimal assistance x1, scooting with maximal assistance x1,  transfers with moderate A x2, and ambulation with from bed to [facility] and to chair x4ft with RW and moderate assistance x1. Patient demonstrated a slow, step-to gait pattern with flexion and L LE buckling. Increased work of breathing with mobility, Sp02 94% on chronic 2L supplemental 02; HR: 120-134bpm s/p mobility; PCT at bedside monitoring. Ms. Dionicio Cogan is currently functioning below her 1 person assist baseline. Recommend continued skilled PT services to address stated deficits. Will follow and progress toward stated goals during acute stay. This section established at most recent assessment   PROBLEM LIST (Impairments causing functional limitations):  1. Decreased Strength  2. Decreased ADL/Functional Activities  3. Decreased Transfer Abilities  4. Decreased Ambulation Ability/Technique  5. Decreased Balance  6. Decreased Activity Tolerance  7. Increased Fatigue  8. Increased Shortness of Breath  9. Decreased Knowledge of Precautions  10. Decreased Cognition   INTERVENTIONS PLANNED: (Benefits and precautions of physical therapy have been discussed with the patient.)  1. Balance Exercise  2. Bed Mobility  3. Family Education  4. Gait Training  5. Home Exercise Program (HEP)  6. Neuromuscular Re-education/Strengthening  7. Range of Motion (ROM)  8. Therapeutic Activites  9. Therapeutic Exercise/Strengthening  10. Transfer Training  11. Group Therapy     TREATMENT PLAN: Frequency/Duration: 3 times a week for duration of hospital stay  Rehabilitation Potential For Stated Goals: Good     RECOMMENDED REHABILITATION/EQUIPMENT: (at time of discharge pending progress): Due to the probability of continued deficits (see above) this patient will likely need continued skilled physical therapy after discharge. Equipment:    None at this time              HISTORY:   History of Present Injury/Illness (Reason for Referral):  Syncope; Weakness; Difficulty in Walking  Past Medical History/Comorbidities:   Ms. Dionicio Cogan  has a past medical history of Arthritis; Cancer ([address] Exertional dyspnea; HBP (high blood pressure); Hypothyroid; PE (pulmonary embolism) (2000); and TB (pulmonary tuberculosis) (1930).  She also has no past medical history of Adverse effect of anesthesia; Difficult intubation; Hematuria, microscopic; Kidney stone; Malignant hyperthermia due to anesthesia; Nausea & vomiting; or Pseudocholinesterase deficiency. Ms. Osorio Dos Santos  has a past surgical history that includes hx appendectomy (1940); hx tonsil and adenoidectomy (1931); hx hip replacement (Left, 2007); hx  section; hx carpal tunnel release (Bilateral); and hx back surgery (2016). Social History/Living Environment:   Home Environment: Rehabilitation facility  # Steps to Enter: 1  One/Two Story Residence: One story  Living Alone: No  Support Systems: Home care staff  Patient Expects to be Discharged to[de-identified] Private residence  Current DME Used/Available at Home: [employer], bedside, Walker, [address] bed  Prior Level of Function/Work/Activity:  Patient lives with 24-7 caregiver support in a single story residence. Per caregiver report, patient is mostly nonambulatory, transfers with assist x1, and required assistance x1 with modified ADLs. Number of Personal Factors/Comorbidities that affect the Plan of Care: 1-2: MODERATE COMPLEXITY   EXAMINATION:   Most Recent Physical Functioning:   Gross Assessment:  AROM: Generally decreased, functional  Strength: Generally decreased, functional  Coordination: Generally decreased, functional               Posture:  Posture (WDL): Exceptions to WDL  Posture Assessment: Kyphosis, Forward head, Rounded shoulders  Balance:  Sitting: Impaired  Sitting - Static: Fair (occasional)  Sitting - Dynamic: Fair (occasional)  Standing: Impaired  Standing - Static: Poor  Standing - Dynamic : Poor Bed Mobility:  Supine to Sit: Minimum assistance  Scooting: Maximum assistance; Moderate assistance  Wheelchair Mobility:     Transfers:  Sit to Stand: Moderate assistance;Assist x2  Stand to Sit: Moderate assistance;Assist x2  Bed to Chair: Moderate assistance;Assist x2  Interventions: Safety awareness training; Tactile cues; Verbal cues  Gait:     Base of Support: Shift to right;Center of gravity altered; Widened  Speed/Bessy: Slow;Shuffled  Step Length: Left shortened;Right shortened  Swing Pattern: Left asymmetrical  Stance: Right increased; Left decreased  Gait Abnormalities: Antalgic;Decreased step clearance; Step to gait  Distance (ft): 4 Feet (ft)  Assistive Device: Walker, rolling  Interventions: Safety awareness training; Tactile cues; Verbal cues      Body Structures Involved:  1. Muscles Body Functions Affected:  1. Neuromusculoskeletal  2. Movement Related Activities and Participation Affected:  1. Mobility  2. Self Care   Number of elements that affect the Plan of Care: 3: MODERATE COMPLEXITY   CLINICAL PRESENTATION:   Presentation: Evolving clinical presentation with changing clinical characteristics: MODERATE COMPLEXITY   CLINICAL DECISION MAKIN [facility] Inpatient Short Form  How much difficulty does the patient currently have. .. Unable A Lot A Little None   1. Turning over in bed (including adjusting bedclothes, sheets and blankets)? [] 1   [] 2   [x] 3   [] 4   2. Sitting down on and standing up from a chair with arms ( e.g., wheelchair, bedside commode, etc.)   [] 1   [x] 2   [] 3   [] 4   3. Moving from lying on back to sitting on the side of the bed? [] 1   [x] 2   [] 3   [] 4   How much help from another person does the patient currently need. .. Total A Lot A Little None   4. Moving to and from a bed to a chair (including a wheelchair)? [] 1   [x] 2   [] 3   [] 4   5. Need to walk in hospital room? [] 1   [x] 2   [] 3   [] 4   6. Climbing 3-5 steps with a railing? [x] 1   [] 2   [] 3   [] 4   © , Trustees of [address] [place] [address] [address] [address], under license to [employer]. All rights reserved      Score:  Initial: 12 Most Recent: 12 (Date: 18 )    Interpretation of Tool:  Represents activities that are increasingly more difficult (i.e. Bed mobility, Transfers, Gait). Score 24 23 22-20 19-15 14-10 9-7 6     Modifier CH CI CJ CK CL CM CN      ? Mobility - Walking and Moving Around:     - CURRENT STATUS: CL - 60%-79% impaired, limited or restricted    - GOAL STATUS: CK - 40%-59% impaired, limited or restricted    - D/C STATUS:  ---------------To be determined---------------  Payor: [employer] COMPLETE / Plan: [address] / Product Type: [employer] Care Medicare /      Medical Necessity:     · Patient demonstrates good rehab potential due to higher previous functional level. Reason for Services/Other Comments:  · Patient continues to require skilled intervention due to decreased functional activity tolerance and mobility from baseline. .   Use of outcome tool(s) and clinical judgement create a POC that gives a: Questionable prediction of patient's progress: MODERATE COMPLEXITY            TREATMENT:   (In addition to Assessment/Re-Assessment sessions the following treatments were rendered)   Pre-treatment Symptoms/Complaints:    Pain: Initial:   Pain Intensity 1: 5  Pain Location 1: Knee  Pain Orientation 1: Left  Pain Intervention(s) 1: Position, Repositioned, Rest  Post Session:  5/10     Initial Evaluation  Therapeutic Activity: (    10 Minutes): Therapeutic activities including bed mobility, transfer training, balance training throughout ADL activity, safety awareness training, ambulation on level ground x4ft, and patient education to improve mobility and strength. Required moderate Safety awareness training; Tactile cues; Verbal cues to promote static and dynamic balance in standing. Braces/Orthotics/Lines/Etc:   · IV  · O2 Device: Nasal cannula  Treatment/Session Assessment:    · Response to Treatment:  See above.   · Interdisciplinary Collaboration:   o Physical Therapist  o Registered Nurse  · After treatment position/precautions:   o Up in chair  o Bed alarm/tab alert on  o Bed/Chair-wheels locked  o Bed in low position  o Call light within reach  o RN notified  o Family at bedside  o PCT and caregiver at bedside with chair alarm activated   · Compliance with Program/Exercises: Will assess as treatment progresses. · Recommendations/Intent for next treatment session: \"Next visit will focus on advancements to more challenging activities and reduction in assistance provided\".   Total Treatment Duration:  PT Patient Time In/Time Out  Time In: 0905  Time Out: [facility], YASMINET

## 2018-08-15 PROBLEM — D64.9 ANEMIA: Status: ACTIVE | Noted: 2018-01-01

## 2018-08-15 NOTE — PROGRESS NOTES
Per Telemetry pt's heart rate jumps to 156-158, but does not sustain. Dr Francia Narvaez made aware.

## 2018-08-15 NOTE — CONSULTS
[facility] Cardiology Consult    Attending Cardiologist:Dr. Tono Bright    Primary Cardiologist:none    Primary Care Physician:Dr Naidu                  Referring--Dr. Mary Elias--? Tachy-javid    Subjective:     Ron Naylor is a 80 y.o. female with hx of remote PE, chronic anemia, hx of PAF not on oral anticoagulation--presumptively due to anemia and fall risk. She presented via EMS last night with reported bradycardia in field and hypoxia. Reportedly pt was bagged up in field and oxygen and HR improved. NO strips on chart to review. She is noted to be in afib on admission with HR 70's. She was unaware of her irregular rhythm. However, review of ECGs in system noted AFib, NSR in 2017. Her low dose coreg has been held since admission and now noted to have afib with RVR. He denies CP. Caregiver at bedside relates that she has been caring for 6 months and this is first time she has fallen. CT head was negative.       Past Medical History:   Diagnosis Date    Arthritis     Cancer ([address]     pre-cancerous cyst on pancreas    Exertional dyspnea     HBP (high blood pressure)     Hypothyroid     PE (pulmonary embolism)     In S 10 days - blood thinner    TB (pulmonary tuberculosis) 193      Past Surgical History:   Procedure Laterality Date    HX APPENDECTOMY  194    HX BACK SURGERY  2016    cyst removal; burst     HX CARPAL TUNNEL RELEASE Bilateral     HX  SECTION      HX HIP REPLACEMENT Left     HX TONSIL AND ADENOIDECTOMY  193      Current Facility-Administered Medications   Medication Dose Route Frequency    carvedilol (COREG) tablet 3.125 mg  3.125 mg Oral BID WITH MEALS    predniSONE (DELTASONE) tablet 40 mg  40 mg Oral DAILY WITH BREAKFAST    furosemide (LASIX) tablet 40 mg  40 mg Oral ACB&D    albuterol (PROVENTIL VENTOLIN) nebulizer solution 1.25 mg  1.25 mg Nebulization Q4H PRN    albuterol-ipratropium (DUO-NEB) 2.5 MG-0.5 MG/3 ML  3 mL Nebulization Q6H RT  aspirin delayed-release tablet 81 mg  81 mg Oral DAILY    budesonide (PULMICORT) 500 mcg/2 ml nebulizer suspension  500 mcg Nebulization BID RT    levothyroxine (SYNTHROID) tablet 75 mcg  75 mcg Oral ACB    lisinopril (PRINIVIL, ZESTRIL) tablet 20 mg  20 mg Oral DAILY    magnesium oxide (MAG-OX) tablet 400 mg  400 mg Oral BID    sodium chloride (NS) flush 5-10 mL  5-10 mL IntraVENous Q8H    sodium chloride (NS) flush 5-10 mL  5-10 mL IntraVENous PRN    acetaminophen (TYLENOL) tablet 650 mg  650 mg Oral Q4H PRN    ondansetron (ZOFRAN) injection 4 mg  4 mg IntraVENous Q4H PRN    senna-docusate (PERICOLACE) 8.6-50 mg per tablet 1 Tab  1 Tab Oral DAILY PRN    heparin (porcine) injection 5,000 Units  5,000 Units SubCUTAneous Q8H    zinc oxide-cod liver oil (DESITIN) 40 % paste   Topical BID     Allergies   Allergen Reactions    Methotrexate Other (comments)     Damaged kidney and liver    Sulfa (Sulfonamide Antibiotics) Hives      Social History   Substance Use Topics    Smoking status: Never Smoker    Smokeless tobacco: Never Used      Comment: pt states that she smoked socailly for 3 years    Alcohol use No      Family History   Problem Relation Age of Onset    Tuberculosis Father     Cancer Mother     No Known Problems Sister     No Known Problems Brother         Review of Systems  Gen: Denies fever, chills, malaise or fatigue. Appetite good. HEENT: Denies frequent headaches, dizzyness, visual disturbances, Neck pain or swallowing difficulty  Lungs: Denies shortness of breath, hx of COPD, breathing problems  Cardiovascular: as above   GI: Denies hememesis, dark tarry stools, No prior Hx of GI bleed, Denies constipation  : Denies dysuria, no complaints of frequency, nocturia  Heme: No prior bleeding disorders, no prior Cancer  Neuro: Denies prior CVA, TIA. Endocrine: no diabetes, thyroid disorders  Psychiatric: Denies anxiety, or other psychiatric illnesses.      Objective:     Visit Vitals  /84    Pulse 61    Temp 97.4 °F (36.3 °C)    Resp 18    Ht 5' 4\" (1.626 m)    Wt 83 kg (183 lb)    SpO2 96%    Breastfeeding No    BMI 31.41 kg/m2     General:Alert, cooperative, no distress, appears stated age  Head: Normocephalic, without obvious abnormality, atraumatic. Eyes: Conjunctivae/corneas clear. PERRL, EOMs intact  Nose:Nares normal. Septum midline. Mucosa normal. No drainage or sinus tenderness. Throat: Lips, mucosa, and tongue normal. Teeth and gums normal.   Neck: Supple, symmetrical, trachea midline,  no carotid bruit and no JVD. Lungs:Clear to auscultation bilaterally. Chest wall: No tenderness or deformity. Heart: irregular, irregular   Abdomen:Soft, non-tender. Bowel sounds normal. No masses, No organomegaly. Extremities: Extremities normal, atraumatic, no cyanosis or edema. Pulses: 2+ and symmetric all extremities. Skin: Skin color, texture, turgor normal. No rashes or lesions  Lymph nodes: Cervical, supraclavicular, and axillary nodes normal  Neurologic:No focal deficits identified                 ECG:afib     Data Review:     Recent Results (from the past 24 hour(s))   METABOLIC PANEL, BASIC    Collection Time: 08/15/18 10:03 AM   Result Value Ref Range    Sodium 143 136 - 145 mmol/L    Potassium 4.1 3.5 - 5.1 mmol/L    Chloride 104 98 - 107 mmol/L    CO2 29 21 - 32 mmol/L    Anion gap 10 7 - 16 mmol/L    Glucose 133 (H) 65 - 100 mg/dL    BUN 41 (H) 8 - 23 MG/DL    Creatinine 2.04 (H) 0.6 - 1.0 MG/DL    GFR est AA 29 (L) >60 ml/min/1.73m2    GFR est non-AA 24 (L) >60 ml/min/1.73m2    Calcium 9.0 8.3 - 10.4 MG/DL         Assessment / Plan     Principal Problem:    Syncope (5/51/0054)-QY certainly could have tachy-javid syndrome given afib. Explained to her that PM placement is usual treatment strategy. She is not interested in pursuing this. Noted DNR. Would like to discuss this in front of family, POA.  But she does appear to be competent     Active Problems: Paroxysmal atrial fibrillation (HCC) (12/23/2017)--as above, appears to be persistent now. Not on oral anticoagulant with chronic anemia and fall risk       Stage 3 chronic kidney disease (12/23/2017)      Pulmonary fibrosis ([facility] [address] [facility] (12/25/2017)      Anemia (8/15/2018)--as above.                Zackary Almanza, NP

## 2018-08-15 NOTE — PROGRESS NOTES
Progress Note    Patient: Nimco Ramos MRN: [id number]  SSN: [id number]    YOB: 1924  Age: 80 y.o. Sex: female      Admit Date: 8/13/2018    LOS: 0 days     Subjective:      Pt has no complaints this morning. She worked well with PT yesterday. She hasn't noticed any changes in her breathing. Objective:     Vitals:    08/15/18 0200 08/15/18 0400 08/15/18 0759 08/15/18 0800   BP:  146/82  145/82   Pulse:  (!) 59  90   Resp:  18  18   Temp:  98 °F (36.7 °C)  97.8 °F (36.6 °C)   SpO2: 95% 99% 94% 100%   Weight:       Height:              Physical Exam:   General: elderly female sitting up in bed, eating breakfast, NAD   HENT: Normocephalic and atraumatic. Pupils are equal, round, and reactive to light. Extraocular movements intact. Oropharynx pink/slightly dry. Neck: Normal range of motion. Neck supple. Cardiovascular: Regular rate and rhythm; no murmurs, rubs, gallops. Pulmonary/Chest: No increased work of breathing. No respiratory distress. Wheezing in all lung fields posteriorly - sounds worse. Abdominal: Soft, non distended, non tender. Obese. Extremities: No edema. Neurological: Cranial nerves II-XII intact. No focal deficits. Skin: Warm, intact. No rashes/lesions. Lab/Data Review:  No results found for this or any previous visit (from the past 24 hour(s)). Assessment:   Ms. Nimco Ramos is a 80 y.o. female with PMH of pulmonary fibrosis, afib and remote PE admitted for fall with concern for syncope. Plan:   Syncope/fall - Suspected syncope, but unclear if she lost consciousness. Bradycardia and hypoxemia upon EMS arrival may be from vasovagal syncopal episode. Echo unremarkable, UA clean. Infectious etiology not suspected. There may be a component of dehydration that drove fall/syncope.  PE also considered but no other symptoms and WELLS score only 1.5 for h/o PE 18 years ago.     Pulmonary fibrosis with hypoxemia - It is unclear whether pt was wearing O2 when EMS arrived but she normally wears 2L 24/7. Pt sating well on home O2 but has a lot of wheezing that has continued despite prednisone. Continue scheduled Duonebs, continue home Symbicort, prn albuterol and Lasix. Will repeat CXR as lungs sound worse today. .     Afib - Pt bradycardic on admission but tachy yesterday and BB restarted. Rate controlled this am. Pt not on AC. ? Tachybrady syndrome which could have contributed to admitting event.     CKD - AM BMP pending. Lasix restarted yesterday so will monitor closely.     Hypothyroidism - TSH elevated but can be an acute phase reactant. This needs to be repeated as an OP in 4 weeks. Continue home dose of Synthroid.     Anemia - Hgb up from last value recorded. Chronic anemia.        DVT ppx - heparin SQ  DNR    Signed By: Indio Marrero MD     August 15, 2018

## 2018-08-15 NOTE — PROGRESS NOTES
Problem: Mobility Impaired (Adult and Pediatric)  Goal: *Acute Goals and Plan of Care (Insert Text)  Acute PT Goals:  1. Patient will perform bed mobility with MINIMAL ASSISTANCE x1 within 7 days. 2. Patient will transfer bed to chair with MINIMAL ASSISTANCE x1 within 7 days. 3. Patient will demonstrate FAIR DYNAMIC STANDING balance within 7 day(s). 4. Patient will tolerate 25+ minutes of therapeutic activity/exercise and/or neuromuscular re-education while maintaining stable vitals to improve functional strength and activity tolerance within 7 days. PHYSICAL THERAPY: Daily Note, Treatment Day: 1st, PM 8/15/2018  OBSERVATION: Hospital Day: 3  Payor: Dave Mccoy / Plan: Raj Mom / Product Type: [employer] Care Medicare /      NAME/AGE/GENDER: Ron Naylor is a 80 y.o. female   PRIMARY DIAGNOSIS: Syncope Syncope Syncope        ICD-10: Treatment Diagnosis:    · Generalized Muscle Weakness (M62.81)  · Difficulty in walking, Not elsewhere classified (R26.2)  · History of falling (Z91.81)   Precaution/Allergies:  Methotrexate and Sulfa (sulfonamide antibiotics)      ASSESSMENT:     Ms. Avery Miguel is a 80year old female admitted with syncope. Pt presents supine in bed upon physical therapist entry to room and agreeable to session. She was able to sit up at the edge of bed with min-modA x 2, and then scooted forward in sitting with maxA x 2. She then stood up at the RW with min-modA x 2 and weightshifted side to side for 20-30 seconds with CGA before sidestepping 2 feet toward head of bed with RW and CGA-Stephanie x 2. She was also able to ambulate forward/backward x 2 feet using RW with same level of assist, but did not attempt further ambulation secondary to pt stating her L knee felt like it was going to give out. Pt returned to supine position in bed with modA x 2 and was left with all needs met. Patient lives with 24-7 caregiver support in a single story residence.  Per caregiver report, patient is mostly nonambulatory, transfers with assist x1, and required assistance x1 with modified ADLs. Ms. Salazar Gonzalez is currently functioning below her 1 person assist baseline. Recommend continued skilled PT services to address stated deficits. Will follow and progress toward stated goals during acute stay. This section established at most recent assessment   PROBLEM LIST (Impairments causing functional limitations):  1. Decreased Strength  2. Decreased ADL/Functional Activities  3. Decreased Transfer Abilities  4. Decreased Ambulation Ability/Technique  5. Decreased Balance  6. Decreased Activity Tolerance  7. Increased Fatigue  8. Increased Shortness of Breath  9. Decreased Knowledge of Precautions  10. Decreased Cognition   INTERVENTIONS PLANNED: (Benefits and precautions of physical therapy have been discussed with the patient.)  1. Balance Exercise  2. Bed Mobility  3. Family Education  4. Gait Training  5. Home Exercise Program (HEP)  6. Neuromuscular Re-education/Strengthening  7. Range of Motion (ROM)  8. Therapeutic Activites  9. Therapeutic Exercise/Strengthening  10. Transfer Training  11. Group Therapy     TREATMENT PLAN: Frequency/Duration: 3 times a week for duration of hospital stay  Rehabilitation Potential For Stated Goals: Good     RECOMMENDED REHABILITATION/EQUIPMENT: (at time of discharge pending progress): Due to the probability of continued deficits (see above) this patient will likely need continued skilled physical therapy after discharge. Equipment:    None at this time              HISTORY:   History of Present Injury/Illness (Reason for Referral):  Syncope; Weakness; Difficulty in Walking  Past Medical History/Comorbidities:   Ms. Salazar Gonzalez  has a past medical history of Arthritis; Cancer ([facility] [address] Exertional dyspnea; HBP (high blood pressure); Hypothyroid; PE (pulmonary embolism) (2000); and TB (pulmonary tuberculosis) (1930).  She also has no past medical history of Adverse effect of anesthesia; Difficult intubation; Hematuria, microscopic; Kidney stone; Malignant hyperthermia due to anesthesia; Nausea & vomiting; or Pseudocholinesterase deficiency. Ms. Marnie Tavarez  has a past surgical history that includes hx appendectomy (1940); hx tonsil and adenoidectomy (1931); hx hip replacement (Left, 2007); hx  section; hx carpal tunnel release (Bilateral); and hx back surgery (2016). Social History/Living Environment:   Home Environment: Private residence  # Steps to Enter: 1  One/Two Story Residence: One story  Living Alone: Yes  Support Systems: Home care staff  Patient Expects to be Discharged to[de-identified] Private residence  Current DME Used/Available at Home: Com[employer], bedside, Walker, [address] bed  Tub or Shower Type:  (sponge bath )  Prior Level of Function/Work/Activity:  Patient lives with 24-7 caregiver support in a single story residence. Per caregiver report, patient is mostly nonambulatory, transfers with assist x1, and required assistance x1 with modified ADLs. Number of Personal Factors/Comorbidities that affect the Plan of Care: 1-2: MODERATE COMPLEXITY   EXAMINATION:   Most Recent Physical Functioning:   Gross Assessment:                  Posture:     Balance:  Sitting: Impaired  Sitting - Static: Good (unsupported)  Sitting - Dynamic: Fair (occasional)  Standing: Impaired  Standing - Static: Poor  Standing - Dynamic : Poor Bed Mobility:  Supine to Sit: Minimum assistance; Moderate assistance;Assist x2  Sit to Supine: Moderate assistance;Assist x2  Scooting: Maximum assistance (forward to edge of bed)  Interventions: Manual cues; Verbal cues (assist at trunk and LEs)  Wheelchair Mobility:     Transfers:  Sit to Stand: Minimum assistance; Moderate assistance;Assist x2  Stand to Sit: Minimum assistance;Assist x2  Interventions: Safety awareness training;Verbal cues (attempted to cue pt to push up from/reach back for bed)  Gait:     Base of Support: Widened  Speed/Bessy: Shuffled; Slow  Step Length: Left shortened;Right shortened  Gait Abnormalities: Decreased step clearance; Path deviations;Trunk sway increased; Shuffling gait  Distance (ft): 2 Feet (ft) (2 feet sidestepping toward R; 2 feet forward/backward)  Assistive Device: Walker, rolling  Ambulation - Level of Assistance: Contact guard assistance;Minimal assistance;Assist x2  Interventions: Safety awareness training;Verbal cues; Visual/Demos (cues for upright posture and weightshiftin)      Body Structures Involved:  1. Muscles Body Functions Affected:  1. Neuromusculoskeletal  2. Movement Related Activities and Participation Affected:  1. Mobility  2. Self Care   Number of elements that affect the Plan of Care: 3: MODERATE COMPLEXITY   CLINICAL PRESENTATION:   Presentation: Evolving clinical presentation with changing clinical characteristics: MODERATE COMPLEXITY   CLINICAL DECISION MAKIN [facility] Inpatient Short Form  How much difficulty does the patient currently have. .. Unable A Lot A Little None   1. Turning over in bed (including adjusting bedclothes, sheets and blankets)? [] 1   [] 2   [x] 3   [] 4   2. Sitting down on and standing up from a chair with arms ( e.g., wheelchair, bedside commode, etc.)   [] 1   [x] 2   [] 3   [] 4   3. Moving from lying on back to sitting on the side of the bed? [] 1   [x] 2   [] 3   [] 4   How much help from another person does the patient currently need. .. Total A Lot A Little None   4. Moving to and from a bed to a chair (including a wheelchair)? [] 1   [x] 2   [] 3   [] 4   5. Need to walk in hospital room? [] 1   [x] 2   [] 3   [] 4   6. Climbing 3-5 steps with a railing? [x] 1   [] 2   [] 3   [] 4   © , Trustees of [address] [place] [address] [address] [address], under license to [employer].  All rights reserved      Score:  Initial: 12 Most Recent: 12 (Date: 18 )    Interpretation of Tool:  Represents activities that are increasingly more difficult (i.e. Bed mobility, Transfers, Gait). Score 24 23 22-20 19-15 14-10 9-7 6     Modifier CH CI CJ CK CL CM CN      ? Mobility - Walking and Moving Around:     - CURRENT STATUS: CL - 60%-79% impaired, limited or restricted    - GOAL STATUS: CK - 40%-59% impaired, limited or restricted    - D/C STATUS:  CL - 60%-79% impaired, limited or restricted  Payor: [address] [place] [address] / Plan: [address] / Product Type: [employer] Medicare /      Medical Necessity:     · Patient demonstrates good rehab potential due to higher previous functional level. Reason for Services/Other Comments:  · Patient continues to require skilled intervention due to decreased functional activity tolerance and mobility from baseline. .   Use of outcome tool(s) and clinical judgement create a POC that gives a: Questionable prediction of patient's progress: MODERATE COMPLEXITY            TREATMENT:   (In addition to Assessment/Re-Assessment sessions the following treatments were rendered)   Pre-treatment Symptoms/Complaints:  Pt finishing up breathing treatment upon physical therapist entry to room  Pain: Initial:   Pain Intensity 1: 0  Post Session:  0/10     Initial Evaluation  Therapeutic Activity: (    13 Minutes): Therapeutic activities including bed mobility, transfer training, static sitting, safety awareness training, ambulation on level ground, and patient education to improve mobility and strength. Required moderate Safety awareness training;Verbal cues; Visual/Demos (cues for upright posture and weightshiftin) to promote static and dynamic balance in standing. Braces/Orthotics/Lines/Etc:   · O2 Device: Nasal cannula  Treatment/Session Assessment:    · Response to Treatment:  Pt was able to perform all functional mobility with less assist this session.   · Interdisciplinary Collaboration:   o Physical Therapist  o Registered Nurse  · After treatment position/precautions:   o Supine in bed  o Bed alarm/tab alert on  o Bed/Chair-wheels locked  o Bed in low position  o Call light within reach  o RN notified  o caregiver at bedside   · Compliance with Program/Exercises: Will assess as treatment progresses. · Recommendations/Intent for next treatment session: \"Next visit will focus on advancements to more challenging activities and reduction in assistance provided\".   Total Treatment Duration:  PT Patient Time In/Time Out  Time In: 1454  Time Out: [address] Maria G, SARAH

## 2018-08-15 NOTE — PROGRESS NOTES
Updated pt's POA, son, Beatrice Rojas, Re: pt's consult with cardiology, and pt refusing pacemaker placement. Genesis Dobbins also made aware that cardiology will be back by tomorrow to revisit, answer any questions and discuss care.

## 2018-08-16 NOTE — PROGRESS NOTES
Discharge instructions and prescriptions given and explained to pt's caregiver. Pt and caregiver verbalized understanding. Medication side effects sheet reviewed with pt. Pt to be discharged home.

## 2018-08-16 NOTE — DISCHARGE INSTRUCTIONS
Vasovagal Syncope: Care Instructions  Your Care Instructions    Vasovagal syncope (say \"mqt-puc-PZOBijan CARRINGTON-kuh-pee\")is sudden dizziness or fainting that can be set off by things such as pain, stress, fear, or trauma. You may sweat or feel lightheaded, sick to your stomach, or tingly. The problem causes the heart rate to slow and the blood vessels to widen, or dilate, for a short time. When this happens, blood pools in the lower body, and less blood goes to the brain. You can usually get relief by lying down with your legs raised (elevated). This helps more blood to flow to your brain and may help relieve symptoms like feeling dizzy. Some doctors may recommend a technique that involves tensing your fists and arms. This type of fainting is often easy to predict. For example, it happens to some people when they see blood or have to get a shot. They may feel symptoms before they faint. An episode of vasovagal syncope usually responds well to self-care. Other treatment often isn't needed. But if the fainting keeps happening, your doctor may suggest further treatments. Follow-up care is a key part of your treatment and safety. Be sure to make and go to all appointments, and call your doctor if you are having problems. It's also a good idea to know your test results and keep a list of the medicines you take. How can you care for yourself at home? · Drink plenty of fluids to prevent dehydration. If you have kidney, heart, or liver disease and have to limit fluids, talk with your doctor before you increase your fluid intake. · Try to avoid things that you think may set off vasovagal syncope. · Talk to your doctor about any medicines you take. Some medicines may increase the chance of this condition occurring. · If you feel symptoms, lie down with your legs raised. Talk to your doctor about what to do if your symptoms come back. When should you call for help?   Call 911 anytime you think you may need emergency care. For example, call if:    · You have symptoms of a heart problem. These may include:  ¨ Chest pain or pressure. ¨ Severe trouble breathing. ¨ A fast or irregular heartbeat.    Watch closely for changes in your health, and be sure to contact your doctor if:    · You have more episodes of fainting at home.     · You do not get better as expected. Where can you learn more? Go to http://aleksandr-sumaay.info/. Enter L754 in the search box to learn more about \"Vasovagal Syncope: Care Instructions. \"  Current as of: November 20, 2017  Content Version: 11.7  © 3350-1847 [employer]. Care instructions adapted under license by [employer] (which disclaims liability or warranty for this information). If you have questions about a medical condition or this instruction, always ask your healthcare professional. [address] any warranty or liability for your use of this information. DISCHARGE SUMMARY from Nurse    PATIENT INSTRUCTIONS:    After general anesthesia or intravenous sedation, for 24 hours or while taking prescription Narcotics:  · Limit your activities  · Do not drive and operate hazardous machinery  · Do not make important personal or business decisions  · Do  not drink alcoholic beverages  · If you have not urinated within 8 hours after discharge, please contact your surgeon on call. Report the following to your surgeon:  · Excessive pain, swelling, redness or odor of or around the surgical area  · Temperature over 100.5  · Nausea and vomiting lasting longer than 4 hours or if unable to take medications  · Any signs of decreased circulation or nerve impairment to extremity: change in color, persistent  numbness, tingling, coldness or increase pain  · Any questions    What to do at Home:  Recommended activity: Activity as tolerated, resume home diet as tolerated.      *  Please give a list of your current medications to your Primary Care Provider. *  Please update this list whenever your medications are discontinued, doses are      changed, or new medications (including over-the-counter products) are added. *  Please carry medication information at all times in case of emergency situations. These are general instructions for a healthy lifestyle:    No smoking/ No tobacco products/ Avoid exposure to second hand smoke  Surgeon General's Warning:  Quitting smoking now greatly reduces serious risk to your health. Obesity, smoking, and sedentary lifestyle greatly increases your risk for illness    A healthy diet, regular physical exercise & weight monitoring are important for maintaining a healthy lifestyle    You may be retaining fluid if you have a history of heart failure or if you experience any of the following symptoms:  Weight gain of 3 pounds or more overnight or 5 pounds in a week, increased swelling in our hands or feet or shortness of breath while lying flat in bed. Please call your doctor as soon as you notice any of these symptoms; do not wait until your next office visit. Recognize signs and symptoms of STROKE:    F-face looks uneven    A-arms unable to move or move unevenly    S-speech slurred or non-existent    T-time-call 911 as soon as signs and symptoms begin-DO NOT go       Back to bed or wait to see if you get better-TIME IS BRAIN. Warning Signs of HEART ATTACK     Call 911 if you have these symptoms:   Chest discomfort. Most heart attacks involve discomfort in the center of the chest that lasts more than a few minutes, or that goes away and comes back. It can feel like uncomfortable pressure, squeezing, fullness, or pain.  Discomfort in other areas of the upper body. Symptoms can include pain or discomfort in one or both arms, the back, neck, jaw, or stomach.  Shortness of breath with or without chest discomfort.    Other signs may include breaking out in a cold sweat, nausea, or lightheadedness. Don't wait more than five minutes to call 911 - MINUTES MATTER! Fast action can save your life. Calling 911 is almost always the fastest way to get lifesaving treatment. Emergency Medical Services staff can begin treatment when they arrive -- up to an hour sooner than if someone gets to the hospital by car. The discharge information has been reviewed with the patient. The patient verbalized understanding. Discharge medications reviewed with the patient and appropriate educational materials and side effects teaching were provided.   ___________________________________________________________________________________________________________________________________

## 2018-08-16 NOTE — DISCHARGE SUMMARY
Discharge Summary     Patient: Berhane Nails MRN: [id number]  SSN: [id number]    YOB: 1924  Age: 80 y.o. Sex: female       Patient: Berhane Naisl  : 10/30/1924 MRN: [id number]      PCP: Olivia Rivera MD   Age: 80 y.o. Code Status: DNR  Sex: female        Discharge Diagnoses   Patient Active Problem List   Diagnosis Code    Rheumatoid arthritis ([facility] [address] M06.9    IPMN (intraductal papillary mucinous neoplasm) D49.0    Nocturnal enuresis N39.44    Mild persistent asthma without complication N44.34    Paroxysmal atrial fibrillation (HCC) I48.0    Chronic diastolic congestive heart failure (HCC) I50.32    Stage 3 chronic kidney disease N18.3    Pulmonary fibrosis ([facility] [address] J84.10    Acute on chronic respiratory failure with hypoxia ([facility] [address] J96.21    Oropharyngeal dysphagia R13.12    Spinal stenosis of thoracolumbar region M48.05    Debility R53.81    Palliative care patient Z51.5    Syncope R55    Anemia D64.9   Bradycardia       Hospital Course   Presenting Problem:   Chief Complaint   Patient presents with    Syncope    Altered mental status        History of Present Illness: Berhane Nails is a 80 y.o. female with PMH of afib and pulmonary fibrosis on 2L of O2 continuously who was brought to the ED by EMS after a syncopal event. Pt lives alone with 24h care from [address] [place] [address]. The caregiver who was with her when this occurred at 2am is not present, but the day shift caregiver is at the bedside. Pt went to the bathroom but due to chronic weakness in her L knee she had trouble standing from the toilet. She slumped down and fell back on the toilet. It is unclear if she lost consciousness. When EMS arrived, pt was found to be bradycardic in the 30s with O2 sat of 68%. EMS bagged her and then brought her to the ED. HR now in the 70s and she is sating well on 3L O2.  Pt denies CP, palpitations, HA, dizziness, dysuria, increased urinary frequency, diarrhea, abdominal pain, fevers or arm/jaw pain. She does have dyspnea at baseline from pulmonary fibrosis. She also has a chronic cough that is worse in the ams. She complains only of being weak at this time. In the ED lactate was found to be elevated to 4.1, K 5.5 and TSH 7.9. She was given vanc and Zosyn and a liter of NS in the ED. Hospital Course:   1. Syncope/fall - It is unclear if patient lost consciousness. EMS noted bradycardia and hypoxemia upon arrival. Echo unremarkable, no infectious etiology of symptoms. Pt was noted to have rapid afib with HR in the 150s at times with report of bradycardia on EMS arrival but no rhythm strips. With concern for tachybrady syndrome, cardiology was consulted. Pt does not want PPM and indication is not clear at this time anyway. She will go with an event monitor for 2 weeks and f/u with cardiology. 2. Pulmonary fibrosis with hypoxemia - Pt has chronic O2 requirement of 2L. She had extensive wheezing during her stay with chronic wheezing at baseline. With worsening wheezing, Lasix was resumed and prednisone started with good response. She will continue with prednisone for 3 more days and should f/u with her pulmonologist in the next week or two. 3. Afib - As mentioned above, pt was bradycardic with EMS so BB initially held. With rising rates, Coreg was restarted, however HR intermittently was in the 150s. Cards aware as above. No AC with fall risk. Continued on previous dose of Coreg with normal HR at this time. 4. CKD - Cr stable. Home Lasix continued. 5. Hypothyroidism - TSH elevated but can be an acute phase reactant. This needs to be repeated as an OP in 4 weeks. Continue home dose of Synthroid. 6. Anemia - Hgb up from last value recorded. Chronic anemia.       Consults: Cardiology    Procedures: none     Discharge Exam & Pertinent Results   VITALS:  Visit Vitals    /74 (BP 1 Location: Right arm, BP Patient Position: At rest)    Pulse (!) 115    Temp 97.5 °F (36.4 °C)    Resp 16    Ht 5' 4\" (1.626 m)    Wt 83 kg (183 lb)    SpO2 95%    Breastfeeding No    BMI 31.41 kg/m2      Physical Exam:  General: elderly female sitting up in bed, eating breakfast, NAD   HENT: Normocephalic and atraumatic. Pupils are equal, round, and reactive to light. Extraocular movements intact. Oropharynx pink/moist.  Neck: Normal range of motion. Neck supple. Cardiovascular: Regular rate and rhythm; no murmurs, rubs, gallops. Pulmonary/Chest: No increased work of breathing. No respiratory distress. Wheezing in all lung fields but improved. Abdominal: Soft, non distended, non tender. Obese. Extremities: No edema. Neurological: Cranial nerves II-XII intact. No focal deficits.    Skin: Warm, intact. No rashes/lesions. Recent Pertinent Labs:  Lab Results   Component Value Date/Time    WBC 11.2 (H) 08/14/2018 04:36 AM    HGB 9.1 (L) 08/14/2018 04:36 AM    HCT 30.1 (L) 08/14/2018 04:36 AM     08/14/2018 04:36 AM     08/15/2018 10:03 AM    K 4.1 08/15/2018 10:03 AM     08/15/2018 10:03 AM    CO2 29 08/15/2018 10:03 AM    BUN 41 (H) 08/15/2018 10:03 AM     (H) 08/15/2018 10:03 AM       Additional Test Results:   Micro: All Micro Results     Procedure Component Value Units Date/Time    CULTURE, BLOOD [[id number]] Collected:  08/13/18 0506    Order Status:  Completed Specimen:  Blood from Blood Updated:  08/16/18 1028     Special Requests: --        LEFT  Antecubital       Culture result: NO GROWTH 3 DAYS       CULTURE, BLOOD [[id number]] Collected:  08/13/18 0501    Order Status:  Completed Specimen:  Blood from Blood Updated:  08/16/18 1028     Special Requests: --        LEFT  HAND       Culture result: NO GROWTH 3 DAYS             Imaging:     XR CHEST SNGL V   Final Result   IMPRESSION: Chronic diffuse reticular interstitial densities throughout the   lungs, unchanged.       CT HEAD WO CONT   Final Result   IMPRESSION:      No acute intracranial abnormalities. Date of Dictation: 8/13/2018 5:49 AM         XR CHEST PORT   Final Result   IMPRESSION: Interstitial pulmonary edema worse in the interval.                 Discharge Medications   Current Discharge Medication List      START taking these medications    Details   predniSONE (DELTASONE) 20 mg tablet Take 2 Tabs by mouth daily (with breakfast) for 3 days. Qty: 3 Tab, Refills: 0         CONTINUE these medications which have NOT CHANGED    Details   levothyroxine (SYNTHROID) 75 mcg tablet TAKE 1 TABLET BY MOUTH DAILY BEFORE BREAKFAST  Qty: 90 Tab, Refills: 0      carvedilol (COREG) 3.125 mg tablet TAKE 1 TABLET BY MOUTH TWICE DAILY WITH MEALS  Qty: 180 Tab, Refills: 2    Comments: **Patient requests 90 days supply**  Associated Diagnoses: Essential hypertension; Atrial fibrillation, unspecified type ([address] Tachycardia      budesonide-formoterol (SYMBICORT) 160-4.5 mcg/actuation HFAA INHALE TWO PUFFS BY MOUTH TWICE A DAY  Qty: 30.6 Inhaler, Refills: 11      albuterol (PROAIR HFA) 90 mcg/actuation inhaler Take 1 Puff by inhalation every six (6) hours as needed for Wheezing. Qty: 1 Inhaler, Refills: 11    Associated Diagnoses: Hypotension, unspecified hypotension type; Acute on chronic respiratory failure with hypoxia ([address] Pulmonary fibrosis ([address] Debility; Chronic diastolic congestive heart failure ([address] Stage 3 chronic kidney disease; Mild persistent asthma without complication; Spinal stenosis of thoracolumbar region      furosemide (LASIX) 40 mg tablet TAKE 1 TABLET BY MOUTH EVERY MORNING, AND A 1/2 TABLET IN THE AFTERNOON. Qty: 135 Tab, Refills: 1      sodium chloride (SALINE MIST NA) 1 Spray by Nasal route as needed (dryness ). glucosamine/chondr fishman A sod (OSTEO BI-FLEX PO) Take 2 Caps by mouth daily.       lisinopril (PRINIVIL, ZESTRIL) 20 mg tablet TAKE 1 TABLET BY MOUTH DAILY  Qty: 90 Tab, Refills: 11    Comments: **Patient requests 90 days supply** COMPR.STOCKING,THIGH,REG,LARGE (COMP. STOCKING,THIGH,REG,LARGE) misc 2 Each by Does Not Apply route daily. Qty: 2 Each, Refills: 0    Associated Diagnoses: Leg swelling      magnesium oxide (MAG-OX) 400 mg tablet Take 1 Tab by mouth two (2) times a day. Qty: 180 Tab, Refills: 3      aspirin delayed-release 81 mg tablet Take 1 Tab by mouth daily. Qty: 90 Tab, Refills: 3      OXYGEN-AIR DELIVERY SYSTEMS Take 2 L by inhalation continuous. via NC      albuterol-ipratropium (DUO-NEB) 2.5 mg-0.5 mg/3 ml nebu 3 mL by Nebulization route every six (6) hours as needed. Qty: 30 Nebule, Refills: 2         STOP taking these medications       OTHER,NON-FORMULARY, Comments:   Reason for Stopping:                Discharge Follow up and Special Instructions   Disposition: home where she has /7 [employer]  Follow up with: Future Appointments  Date Time Provider Cassie Stewart   11/8/2018 11:00 AM Tali Jordan MD [facility]     Condition: fair  Diet: DIET CARDIAC Regular  Activity: As tolerated  Restrictions: none    Time spent on care of coordination of discharge was 40 minutes for chart review, medication reconciliation, patient education, and coordination of services with case management and nursing.      Latasha Doyle MD  [facility] Hospitalist  08/16/18, 12:45 PM

## 2018-08-16 NOTE — PROGRESS NOTES
Spoke with patient and care giver regarding discharge planning. Lives alone in own one story home with one step entry. Patient has 24/7 care givers with [employer]. They assist her with all ADL's. Patient ambulates with rolling walker. PCP- Dr. Everett Kern and last seen approx 3 weeks ago  DME- rollinig walker, W/C, [facility]  On continuous O2 at 2L/min and is serviced by [facility].   Has had previous [facility] services. Denies having any problems affording Rx  Patient plans to return home with care givers upon discharge. No anticipated needs noted at discharge. CM will continue to follow. Care Management Interventions  PCP Verified by CM: Yes  Mode of Transport at Discharge:  Other (see comment) (family)  Transition of Care Consult (CM Consult): Discharge Planning  Physical Therapy Consult: Yes  Occupational Therapy Consult: Yes  Current Support Network: Has Personal Caregivers, Lives Alone  Confirm Follow Up Transport: Other (see comment) (care givers)  Plan discussed with Pt/Family/Caregiver: Yes  Freedom of Choice Offered: Yes  Discharge Location  Discharge Placement: Home with family assistance ([employer] 24/7)

## 2018-08-16 NOTE — PROGRESS NOTES
[facility] CARDIOLOGY PROGRESS NOTE           8/16/2018 11:47 AM    Admit Date: 8/13/2018      Subjective:     No o/e; no significant bradycardia on tele. Restarted on coreg with some RVR    ROS:  Cardiovascular:  As noted above    Objective:      Vitals:    08/16/18 0251 08/16/18 0256 08/16/18 0710 08/16/18 0800   BP:  127/90  126/70   Pulse:  88  95   Resp:  16  16   Temp:  98.6 °F (37 °C)  98.3 °F (36.8 °C)   SpO2: 99% 97% 98% 98%   Weight:       Height:           Physical Exam:  General-No Acute Distress  Neck- supple, no JVD  CV- irregular rate and rhythm no MRG  Lung- clear bilaterally  Abd- soft, nontender, nondistended  Ext- no edema bilaterally. Skin- warm and dry    Data Review:   Recent Labs      08/15/18   1003  08/14/18   0436   NA  143  145   K  4.1  4.5   BUN  41*  36*   CREA  2.04*  1.97*   GLU  133*  139*   WBC   --   11.2*   HGB   --   9.1*   HCT   --   30.1*   PLT   --   320       Assessment/Plan:     Principal Problem:    Syncope (8/13/2018)    Active Problems:    Paroxysmal atrial fibrillation ([facility] [address] (12/23/2017)      Stage 3 chronic kidney disease (12/23/2017)      Pulmonary fibrosis ([facility] [address] (12/25/2017)      Anemia (8/15/2018)    Appears long standing persistent afib. Reported bradycardia per EMS with no documented strips; also some ARF on CRI on presentation; K at 5.5. Hold off PPM at this time in the setting of multiple compounding factors; also pt apprehensive. Extensive discussion with pt and son. Continue on telemetry. Restarted on coreg with some RVR. If issues with bradycardia limiting therapy for afib, will need PPM. Less likely conversion pauses with longstanding persistent afib. Plan 2 week tele on d/c. Per records, prior opted for no AC and discussed again.        Lisa Toussaint MD  8/16/2018 11:47 AM

## 2018-12-17 NOTE — ED NOTES
Pt arrives via [facility] from home, pt called out for witnessed arrest, 6 min down time, unresponsive, fire starts CPR, 6 rounds of EPI given, 1 mg atropine, 7 ET tube, 24 at lip. Asystole, PEA to ROSC x4, A fib and sinus tachy on monitor.

## 2018-12-17 NOTE — PROGRESS NOTES
Initial visit to assess pt's spiritual needs. Pt was being treated for cardiac arrest, no family members present.       rodney Bo MDiv,ThM,PhD

## 2018-12-17 NOTE — ED NOTES
Midpoint pupils. Compressions held, MD Tyree at bedside performing [address]. No pulse, compressions continued. PEA @40.  Activity noted on US per verbal MD Tyree.

## 2018-12-17 NOTE — ED TRIAGE NOTES
Pt arrives via [facility], pt actively coding upon arrival, pt intubated. Pt had ROSC at start of transport but have not had since then. Original downtime at [phone].

## 2018-12-18 NOTE — PROGRESS NOTES
On Call  responded to call from evening  for assistance in the ED. Pt was declining and family were present. Pt  before On Call  arrived.  met with evening  and was updated on family. Pt's son and two former caregivers were present in the Cath Lab waiting area. Rn Supervisor was present and speaking with the son.  met with the family/caregivers.  initial son and caregivers to talk about pt. They shared stories and laughed some. Son stated that he was going to contact his son and they would get together and eat.  departed as they were leaving and informed the Rn Supervisor that family was leaving. Family was grieving appropriately.  provided spiritual care through presence, pastoral conversation, sharing of scripture and assurance of prayer.

## 2018-12-18 NOTE — ED PROVIDER NOTES
Per EMS, patient collapsed at home in the presence of a caregiver. The caregiver could not feel a pulse and a meal.  He called 911. This started and CPR. EMS states they arrived 6 minutes later and found her in asystole. They commenced CPR and were able to eventually get PEA. She was given medications and they were able to get a spontaneous return of circulation. However, in route, she lost her pulse and chest compressions commenced. She was intubated in route. No family was present when she first presented. No other history is available. She was given a total of 6 mg of epinephrine in route. Elements of this note were created using speech recognition software. As such, errors of speech recognition may be present. Pediatric Social History:         No past medical history on file. No past surgical history on file. No family history on file. Social History     Socioeconomic History    Marital status: Not on file     Spouse name: Not on file    Number of children: Not on file    Years of education: Not on file    Highest education level: Not on file   Social Needs    Financial resource strain: Not on file    Food insecurity - worry: Not on file    Food insecurity - inability: Not on file    Transportation needs - medical: Not on file   [employer] needs - non-medical: Not on file   Occupational History    Not on file   Tobacco Use    Smoking status: Not on file   Substance and Sexual Activity    Alcohol use: Not on file    Drug use: Not on file    Sexual activity: Not on file   Other Topics Concern    Not on file   Social History Narrative    Not on file         ALLERGIES: Patient has no allergy information on record.     Review of Systems   Unable to perform ROS: Intubated       Vitals:    12/17/18 1842 12/17/18 1844 12/17/18 1855 12/17/18 1913   BP: 168/89  166/105    Pulse: 102 92 114    Resp: 0  12 0   SpO2: (!) 86% (!) 81% (!) 71%    Weight:       Height: Physical Exam   Constitutional: She appears well-developed and well-nourished. HENT:   Head: Normocephalic and atraumatic. Eyes: Right eye exhibits no discharge. Left eye exhibits no discharge. Pulmonary/Chest:   Patient is intubated with occasional agonal respirations exhibited. Bilateral breath sounds, slightly diminished on the left   Abdominal: Soft. She exhibits no distension. Musculoskeletal: She exhibits edema. She exhibits no tenderness. 2+ pitting edema bilateral lower extremities   Skin: Skin is warm and dry. Nursing note and vitals reviewed. MDM  Number of Diagnoses or Management Options  Diagnosis management comments: 7:49 PM patient arrived in [place]. Bedside ultrasound showed very minimal organized cardiac activity. CPR was commenced with administration of epinephrine and calcium and bicarbonate. She had return of spontaneous circulation on several occasions for periods of time lasting approximately 5-7 minutes before her pulse would get thready and she would bradycardia down. She responded well to epinephrine. The son arrived [de-identified] in the code and stated she was a DNR. He consulted with another family member who confirmed this. He requested that the code be stopped. This was after a conversation discussing the likely futility of heroic efforts. He is agreeable to this and understands her poor prognosis. Total critical care time spent on initial violation, repeat evaluations, managing her estimate. Vital signs, consulting with family members, obtaining additional history from EMS, documenting in the organizing care was 45 minutes. This excludes any procedure time.        Amount and/or Complexity of Data Reviewed  Clinical lab tests: ordered  Obtain history from someone other than the patient: yes    Risk of Complications, Morbidity, and/or Mortality  Presenting problems: high  Diagnostic procedures: high  Management options: high    Critical Care  Total time providing critical care: 30-74 minutes         Procedures MED-SURG

## 2018-12-20 NOTE — ED PROVIDER NOTES
HPI     Past Medical History:   Diagnosis Date    Arthritis     Cancer ([facility] [address]     pre-cancerous cyst on pancreas    Exertional dyspnea     HBP (high blood pressure)     Hypothyroid     PE (pulmonary embolism)     In [facility] 10 days - blood thinner    TB (pulmonary tuberculosis) 1930       Past Surgical History:   Procedure Laterality Date    HX APPENDECTOMY  1940    HX BACK SURGERY  2016    cyst removal; burst     HX CARPAL TUNNEL RELEASE Bilateral     HX  SECTION      HX HIP REPLACEMENT Left 2007    HX TONSIL AND ADENOIDECTOMY  193         Family History:   Problem Relation Age of Onset    Tuberculosis Father     Cancer Mother     No Known Problems Sister     No Known Problems Brother        Social History     Socioeconomic History    Marital status: SINGLE     Spouse name: Not on file    Number of children: Not on file    Years of education: Not on file    Highest education level: Not on file   Social Needs    Financial resource strain: Not on file    Food insecurity - worry: Not on file    Food insecurity - inability: Not on file   [employer] needs - medical: Not on file   [employer] needs - non-medical: Not on file   Occupational History    Not on file   Tobacco Use    Smoking status: Never Smoker    Smokeless tobacco: Never Used    Tobacco comment: pt states that she smoked socailly for 3 years   Substance and Sexual Activity    Alcohol use: No    Drug use: No    Sexual activity: Not on file   Other Topics Concern    Not on file   Social History Narrative    Not on file         ALLERGIES: Methotrexate and Sulfa (sulfonamide antibiotics)    Review of Systems    There were no vitals filed for this visit.          Physical Exam     MDM       Procedures

## 2019-01-02 LAB
ALBUMIN SERPL-MCNC: 2.3 G/DL (ref 3.2–4.6)
ALBUMIN/GLOB SERPL: 0.6 {RATIO} (ref 1.2–3.5)
ALP SERPL-CCNC: 103 U/L (ref 50–136)
ALT SERPL-CCNC: 48 U/L (ref 12–65)
ANION GAP SERPL CALC-SCNC: 8 MMOL/L (ref 7–16)
AST SERPL-CCNC: 73 U/L (ref 15–37)
BASOPHILS # BLD: 0 K/UL (ref 0–0.2)
BASOPHILS NFR BLD: 0 % (ref 0–2)
BILIRUB SERPL-MCNC: 0.4 MG/DL (ref 0.2–1.1)
BUN SERPL-MCNC: 65 MG/DL (ref 8–23)
CALCIUM SERPL-MCNC: 10.1 MG/DL (ref 8.3–10.4)
CHLORIDE SERPL-SCNC: 107 MMOL/L (ref 98–107)
CO2 SERPL-SCNC: 31 MMOL/L (ref 21–32)
CREAT SERPL-MCNC: 2.37 MG/DL (ref 0.6–1)
DIFFERENTIAL METHOD BLD: ABNORMAL
EOSINOPHIL # BLD: 0.2 K/UL (ref 0–0.8)
EOSINOPHIL NFR BLD: 1 % (ref 0.5–7.8)
ERYTHROCYTE [DISTWIDTH] IN BLOOD BY AUTOMATED COUNT: 17.5 % (ref 11.9–14.6)
GLOBULIN SER CALC-MCNC: 3.7 G/DL (ref 2.3–3.5)
GLUCOSE SERPL-MCNC: 201 MG/DL (ref 65–100)
HCT VFR BLD AUTO: 35.8 % (ref 35.8–46.3)
HGB BLD-MCNC: 10.1 G/DL (ref 11.7–15.4)
IMM GRANULOCYTES # BLD: 1.1 K/UL (ref 0–0.5)
IMM GRANULOCYTES NFR BLD AUTO: 6 % (ref 0–5)
LYMPHOCYTES # BLD: 2 K/UL (ref 0.5–4.6)
LYMPHOCYTES NFR BLD: 11 % (ref 13–44)
MCH RBC QN AUTO: 27.4 PG (ref 26.1–32.9)
MCHC RBC AUTO-ENTMCNC: 28.2 G/DL (ref 31.4–35)
MCV RBC AUTO: 97 FL (ref 79.6–97.8)
MONOCYTES # BLD: 0.5 K/UL (ref 0.1–1.3)
MONOCYTES NFR BLD: 3 % (ref 4–12)
NEUTS SEG # BLD: 14.1 K/UL (ref 1.7–8.2)
NEUTS SEG NFR BLD: 79 % (ref 43–78)
NRBC # BLD: 0.04 K/UL (ref 0–0.2)
PLATELET # BLD AUTO: 129 K/UL (ref 150–450)
PMV BLD AUTO: 14 FL (ref 9.4–12.3)
POTASSIUM SERPL-SCNC: 5.3 MMOL/L (ref 3.5–5.1)
PROT SERPL-MCNC: 6 G/DL (ref 6.3–8.2)
RBC # BLD AUTO: 3.69 M/UL (ref 4.05–5.2)
SODIUM SERPL-SCNC: 146 MMOL/L (ref 136–145)
WBC # BLD AUTO: 17.9 K/UL (ref 4.3–11.1)

## 2024-02-20 NOTE — ED NOTES
Lab called about TSH add on
Pt O2 sat at 86 while pt was asleep, pt normally wears O2 at 2 L at home. Pt placed on 4 L O2 NC sat at 91%. Dr. Leon Stewart notified. Caregiver at bedside.
Pt back from CT
Pt taken to ct
Report to JATIN Hebert. Transfer of care at this time.
TRANSFER - OUT REPORT:    Verbal report given to Santos Esposito on Clarissa Ignacio  being transferred to  for routine progression of care       Report consisted of patients Situation, Background, Assessment and   Recommendations(SBAR). Information from the following report(s) SBAR was reviewed with the receiving nurse. Lines:   Peripheral IV 08/13/18 Left Hand (Active)   Site Assessment Clean, dry, & intact 8/13/2018  5:23 AM   Phlebitis Assessment 0 8/13/2018  5:23 AM   Infiltration Assessment 0 8/13/2018  5:23 AM   Dressing Status Clean, dry, & intact 8/13/2018  5:23 AM       Peripheral IV 08/13/18 Left Antecubital (Active)   Site Assessment Clean, dry, & intact 8/13/2018  5:23 AM   Phlebitis Assessment 0 8/13/2018  5:23 AM   Infiltration Assessment 0 8/13/2018  5:23 AM   Dressing Status Clean, dry, & intact 8/13/2018  5:23 AM       Peripheral IV 08/13/18 Right Forearm (Active)   Site Assessment Clean, dry, & intact 8/13/2018  5:24 AM   Phlebitis Assessment 0 8/13/2018  5:24 AM   Infiltration Assessment 0 8/13/2018  5:24 AM   Dressing Status Clean, dry, & intact 8/13/2018  5:24 AM        Opportunity for questions and clarification was provided.       Patient transported with:   [employer]
Patient requests all Lab, Cardiology, and Radiology Results on their Discharge Instructions

## 2024-12-16 NOTE — PROGRESS NOTES
Problem: Pressure Injury - Risk of  Goal: *Prevention of pressure injury  Document Alonso Scale and appropriate interventions in the flowsheet.    Outcome: Progressing Towards Goal  Pressure Injury Interventions:  Sensory Interventions: Assess need for specialty bed, Float heels, Keep linens dry and wrinkle-free, Minimize linen layers    Moisture Interventions: Apply protective barrier, creams and emollients, Assess need for specialty bed, Check for incontinence Q2 hours and as needed, Limit adult briefs, Maintain skin hydration (lotion/cream), Minimize layers, Moisture barrier    Activity Interventions: Increase time out of bed, Pressure redistribution bed/mattress(bed type), PT/OT evaluation    Mobility Interventions: HOB 30 degrees or less, Pressure redistribution bed/mattress (bed type), PT/OT evaluation    Nutrition Interventions: Document food/fluid/supplement intake, Offer support with meals,snacks and hydration    Friction and Shear Interventions: Apply protective barrier, creams and emollients, Feet elevated on foot rest, Foam dressings/transparent film/skin sealants, HOB 30 degrees or less, Minimize layers 4 = No assist / stand by assistance